# Patient Record
Sex: FEMALE | Race: WHITE | Employment: OTHER | URBAN - METROPOLITAN AREA
[De-identification: names, ages, dates, MRNs, and addresses within clinical notes are randomized per-mention and may not be internally consistent; named-entity substitution may affect disease eponyms.]

---

## 2017-01-02 ENCOUNTER — TELEPHONE (OUTPATIENT)
Dept: NEPHROLOGY | Facility: CLINIC | Age: 67
End: 2017-01-02

## 2017-01-02 DIAGNOSIS — R92.2 DENSE BREASTS: Primary | ICD-10-CM

## 2017-01-02 NOTE — TELEPHONE ENCOUNTER
The mammogram appeared to be normal but the radiologist is suggesting to do an automated whole breast sonography as she has very dense breasts. Okay to order if she wants to do.

## 2017-01-04 NOTE — TELEPHONE ENCOUNTER
Patient contacted. Results message read. Patient states she will be leaving for out of town for 3 months but would like to do the test when she gets back. Dr. Cintron Loose aware. Test has been ordered.

## 2017-01-09 ENCOUNTER — PRIOR ORIGINAL RECORDS (OUTPATIENT)
Dept: OTHER | Age: 67
End: 2017-01-09

## 2017-03-20 RX ORDER — SIMVASTATIN 40 MG
TABLET ORAL
Qty: 135 TABLET | Refills: 0 | Status: SHIPPED | OUTPATIENT
Start: 2017-03-20 | End: 2017-06-21

## 2017-04-28 RX ORDER — LISINOPRIL AND HYDROCHLOROTHIAZIDE 20; 12.5 MG/1; MG/1
TABLET ORAL
Qty: 30 TABLET | Refills: 0 | Status: SHIPPED | OUTPATIENT
Start: 2017-04-28 | End: 2017-06-19

## 2017-05-22 DIAGNOSIS — I10 ESSENTIAL HYPERTENSION, BENIGN: Primary | ICD-10-CM

## 2017-05-22 DIAGNOSIS — E78.00 HYPERCHOLESTEREMIA: ICD-10-CM

## 2017-05-22 DIAGNOSIS — E03.9 HYPOTHYROIDISM, UNSPECIFIED TYPE: ICD-10-CM

## 2017-05-22 RX ORDER — LISINOPRIL AND HYDROCHLOROTHIAZIDE 20; 12.5 MG/1; MG/1
TABLET ORAL
Qty: 30 TABLET | Refills: 0 | Status: SHIPPED | OUTPATIENT
Start: 2017-05-22 | End: 2017-06-14

## 2017-05-26 RX ORDER — FEXOFENADINE HYDROCHLORIDE AND PSEUDOEPHEDRINE HYDROCHLORIDE 180; 240 MG/1; MG/1
TABLET, FILM COATED, EXTENDED RELEASE ORAL
Qty: 30 TABLET | Refills: 5 | Status: SHIPPED | OUTPATIENT
Start: 2017-05-26 | End: 2019-02-17

## 2017-06-01 RX ORDER — LEVOTHYROXINE SODIUM 0.07 MG/1
TABLET ORAL
Qty: 90 TABLET | Refills: 0 | Status: SHIPPED | OUTPATIENT
Start: 2017-06-01 | End: 2017-09-06

## 2017-06-05 ENCOUNTER — LAB ENCOUNTER (OUTPATIENT)
Dept: LAB | Age: 67
End: 2017-06-05
Attending: INTERNAL MEDICINE
Payer: MEDICARE

## 2017-06-05 ENCOUNTER — PRIOR ORIGINAL RECORDS (OUTPATIENT)
Dept: OTHER | Age: 67
End: 2017-06-05

## 2017-06-05 DIAGNOSIS — I10 ESSENTIAL HYPERTENSION, BENIGN: ICD-10-CM

## 2017-06-05 DIAGNOSIS — E78.00 HYPERCHOLESTEREMIA: ICD-10-CM

## 2017-06-05 DIAGNOSIS — E03.9 HYPOTHYROIDISM, UNSPECIFIED TYPE: ICD-10-CM

## 2017-06-05 PROCEDURE — 84443 ASSAY THYROID STIM HORMONE: CPT

## 2017-06-05 PROCEDURE — 36415 COLL VENOUS BLD VENIPUNCTURE: CPT

## 2017-06-05 PROCEDURE — 81001 URINALYSIS AUTO W/SCOPE: CPT

## 2017-06-05 PROCEDURE — 80053 COMPREHEN METABOLIC PANEL: CPT

## 2017-06-05 PROCEDURE — 80061 LIPID PANEL: CPT

## 2017-06-05 PROCEDURE — 85025 COMPLETE CBC W/AUTO DIFF WBC: CPT

## 2017-06-14 ENCOUNTER — HOSPITAL ENCOUNTER (OUTPATIENT)
Dept: GENERAL RADIOLOGY | Facility: HOSPITAL | Age: 67
Discharge: HOME | End: 2017-06-14
Attending: INTERNAL MEDICINE
Payer: MEDICARE

## 2017-06-14 ENCOUNTER — HOSPITAL ENCOUNTER (OUTPATIENT)
Dept: GENERAL RADIOLOGY | Facility: HOSPITAL | Age: 67
Discharge: HOME | End: 2017-06-14
Attending: INTERNAL MEDICINE | Admitting: INTERNAL MEDICINE
Payer: MEDICARE

## 2017-06-14 ENCOUNTER — OFFICE VISIT (OUTPATIENT)
Dept: NEPHROLOGY | Facility: CLINIC | Age: 67
End: 2017-06-14

## 2017-06-14 VITALS
HEIGHT: 59.75 IN | DIASTOLIC BLOOD PRESSURE: 70 MMHG | BODY MASS INDEX: 29.57 KG/M2 | HEART RATE: 64 BPM | SYSTOLIC BLOOD PRESSURE: 115 MMHG | WEIGHT: 150.63 LBS

## 2017-06-14 DIAGNOSIS — E78.00 HYPERCHOLESTEROLEMIA: ICD-10-CM

## 2017-06-14 DIAGNOSIS — I10 ESSENTIAL HYPERTENSION: ICD-10-CM

## 2017-06-14 DIAGNOSIS — M25.50 ARTHRALGIA, UNSPECIFIED JOINT: ICD-10-CM

## 2017-06-14 DIAGNOSIS — M25.50 ARTHRALGIA, UNSPECIFIED JOINT: Primary | ICD-10-CM

## 2017-06-14 PROCEDURE — 72100 X-RAY EXAM L-S SPINE 2/3 VWS: CPT | Performed by: INTERNAL MEDICINE

## 2017-06-14 PROCEDURE — 99215 OFFICE O/P EST HI 40 MIN: CPT | Performed by: INTERNAL MEDICINE

## 2017-06-14 PROCEDURE — 73600 X-RAY EXAM OF ANKLE: CPT | Performed by: INTERNAL MEDICINE

## 2017-06-14 PROCEDURE — G0463 HOSPITAL OUTPT CLINIC VISIT: HCPCS | Performed by: INTERNAL MEDICINE

## 2017-06-14 PROCEDURE — 73560 X-RAY EXAM OF KNEE 1 OR 2: CPT | Performed by: INTERNAL MEDICINE

## 2017-06-14 NOTE — PROGRESS NOTES
[facility]  Nephrology Daily Progress Note    Diamante Webb  [id number]  69year old      HPI:   Diamante Webb is a 69year old female.   17-year-old female with a history of hypertension, hypercholesterolemia, hypothyroidism, status p symptoms  Respiratory:  Negative for cough, dyspnea and wheezing      PHYSICAL EXAM:   Pulse:  [64] 64  BP: (115)/(70) 115/70 mmHg  Patient Weight for the past 72 hrs:   Weight   06/14/17 1514 150 lb 9.6 oz (68.312 kg)         Constitutional: appears well intake or output data in the 24 hours ending 06/14/17 1824       ASSESSMENT/PLAN:   Assessment  Patient Active Problem List:     SX.6/26/14, [id number], RT CUBITAL TUNNEL RELEASE, DR. Terri Singleton. EXP.9/23/14     Essential hypertension     Hypercholester

## 2017-06-15 ENCOUNTER — TELEPHONE (OUTPATIENT)
Dept: NEPHROLOGY | Facility: CLINIC | Age: 67
End: 2017-06-15

## 2017-06-15 DIAGNOSIS — M19.072 ARTHRITIS OF LEFT ANKLE: ICD-10-CM

## 2017-06-15 DIAGNOSIS — M17.12 ARTHRITIS OF LEFT KNEE: ICD-10-CM

## 2017-06-15 DIAGNOSIS — M43.10 ANTEROLISTHESIS: Primary | ICD-10-CM

## 2017-06-15 NOTE — TELEPHONE ENCOUNTER
Contacted pt and notified her of MKK's message below. She states she will be going out of town until 7/15/17 so she will plan on doing PT when she returns. Order for PT placed.

## 2017-06-15 NOTE — TELEPHONE ENCOUNTER
X rays of her ankle shows mild arthritis. She has more moderate arthritis in her lumbar spine and knee. They also see the shifting of the vertebrae on her lumbar spine. Refer to physical therapy for further workup and evaluation.   Let me know if symptom

## 2017-06-19 RX ORDER — SIMVASTATIN 40 MG
TABLET ORAL
Qty: 135 TABLET | Refills: 1 | Status: SHIPPED | OUTPATIENT
Start: 2017-06-19 | End: 2017-10-27

## 2017-06-19 RX ORDER — LISINOPRIL AND HYDROCHLOROTHIAZIDE 20; 12.5 MG/1; MG/1
TABLET ORAL
Qty: 90 TABLET | Refills: 1 | OUTPATIENT
Start: 2017-06-19

## 2017-06-19 RX ORDER — LISINOPRIL AND HYDROCHLOROTHIAZIDE 20; 12.5 MG/1; MG/1
1 TABLET ORAL
Qty: 90 TABLET | Refills: 1 | Status: SHIPPED | OUTPATIENT
Start: 2017-06-19 | End: 2018-02-25

## 2017-06-21 RX ORDER — SIMVASTATIN 40 MG
TABLET ORAL
Qty: 135 TABLET | Refills: 1 | Status: SHIPPED | OUTPATIENT
Start: 2017-06-21 | End: 2017-12-16

## 2017-07-24 ENCOUNTER — TELEPHONE (OUTPATIENT)
Dept: NEPHROLOGY | Facility: CLINIC | Age: 67
End: 2017-07-24

## 2017-07-24 NOTE — TELEPHONE ENCOUNTER
Pt sent message via My Chart requesting an appt for pneumonia vaccine. OK to schedule? Pt can be reached at [phone].

## 2017-07-24 NOTE — TELEPHONE ENCOUNTER
Contacted pt. She will come in tomorrow for Prevnar. She also asked about referral for PT.  She was under the impression it would be for her back, left knee, and left ankle, but when she called, they told her only her knee and ankle were listed on referr

## 2017-07-24 NOTE — TELEPHONE ENCOUNTER
PT called back. They do see all 3 diagnoses on her referral (anterolisthesis, arthritis of left knee, and arthritis of left ankle).  She thinks whoever told her there was no diagnosis for her back just didn't realize that anterolisthesis is a condition of h

## 2017-07-24 NOTE — TELEPHONE ENCOUNTER
I don't see any pneumonia vaccines listed on her immunization list. Dr. Yesy Tate, please advise which pneumonia vaccine you'd like her to start with if it's okay that she come in for NV.

## 2017-07-25 ENCOUNTER — HOSPITAL ENCOUNTER (OUTPATIENT)
Dept: ULTRASOUND IMAGING | Facility: HOSPITAL | Age: 67
Discharge: HOME | End: 2017-07-25
Attending: INTERNAL MEDICINE
Payer: MEDICARE

## 2017-07-25 ENCOUNTER — NURSE ONLY (OUTPATIENT)
Dept: NEPHROLOGY | Facility: CLINIC | Age: 67
End: 2017-07-25

## 2017-07-25 DIAGNOSIS — R92.2 DENSE BREASTS: ICD-10-CM

## 2017-07-25 DIAGNOSIS — Z23 ENCOUNTER FOR IMMUNIZATION: Primary | ICD-10-CM

## 2017-07-25 PROCEDURE — 76641 ULTRASOUND BREAST COMPLETE: CPT | Performed by: INTERNAL MEDICINE

## 2017-07-25 PROCEDURE — 90670 PCV13 VACCINE IM: CPT | Performed by: INTERNAL MEDICINE

## 2017-07-25 PROCEDURE — G0009 ADMIN PNEUMOCOCCAL VACCINE: HCPCS | Performed by: INTERNAL MEDICINE

## 2017-07-25 RX ORDER — SUMATRIPTAN 50 MG/1
TABLET, FILM COATED ORAL
Qty: 9 TABLET | Refills: 5 | Status: SHIPPED | OUTPATIENT
Start: 2017-07-25 | End: 2019-12-20

## 2017-07-25 NOTE — PROGRESS NOTES
Patient presents to clinic today for Prevnar vaccination (see TE from 7/24/17 for Dr. Chopra Foot approval). VIS given. Prevnar administered in left deltoid IM without complication. She tolerated well. Educated patient about receiving Pneumovax 23 in 1 year.

## 2017-07-27 ENCOUNTER — OFFICE VISIT (OUTPATIENT)
Dept: PHYSICAL THERAPY | Facility: HOSPITAL | Age: 67
End: 2017-07-27
Attending: INTERNAL MEDICINE
Payer: MEDICARE

## 2017-07-27 DIAGNOSIS — M19.072 ARTHRITIS OF LEFT ANKLE: ICD-10-CM

## 2017-07-27 DIAGNOSIS — M17.12 ARTHRITIS OF LEFT KNEE: ICD-10-CM

## 2017-07-27 DIAGNOSIS — M43.10 ANTEROLISTHESIS: ICD-10-CM

## 2017-07-27 PROCEDURE — 97163 PT EVAL HIGH COMPLEX 45 MIN: CPT

## 2017-07-27 PROCEDURE — 97110 THERAPEUTIC EXERCISES: CPT

## 2017-07-27 NOTE — PROGRESS NOTES
LUMBAR SPINE EVALUATION:   Referring Physician: Dr. Saida Vazquez  Date of Onset: several months ago Date of Service: 7/27/2017   Diagnosis:   Anterolisthesis L5 on S1  (M43.10)  Arthritis of left knee (M17.12)  Arthritis of left ankle (M27.028)  PATIENT SUMM and ankle. Pt would benefit from skilled Physical Therapy to reduce pain and to restore ROM, flexibility, and strength to achieve goals as outlined and promote functional task performance.       Precautions: Pacemaker,  Grade 2 anterolisthesis L5 on S1. Charges: PT Eval, X1, TE x1       Total Timed treatment: 45 min      Total Treatment Time: 47 min        PLAN OF CARE:    Goals:    Pt will improve hip ABD and ER strength to 4-/5 to increase ease with stepping, standing and walking.    Pt will demonstrat care.    X______________________________________________ Date________________  Certification From: 2/22/0895      To: 10/25/2017

## 2017-07-28 NOTE — PROGRESS NOTES
Diagnosis: Diagnosis:   Anterolisthesis L5 on S1  (M43.10)  Arthritis of left knee (M17.12)  Arthritis of left ankle (Z80.939    Authorized # of Visits:  1/10         Next MD visit: none scheduled  Fall Risk: standard         Precautions:  Pacemaker,  Grad

## 2017-07-31 ENCOUNTER — OFFICE VISIT (OUTPATIENT)
Dept: PHYSICAL THERAPY | Facility: HOSPITAL | Age: 67
End: 2017-07-31
Attending: INTERNAL MEDICINE
Payer: MEDICARE

## 2017-07-31 DIAGNOSIS — M19.072 ARTHRITIS OF LEFT ANKLE: ICD-10-CM

## 2017-07-31 DIAGNOSIS — M43.10 ANTEROLISTHESIS: ICD-10-CM

## 2017-07-31 DIAGNOSIS — M17.12 ARTHRITIS OF LEFT KNEE: ICD-10-CM

## 2017-07-31 PROCEDURE — 97110 THERAPEUTIC EXERCISES: CPT

## 2017-07-31 NOTE — PROGRESS NOTES
Diagnosis: Diagnosis:   Anterolisthesis L5 on S1  (M43.10)  Arthritis of left knee (M17.12)  Arthritis of left ankle (X77.678  Authorized # of Visits:  2/10 (MEDICARE)        Next MD visit: none scheduled  Fall Risk: standard         Precautions:  Juani Bright

## 2017-08-02 ENCOUNTER — OFFICE VISIT (OUTPATIENT)
Dept: PHYSICAL THERAPY | Facility: HOSPITAL | Age: 67
End: 2017-08-02
Attending: INTERNAL MEDICINE
Payer: MEDICARE

## 2017-08-02 DIAGNOSIS — M17.12 ARTHRITIS OF LEFT KNEE: ICD-10-CM

## 2017-08-02 DIAGNOSIS — M19.072 ARTHRITIS OF LEFT ANKLE: ICD-10-CM

## 2017-08-02 DIAGNOSIS — M43.10 ANTEROLISTHESIS: ICD-10-CM

## 2017-08-02 PROCEDURE — 97140 MANUAL THERAPY 1/> REGIONS: CPT

## 2017-08-02 PROCEDURE — 97110 THERAPEUTIC EXERCISES: CPT

## 2017-08-02 NOTE — PROGRESS NOTES
Diagnosis: Diagnosis:   Anterolisthesis L5 on S1  (M43.10), Arthritis of left knee (M17.12), Arthritis of left ankle (E72.993  Authorized # of Visits:  3/10 (MEDICARE)        Next MD visit: none scheduled  Fall Risk: standard         Precautions:  Fahad Justice back exercises and gentle stretching exercises. Discussed patient POC with the PT.   Charges: TX x 2, MM x 1 Total Timed Treatment: 45 min  Total Treatment Time: 45 min

## 2017-08-08 ENCOUNTER — OFFICE VISIT (OUTPATIENT)
Dept: PHYSICAL THERAPY | Facility: HOSPITAL | Age: 67
End: 2017-08-08
Attending: INTERNAL MEDICINE
Payer: MEDICARE

## 2017-08-08 DIAGNOSIS — M43.10 ANTEROLISTHESIS: ICD-10-CM

## 2017-08-08 DIAGNOSIS — M17.12 ARTHRITIS OF LEFT KNEE: ICD-10-CM

## 2017-08-08 DIAGNOSIS — M19.072 ARTHRITIS OF LEFT ANKLE: ICD-10-CM

## 2017-08-08 PROCEDURE — 97140 MANUAL THERAPY 1/> REGIONS: CPT

## 2017-08-08 PROCEDURE — 97110 THERAPEUTIC EXERCISES: CPT

## 2017-08-08 NOTE — PROGRESS NOTES
Diagnosis: Diagnosis:   Anterolisthesis L5 on S1  (M43.10), Arthritis of left knee (M17.12), Arthritis of left ankle (L17.282  Authorized # of Visits:  4/10 (MEDICARE)        Next MD visit: none scheduled  Fall Risk: standard         Precautions:  Dariel Ledezma exercises and gentle stretching exercises. Discussed patient POC with the PT.   Charges: TX x 2, MM x 1 Total Timed Treatment: 45 min  Total Treatment Time: 45 min

## 2017-08-10 ENCOUNTER — OFFICE VISIT (OUTPATIENT)
Dept: PHYSICAL THERAPY | Facility: HOSPITAL | Age: 67
End: 2017-08-10
Attending: INTERNAL MEDICINE
Payer: MEDICARE

## 2017-08-10 DIAGNOSIS — M43.10 ANTEROLISTHESIS: ICD-10-CM

## 2017-08-10 DIAGNOSIS — M17.12 ARTHRITIS OF LEFT KNEE: ICD-10-CM

## 2017-08-10 DIAGNOSIS — M19.072 ARTHRITIS OF LEFT ANKLE: ICD-10-CM

## 2017-08-10 PROCEDURE — 97110 THERAPEUTIC EXERCISES: CPT

## 2017-08-10 PROCEDURE — 97140 MANUAL THERAPY 1/> REGIONS: CPT

## 2017-08-10 NOTE — PROGRESS NOTES
Diagnosis: Diagnosis:   Anterolisthesis L5 on S1  (M43.10), Arthritis of left knee (M17.12), Arthritis of left ankle (I65.552  Authorized # of Visits:  5/10 (MEDICARE)        Next MD visit: none scheduled  Fall Risk: standard         Precautions:  Verla Court back with gentle knee and piriformis stretching exercises. Patient able to do bridging without pain or discomfort. After treatment pt c/o no knee pain with walking. Plan: Continued flexion based low back exercises and gentle stretching exercises.  Micha Memory

## 2017-08-15 ENCOUNTER — APPOINTMENT (OUTPATIENT)
Dept: PHYSICAL THERAPY | Facility: HOSPITAL | Age: 67
End: 2017-08-15
Attending: INTERNAL MEDICINE
Payer: MEDICARE

## 2017-08-17 ENCOUNTER — OFFICE VISIT (OUTPATIENT)
Dept: PHYSICAL THERAPY | Facility: HOSPITAL | Age: 67
End: 2017-08-17
Attending: INTERNAL MEDICINE
Payer: MEDICARE

## 2017-08-17 DIAGNOSIS — M19.072 ARTHRITIS OF LEFT ANKLE: ICD-10-CM

## 2017-08-17 DIAGNOSIS — M43.10 ANTEROLISTHESIS: ICD-10-CM

## 2017-08-17 DIAGNOSIS — M17.12 ARTHRITIS OF LEFT KNEE: ICD-10-CM

## 2017-08-17 PROCEDURE — 97110 THERAPEUTIC EXERCISES: CPT

## 2017-08-17 PROCEDURE — 97140 MANUAL THERAPY 1/> REGIONS: CPT

## 2017-08-17 NOTE — PROGRESS NOTES
Diagnosis: Diagnosis: Anterolisthesis L5 on S1  (M43.10), Arthritis of left knee (M17.12), Arthritis of left ankle (T46.854  Authorized # of Visits:  6/10 (MEDICARE)        Next MD visit: none scheduled  Fall Risk: standard         Precautions:  Pacemaker, exercises d/t neck and shoulder pain and no new exercises this session. Patient out of town week so explain to pt continued exercises at least once day and pt verbalized understanding. Plan: Progressed standing hip strengthening exercises.  Discussed

## 2017-08-21 ENCOUNTER — APPOINTMENT (OUTPATIENT)
Dept: PHYSICAL THERAPY | Facility: HOSPITAL | Age: 67
End: 2017-08-21
Attending: INTERNAL MEDICINE
Payer: MEDICARE

## 2017-08-23 ENCOUNTER — APPOINTMENT (OUTPATIENT)
Dept: PHYSICAL THERAPY | Facility: HOSPITAL | Age: 67
End: 2017-08-23
Attending: INTERNAL MEDICINE
Payer: MEDICARE

## 2017-08-24 ENCOUNTER — OFFICE VISIT (OUTPATIENT)
Dept: PHYSICAL THERAPY | Facility: HOSPITAL | Age: 67
End: 2017-08-24
Attending: INTERNAL MEDICINE
Payer: MEDICARE

## 2017-08-24 PROCEDURE — 97110 THERAPEUTIC EXERCISES: CPT

## 2017-08-24 NOTE — PROGRESS NOTES
Diagnosis: Diagnosis: Anterolisthesis L5 on S1  (M43.10), Arthritis of left knee (M17.12), Arthritis of left ankle (A14.478  Authorized # of Visits:  7/10 (MEDICARE)        Next MD visit: none scheduled  Fall Risk: standard         Precautions:  Pacemaker, stand from std, chair with ease. Pt will improve flexibility of gastrocs to 10 deg B LE to promote normalized gait pattern.    Pt will be indep with HEP to maintain progress achieved in PT.     HEP; 8/24/17: See patient instructions    Assessment: Focus

## 2017-08-30 ENCOUNTER — OFFICE VISIT (OUTPATIENT)
Dept: PHYSICAL THERAPY | Facility: HOSPITAL | Age: 67
End: 2017-08-30
Attending: INTERNAL MEDICINE
Payer: MEDICARE

## 2017-08-30 PROCEDURE — 97140 MANUAL THERAPY 1/> REGIONS: CPT

## 2017-08-30 PROCEDURE — 97110 THERAPEUTIC EXERCISES: CPT

## 2017-08-30 NOTE — PROGRESS NOTES
Patient Name: Cesar Pan, : 1950, MRN: [id number]   Date:  2017  Referring Physician:  Linda Phillips    Diagnosis: Diagnosis: Diagnosis: Anterolisthesis L5 on S1  (M43.10), Arthritis of left knee (M17.12), Arthritis of left ankle (M1 MET  Pt will improve functional knee strength to promote  ascend/descend 1 flight of stairs with rails and minimal difficulty. MET  Pt will be able to assume sit to stand from std, chair with ease.   MET   Pt will improve flexibility of gastrocs to 10 deg B R Rotation 75% -   L Rotation 50% -          -      Balance: SLS : L 15 sec, R 12 SEC.      STRENGTH:   -/5     Left  Right Comments   Hip Flexion (L2)  4+    4+     Knee Extension (L3) 5 5     Knee Flexion 5 5     Ankle DF (L4) 5 5     EHL (L5) 5 5   has met most goals. Pt is Indep with HEP. Pt is ready for D/C form PT. Plan: D/C PT. Pt to continue with HEP and follow up with Dr as needed.     Charges: TX x2, MT X1 Total Timed Treatment: 40 min  Total Treatment Time: 45 min

## 2017-09-01 RX ORDER — LISINOPRIL AND HYDROCHLOROTHIAZIDE 20; 12.5 MG/1; MG/1
TABLET ORAL
Qty: 30 TABLET | Refills: 5 | Status: SHIPPED | OUTPATIENT
Start: 2017-09-01 | End: 2017-10-27

## 2017-09-06 RX ORDER — LEVOTHYROXINE SODIUM 0.07 MG/1
TABLET ORAL
Qty: 90 TABLET | Refills: 0 | Status: SHIPPED | OUTPATIENT
Start: 2017-09-06 | End: 2017-11-25

## 2017-10-26 ENCOUNTER — TELEPHONE (OUTPATIENT)
Dept: NEPHROLOGY | Facility: CLINIC | Age: 67
End: 2017-10-26

## 2017-10-26 NOTE — TELEPHONE ENCOUNTER
Pt. States that she has a bad cough, congestion and sore throat. Pt. States that when she blows her nose she has bloody discharge, pt. Thinks that it may be due to her taking Pradaxa. She states that it is a blood thinner.  Pt.wants to know if she can be se

## 2017-10-26 NOTE — TELEPHONE ENCOUNTER
LMTCB. Encounter routed to Dr. Cherry Sim but if patient calls back and wants to be seen ok to use Friday 10/27/17 2:40 or 3:40 if still available when patient calls back.

## 2017-10-26 NOTE — TELEPHONE ENCOUNTER
Pt retd call and scheduled appt for 10/27/17 at 2:40pm.  Pt would also like to have RX if possible. Please call.

## 2017-10-27 ENCOUNTER — OFFICE VISIT (OUTPATIENT)
Dept: FAMILY MEDICINE CLINIC | Facility: CLINIC | Age: 67
End: 2017-10-27

## 2017-10-27 ENCOUNTER — TELEPHONE (OUTPATIENT)
Dept: NEPHROLOGY | Facility: CLINIC | Age: 67
End: 2017-10-27

## 2017-10-27 VITALS
TEMPERATURE: 98 F | BODY MASS INDEX: 28 KG/M2 | SYSTOLIC BLOOD PRESSURE: 124 MMHG | DIASTOLIC BLOOD PRESSURE: 74 MMHG | HEART RATE: 66 BPM | HEIGHT: 60 IN | RESPIRATION RATE: 14 BRPM | OXYGEN SATURATION: 98 %

## 2017-10-27 DIAGNOSIS — J00 ACUTE NASOPHARYNGITIS: Primary | ICD-10-CM

## 2017-10-27 DIAGNOSIS — R01.1 CARDIAC MURMUR: ICD-10-CM

## 2017-10-27 PROCEDURE — 99202 OFFICE O/P NEW SF 15 MIN: CPT | Performed by: NURSE PRACTITIONER

## 2017-10-27 PROCEDURE — 87880 STREP A ASSAY W/OPTIC: CPT | Performed by: NURSE PRACTITIONER

## 2017-10-27 PROCEDURE — 87081 CULTURE SCREEN ONLY: CPT | Performed by: NURSE PRACTITIONER

## 2017-10-27 NOTE — TELEPHONE ENCOUNTER
Dr. Samantha Underwood scheduled an office visit with you for today 10/27/17 for symptoms listed in Encounter below but she is asking for a prescription.  Sent encounter to Dr. Steffi Gorman yesterday to see if he would rx something but he said to wait until you were

## 2017-10-27 NOTE — PROGRESS NOTES
CHIEF COMPLAINT:   Patient presents with:  URI      HPI:   Philip Murphy is a 69year old female who presents with URI symptoms for  2-3 days. Onset was gradual, Location is mid face and throat. Symptoms are progressing.  Described as nasal pressure, sor Comment: Jaw surgery  No date: PACEMAKER      Smoking status: Never Smoker                                                                    REVIEW OF SYSTEMS:   GENERAL: patient feels sick, but does not report fever, chills, sweats or fatigue.   SKIN: THROAT: oral mucosa pink, moist. Normal tongue. Posterior pharynx is not erythematous, there is no exudate, tonsils are absent, airway is open, mucus is visible draining down posterior pharynx. LYMPH: No cervical lymphadenopathy is noted in head or neck. Instructed patient to follow up with PCP at next opportunity to further evaluate Grade 1 systolic murmur heard at 6-7OX right ICS. Patient instructed to stop any activity that causes CP/SOB and go to ER or call 911.  Patient instructed to go to ER or call 9 · Put fluids back into your body. Take frequent sips of clear liquids such as water or broth. Avoid drinks that have a lot of sugar in them, such as juices and sodas. These can make diarrhea worse. Older children and adults can drink sports drinks.   · As y · Overactive thyroid gland  An abnormal heart murmur can be caused by heart problems such as:  · A damaged or diseased valve. The valve may be too narrow for blood to flow through easily.  Or it may have problems opening or closing, and may leak blood backw · Blood clots and stroke  · Fainting  · Heart attack  · Sudden cardiac arrest. This problem occurs when the heart suddenly stops beating. When should I call my healthcare provider?   Call your healthcare provider right away if you have any of these:  · Leslie

## 2017-10-27 NOTE — PATIENT INSTRUCTIONS
Adult Self-Care for Colds  Colds are caused by viruses. They can't be cured with antibiotics. However, you can ease symptoms and support your body's efforts to heal itself.   No matter which symptoms you have, be sure to:  · Drink plenty of fluids (water When you first notice symptoms, ask your healthcare provider if antiviral medicines are appropriate. Antibiotics should not be taken for colds or flu.  Also, call your healthcare provider if you have any of the following symptoms or if you aren't feeling be · A hole in the heart (septal defect). This is a problem with the heart’s structure that a person is born with (congenital). It causes blood to leak through the wall that normally divides the left and right sides of the heart.   What are the symptoms of a h · Shortness of breath  · Fever of 100.4°F (38°C) or higher, or as directed  · Symptoms that don’t get better with treatment, or symptoms that get worse  · New symptoms   Date Last Reviewed: 5/1/2016  © [id number] [employer].  [address]

## 2017-10-27 NOTE — TELEPHONE ENCOUNTER
Patient had scheduled a 2:40 appointment time with [facility], but arrived earlier than scheduled time. Patient roomed at approximately 2;35. Upon completion of the rooming process;  Patient Stated that she must be finished with appointment by 3:00 because she ha

## 2017-10-27 NOTE — TELEPHONE ENCOUNTER
LM on vm per Dr. Ml Al --keep appointment today and Dr. Ml Al will decide if he needs to prescribe any medication.

## 2017-11-16 LAB
ALBUMIN: 4.2 G/DL
ALT (SGPT): 15 U/L
AST (SGOT): 18 U/L
BILIRUBIN TOTAL: 0.6 MG/DL
BILIRUBIN TOTAL: 0.6 MG/DL
BUN: 14 MG/DL
CALCIUM: 9.1 MG/DL
CHLORIDE: 108 MEQ/L
CHOLESTEROL, TOTAL: 160 MG/DL
CREATININE, SERUM: 0.79 MG/DL
GLOBULIN: 2.6 G/DL
GLUCOSE: 90 MG/DL
GLUCOSE: 90 MG/DL
HDL CHOLESTEROL: 54 MG/DL
HEMATOCRIT: 39.9 %
HEMOGLOBIN: 13.4 G/DL
LDL CHOLESTEROL: 94 MG/DL
NON-HDL CHOLESTEROL: 106 MG/DL
PLATELETS: 196 K/UL
PROTEIN, TOTAL: 6.8 G/DL
RED BLOOD COUNT: 4.88 X 10-6/U
SGOT (AST): 18 IU/L
SGPT (ALT): 15 IU/L
SODIUM: 142 MEQ/L
TRIGLYCERIDES: 60 MG/DL
WHITE BLOOD COUNT: 4.4 X 10-3/U

## 2017-11-17 ENCOUNTER — PRIOR ORIGINAL RECORDS (OUTPATIENT)
Dept: OTHER | Age: 67
End: 2017-11-17

## 2017-11-27 RX ORDER — LEVOTHYROXINE SODIUM 0.07 MG/1
TABLET ORAL
Qty: 90 TABLET | Refills: 1 | Status: SHIPPED | OUTPATIENT
Start: 2017-11-27 | End: 2018-06-05

## 2017-11-27 NOTE — TELEPHONE ENCOUNTER
Rx request for Levothyroxine Sodium 75 mcg, please review. Thank you.     LOV: 10/27/17  Last Refill: 9/6/17

## 2017-12-16 DIAGNOSIS — E78.00 PURE HYPERCHOLESTEROLEMIA: Primary | ICD-10-CM

## 2017-12-18 RX ORDER — SIMVASTATIN 40 MG
TABLET ORAL
Qty: 135 TABLET | Refills: 1 | Status: SHIPPED | OUTPATIENT
Start: 2017-12-18 | End: 2018-04-18

## 2017-12-19 NOTE — TELEPHONE ENCOUNTER
LM on vm that Dr. Ramonia Kayser would like patient to do lab work. Orders entered, patient needs to fast 12 hours. Requested a call back to confirm that patient got this message.

## 2017-12-20 ENCOUNTER — APPOINTMENT (OUTPATIENT)
Dept: LAB | Age: 67
End: 2017-12-20
Attending: INTERNAL MEDICINE
Payer: MEDICARE

## 2017-12-20 DIAGNOSIS — E78.00 PURE HYPERCHOLESTEROLEMIA: ICD-10-CM

## 2017-12-20 PROCEDURE — 80076 HEPATIC FUNCTION PANEL: CPT

## 2017-12-20 PROCEDURE — 80061 LIPID PANEL: CPT

## 2017-12-20 PROCEDURE — 36415 COLL VENOUS BLD VENIPUNCTURE: CPT

## 2018-01-09 ENCOUNTER — PRIOR ORIGINAL RECORDS (OUTPATIENT)
Dept: OTHER | Age: 68
End: 2018-01-09

## 2018-02-26 RX ORDER — LISINOPRIL AND HYDROCHLOROTHIAZIDE 20; 12.5 MG/1; MG/1
TABLET ORAL
Qty: 90 TABLET | Refills: 0 | Status: SHIPPED | OUTPATIENT
Start: 2018-02-26 | End: 2018-10-15

## 2018-04-12 ENCOUNTER — MYAURORA ACCOUNT LINK (OUTPATIENT)
Dept: OTHER | Age: 68
End: 2018-04-12

## 2018-04-18 ENCOUNTER — TELEPHONE (OUTPATIENT)
Dept: NEPHROLOGY | Facility: CLINIC | Age: 68
End: 2018-04-18

## 2018-04-18 DIAGNOSIS — E03.9 ACQUIRED HYPOTHYROIDISM: ICD-10-CM

## 2018-04-18 DIAGNOSIS — E78.00 PURE HYPERCHOLESTEROLEMIA: Primary | ICD-10-CM

## 2018-04-18 RX ORDER — ATORVASTATIN CALCIUM 40 MG/1
TABLET, FILM COATED ORAL
Qty: 90 TABLET | Refills: 1 | Status: SHIPPED | OUTPATIENT
Start: 2018-04-18 | End: 2019-12-20

## 2018-04-18 RX ORDER — ATORVASTATIN CALCIUM 40 MG/1
40 TABLET, FILM COATED ORAL NIGHTLY
Qty: 30 TABLET | Refills: 5 | Status: SHIPPED | OUTPATIENT
Start: 2018-04-18 | End: 2018-04-18

## 2018-04-18 NOTE — TELEPHONE ENCOUNTER
Switch to Lipitor 40 mg 1 daily.   In 1 month after the switch to a CBC, CMP, lipid panel, urinalysis, TSH and see

## 2018-04-18 NOTE — TELEPHONE ENCOUNTER
Patient contacted. Advised that Dr. Shy Watson is changing Simvastatin to Lipitor due to insurance. She is aware to do lab work 1 month after the switch. Orders entered in system.  Appointment for follow up booked for 5/21/18 at 1:40 pm. If med is too expensiv

## 2018-04-18 NOTE — TELEPHONE ENCOUNTER
Pharmacy contacted. Insurance will only cover 30 tabs for 30 days. Patient is taking 1 1/2 tablets of Simvastatin 40 mg. Do you want to change this prescription?

## 2018-05-08 ENCOUNTER — OFFICE VISIT (OUTPATIENT)
Dept: ORTHOPEDICS CLINIC | Facility: CLINIC | Age: 68
End: 2018-05-08

## 2018-05-08 DIAGNOSIS — M77.01 MEDIAL EPICONDYLITIS OF ELBOW, RIGHT: Primary | ICD-10-CM

## 2018-05-08 PROCEDURE — A4467 BELT STRAP SLEEV GRMNT COVER: HCPCS | Performed by: ORTHOPAEDIC SURGERY

## 2018-05-08 PROCEDURE — 99213 OFFICE O/P EST LOW 20 MIN: CPT | Performed by: ORTHOPAEDIC SURGERY

## 2018-05-08 PROCEDURE — G0463 HOSPITAL OUTPT CLINIC VISIT: HCPCS | Performed by: ORTHOPAEDIC SURGERY

## 2018-05-09 NOTE — PROGRESS NOTES
5/8/2018  Taylormayo Suarez  6/11/1950  69year old   female  Winifred Garnica MD    HPI:   Patient presents with:  Wrist Pain: Right wrist/hand pain that radiates to right elbow- pt states this has been going on for a couple yrs.  pt states she gets crampin exam 1997   • H/O diagnostic mammography [id number]   • H/O exercise stress test 1995   • HTN (hypertension)    • Hyperlipidemia    • Hyperlipidemia    • Hypothyroidism    • Pacemaker       Past Surgical History:  No date: CARPAL TUNNEL RELEASE  2010: COLON patient has no tenderness to palpation over the radial tunnel and no pain with resisted supination. The patient has pain with cubital tunnel compression test, elbow flexion test, and tinel over the ulnar nerve at the elbow.       ASSESSMENT AND PLAN:   Med

## 2018-05-11 ENCOUNTER — OFFICE VISIT (OUTPATIENT)
Dept: OCCUPATIONAL MEDICINE | Facility: HOSPITAL | Age: 68
End: 2018-05-11
Attending: INTERNAL MEDICINE
Payer: MEDICARE

## 2018-05-11 DIAGNOSIS — M77.01 MEDIAL EPICONDYLITIS OF ELBOW, RIGHT: ICD-10-CM

## 2018-05-11 PROCEDURE — 97530 THERAPEUTIC ACTIVITIES: CPT | Performed by: OCCUPATIONAL THERAPIST

## 2018-05-11 PROCEDURE — 97140 MANUAL THERAPY 1/> REGIONS: CPT | Performed by: OCCUPATIONAL THERAPIST

## 2018-05-11 PROCEDURE — 97166 OT EVAL MOD COMPLEX 45 MIN: CPT | Performed by: OCCUPATIONAL THERAPIST

## 2018-05-11 NOTE — PROGRESS NOTES
OCCUPATIONAL THERAPY UPPER EXTREMITY EVALUATION:   Referring Physician: Dr. Sharyn Aponte  Date of onset: 2017  Diagnosis: Right Medial Epicondylitis Date of Service: 5/11/2018       PATIENT SUMMARY:   Parisa Deleon is a 69year old y/o female who presents following provocative test: Resisted forearm Pronation also point tenderness at medial epicondyle. Patient lives in a house with her  who assists with home making tasks and cooking. She spends the winter in [place] and enjoys golfing.   Given the ab : 10 7 10 9    23 24 23 23      2 pt Pinch:                 3 pt Pinch: 11       14         Lateral Pinch: 8       13             Today's Treatment: STM of forearm flexors, MFR in supine, Manual ulnar nerve glides, educated on diagnosis, POC and HEP at Dept: [phone]    Sincerely,  Electronically signed by therapist: Julia Sender, OT    [de-identified] certification required: Yes  I certify the need for these services furnished under this plan of treatment and while under my care.         X__________

## 2018-05-15 ENCOUNTER — OFFICE VISIT (OUTPATIENT)
Dept: OCCUPATIONAL MEDICINE | Facility: HOSPITAL | Age: 68
End: 2018-05-15
Attending: INTERNAL MEDICINE
Payer: MEDICARE

## 2018-05-15 PROCEDURE — 97760 ORTHOTIC MGMT&TRAING 1ST ENC: CPT | Performed by: OCCUPATIONAL THERAPIST

## 2018-05-15 PROCEDURE — 97140 MANUAL THERAPY 1/> REGIONS: CPT | Performed by: OCCUPATIONAL THERAPIST

## 2018-05-15 NOTE — PROGRESS NOTES
Diagnosis:  Right Medial Epicondylitis  Authorized # of Visits:  12       Next MD visit: none scheduled  Fall Risk: standard         Precautions: n/a           Medication Changes since last visit?: No  Subjective: \"I cooked all day Sunday and towards th POC      Charges: MT2, ORTHo   Total Timed Treatment: 45 min  Total Treatment Time: 50 min

## 2018-05-17 ENCOUNTER — OFFICE VISIT (OUTPATIENT)
Dept: OCCUPATIONAL MEDICINE | Facility: HOSPITAL | Age: 68
End: 2018-05-17
Attending: ORTHOPAEDIC SURGERY
Payer: MEDICARE

## 2018-05-17 ENCOUNTER — PRIOR ORIGINAL RECORDS (OUTPATIENT)
Dept: OTHER | Age: 68
End: 2018-05-17

## 2018-05-17 ENCOUNTER — LAB ENCOUNTER (OUTPATIENT)
Dept: LAB | Age: 68
End: 2018-05-17
Attending: INTERNAL MEDICINE
Payer: MEDICARE

## 2018-05-17 DIAGNOSIS — E78.00 PURE HYPERCHOLESTEROLEMIA: ICD-10-CM

## 2018-05-17 DIAGNOSIS — E03.9 ACQUIRED HYPOTHYROIDISM: ICD-10-CM

## 2018-05-17 PROCEDURE — 80053 COMPREHEN METABOLIC PANEL: CPT

## 2018-05-17 PROCEDURE — 81001 URINALYSIS AUTO W/SCOPE: CPT

## 2018-05-17 PROCEDURE — 97140 MANUAL THERAPY 1/> REGIONS: CPT | Performed by: OCCUPATIONAL THERAPIST

## 2018-05-17 PROCEDURE — 36415 COLL VENOUS BLD VENIPUNCTURE: CPT

## 2018-05-17 PROCEDURE — 97110 THERAPEUTIC EXERCISES: CPT | Performed by: OCCUPATIONAL THERAPIST

## 2018-05-17 PROCEDURE — 84443 ASSAY THYROID STIM HORMONE: CPT

## 2018-05-17 PROCEDURE — 85025 COMPLETE CBC W/AUTO DIFF WBC: CPT

## 2018-05-17 PROCEDURE — 80061 LIPID PANEL: CPT

## 2018-05-17 NOTE — PROGRESS NOTES
Diagnosis:  Right Medial Epicondylitis  Authorized # of Visits:  12       Next MD visit: none scheduled  Fall Risk: standard         Precautions:     Pacemaker  Medication Changes since last visit?: No  Subjective: \"I had some electric shocks going thro groceries. Patient will demonstrate independence with don/doff elbow flexion blocking splint.       Plan:  Continue with POC      Charges: MT2TE Total Timed Treatment: 45 min  Total Treatment Time: 50 min

## 2018-05-21 ENCOUNTER — OFFICE VISIT (OUTPATIENT)
Dept: NEPHROLOGY | Facility: CLINIC | Age: 68
End: 2018-05-21

## 2018-05-21 ENCOUNTER — TELEPHONE (OUTPATIENT)
Dept: NEPHROLOGY | Facility: CLINIC | Age: 68
End: 2018-05-21

## 2018-05-21 VITALS
SYSTOLIC BLOOD PRESSURE: 133 MMHG | BODY MASS INDEX: 31.04 KG/M2 | WEIGHT: 154 LBS | HEART RATE: 75 BPM | HEIGHT: 59 IN | DIASTOLIC BLOOD PRESSURE: 82 MMHG

## 2018-05-21 DIAGNOSIS — M81.8 OTHER OSTEOPOROSIS, UNSPECIFIED PATHOLOGICAL FRACTURE PRESENCE: ICD-10-CM

## 2018-05-21 DIAGNOSIS — I10 ESSENTIAL HYPERTENSION: Primary | ICD-10-CM

## 2018-05-21 DIAGNOSIS — Z12.31 ENCOUNTER FOR SCREENING MAMMOGRAM FOR BREAST CANCER: ICD-10-CM

## 2018-05-21 DIAGNOSIS — E03.9 ACQUIRED HYPOTHYROIDISM: ICD-10-CM

## 2018-05-21 DIAGNOSIS — E78.00 HYPERCHOLESTEROLEMIA: ICD-10-CM

## 2018-05-21 PROCEDURE — 99214 OFFICE O/P EST MOD 30 MIN: CPT | Performed by: INTERNAL MEDICINE

## 2018-05-21 PROCEDURE — G0463 HOSPITAL OUTPT CLINIC VISIT: HCPCS | Performed by: INTERNAL MEDICINE

## 2018-05-21 RX ORDER — MV-MIN/FOLIC/VIT K/LYCOP/COQ10 200-100MCG
1 CAPSULE ORAL DAILY
COMMUNITY
End: 2019-12-20

## 2018-05-22 NOTE — TELEPHONE ENCOUNTER
Please let the patient know she is due for follow-up mammogram and bone density scan. Please have her do.   I ordered

## 2018-05-22 NOTE — PROGRESS NOTES
[facility], [facility]  Nephrology Daily Progress Note    Mark Mckeon  [id number]  69year old  Patient presents with:  Test Results      HPI:   Mark Mckeon is a 69year old female.   77-year-old female with a history of hypertension, hypercholesterolemi - 14 -   Temp 98.4 98.2 -   SpO2 - 98 -   Weight 145 lbs 13 oz - 154 lbs   Height 59.000 60.000 59.000   BODY MASS INDEX - - 31.1       VITALS: WEIGHT ONLY 6/14/2017 10/27/2017 5/21/2018   Weight 150 lbs 10 oz 145 lbs 13 oz 154 lbs       Constitutional: ap 90 tablet, Rfl: 1  •  SUMATRIPTAN SUCCINATE 50 MG Oral Tab, SEE NOTES, Disp: 9 tablet, Rfl: 5  •  ALLEGRA-D ALLERGY & CONGESTION 180-240 MG Oral Tablet 24 Hr, TAKE 1 TABLET BY MOUTH EVERY DAY, Disp: 30 tablet, Rfl: 5  •  Dabigatran Etexilate Mesylate (PRAD

## 2018-05-24 ENCOUNTER — OFFICE VISIT (OUTPATIENT)
Dept: OCCUPATIONAL MEDICINE | Facility: HOSPITAL | Age: 68
End: 2018-05-24
Attending: INTERNAL MEDICINE
Payer: MEDICARE

## 2018-05-24 PROCEDURE — 97110 THERAPEUTIC EXERCISES: CPT | Performed by: OCCUPATIONAL THERAPIST

## 2018-05-24 PROCEDURE — 97140 MANUAL THERAPY 1/> REGIONS: CPT | Performed by: OCCUPATIONAL THERAPIST

## 2018-05-24 NOTE — PROGRESS NOTES
Diagnosis:  Right Medial Epicondylitis  Authorized # of Visits:  12       Next MD visit: none scheduled  Fall Risk: standard         Precautions:     Pacemaker  Medication Changes since last visit?: No  Subjective: \"My arm is feeling numb today, I woke progress achieved in OT. Patient will demonstrate right wrist flexion of at least 60 degrees and extension of 60 degrees for ease in swinging golf club.   Patient will demonstrate increase in right  strength to at least 20 lbs for ease in lifting bag o

## 2018-06-05 RX ORDER — LEVOTHYROXINE SODIUM 0.07 MG/1
TABLET ORAL
Qty: 90 TABLET | Refills: 1 | Status: SHIPPED | OUTPATIENT
Start: 2018-06-05 | End: 2018-11-23

## 2018-06-07 ENCOUNTER — OFFICE VISIT (OUTPATIENT)
Dept: OCCUPATIONAL MEDICINE | Facility: HOSPITAL | Age: 68
End: 2018-06-07
Attending: INTERNAL MEDICINE
Payer: MEDICARE

## 2018-06-07 PROCEDURE — 97140 MANUAL THERAPY 1/> REGIONS: CPT | Performed by: OCCUPATIONAL THERAPIST

## 2018-06-07 PROCEDURE — 97110 THERAPEUTIC EXERCISES: CPT | Performed by: OCCUPATIONAL THERAPIST

## 2018-06-07 NOTE — PROGRESS NOTES
Diagnosis:  Right Medial Epicondylitis  Authorized # of Visits:  12       Next MD visit: none scheduled  Fall Risk: standard         Precautions:     Pacemaker  Medication Changes since last visit?: No  Subjective: \"My arm is feeling better but I still to therapy after cleaning her pond at home. She reported more sensitivity and cramping in digits today.        Goals:     Pt will present with improved Functional Severity modifier to: ENZO  Pt complaints of pain in right forearm  will decrease at worst to 1/

## 2018-06-12 ENCOUNTER — OFFICE VISIT (OUTPATIENT)
Dept: OCCUPATIONAL MEDICINE | Facility: HOSPITAL | Age: 68
End: 2018-06-12
Attending: INTERNAL MEDICINE
Payer: MEDICARE

## 2018-06-12 PROCEDURE — 97140 MANUAL THERAPY 1/> REGIONS: CPT | Performed by: OCCUPATIONAL THERAPIST

## 2018-06-12 PROCEDURE — 97110 THERAPEUTIC EXERCISES: CPT | Performed by: OCCUPATIONAL THERAPIST

## 2018-06-12 NOTE — PROGRESS NOTES
Diagnosis:  Right Medial Epicondylitis  Authorized # of Visits:  12       Next MD visit: none scheduled  Fall Risk: standard         Precautions:     Pacemaker  Medication Changes since last visit?: No  Subjective: \"I think my arm has gotten better, at flexion blocking splint fabricated for overnight  Issued padded elbow sleeve Splint readjusted for comfort.  PRE tricep in supine with 2# x 20, Bicep and BR 2# x 20      Yellow swiss ball , overhead stretching Yellow swiss ball , overhead stretching x 10 [place]

## 2018-06-14 ENCOUNTER — APPOINTMENT (OUTPATIENT)
Dept: OCCUPATIONAL MEDICINE | Facility: HOSPITAL | Age: 68
End: 2018-06-14
Attending: INTERNAL MEDICINE
Payer: MEDICARE

## 2018-06-18 ENCOUNTER — OFFICE VISIT (OUTPATIENT)
Dept: OCCUPATIONAL MEDICINE | Facility: HOSPITAL | Age: 68
End: 2018-06-18
Attending: INTERNAL MEDICINE
Payer: MEDICARE

## 2018-06-18 PROCEDURE — 97110 THERAPEUTIC EXERCISES: CPT

## 2018-06-18 PROCEDURE — 97140 MANUAL THERAPY 1/> REGIONS: CPT

## 2018-06-18 RX ORDER — SUMATRIPTAN 50 MG/1
TABLET, FILM COATED ORAL
Qty: 9 TABLET | Refills: 5 | Status: SHIPPED | OUTPATIENT
Start: 2018-06-18 | End: 2019-08-26

## 2018-06-18 NOTE — PROGRESS NOTES
Diagnosis:  Right Medial Epicondylitis  Authorized # of Visits:  12       Next MD visit: none scheduled  Fall Risk: standard         Precautions:     Pacemaker  Medication Changes since last visit?: No  Subjective: Patient reports increased pain from a n Wrist PREs, flex/ext, UD/RD - 2# x20 reps    PNF diagonal with 1#, D1, D2 x 10 each PNF diagonal with 1#, D1, D2 x 10 each PNF diagonal with 1#, D1, D2 x 10 each PNF diagonal with 1#, D1, D2 x 10 each PNF diagonal with 1#, D1, D2 x 10 each PNF diagonal - 1

## 2018-06-19 ENCOUNTER — APPOINTMENT (OUTPATIENT)
Dept: OCCUPATIONAL MEDICINE | Facility: HOSPITAL | Age: 68
End: 2018-06-19
Attending: INTERNAL MEDICINE
Payer: MEDICARE

## 2018-06-21 ENCOUNTER — APPOINTMENT (OUTPATIENT)
Dept: OCCUPATIONAL MEDICINE | Facility: HOSPITAL | Age: 68
End: 2018-06-21
Attending: INTERNAL MEDICINE
Payer: MEDICARE

## 2018-06-26 ENCOUNTER — APPOINTMENT (OUTPATIENT)
Dept: OCCUPATIONAL MEDICINE | Facility: HOSPITAL | Age: 68
End: 2018-06-26
Attending: INTERNAL MEDICINE
Payer: MEDICARE

## 2018-06-28 ENCOUNTER — APPOINTMENT (OUTPATIENT)
Dept: OCCUPATIONAL MEDICINE | Facility: HOSPITAL | Age: 68
End: 2018-06-28
Attending: INTERNAL MEDICINE
Payer: MEDICARE

## 2018-07-24 ENCOUNTER — MYAURORA ACCOUNT LINK (OUTPATIENT)
Dept: OTHER | Age: 68
End: 2018-07-24

## 2018-10-02 ENCOUNTER — PRIOR ORIGINAL RECORDS (OUTPATIENT)
Dept: OTHER | Age: 68
End: 2018-10-02

## 2018-10-15 ENCOUNTER — MYAURORA ACCOUNT LINK (OUTPATIENT)
Dept: OTHER | Age: 68
End: 2018-10-15

## 2018-10-15 ENCOUNTER — PRIOR ORIGINAL RECORDS (OUTPATIENT)
Dept: OTHER | Age: 68
End: 2018-10-15

## 2018-10-15 RX ORDER — LISINOPRIL AND HYDROCHLOROTHIAZIDE 20; 12.5 MG/1; MG/1
TABLET ORAL
Qty: 90 TABLET | Refills: 0 | Status: SHIPPED | OUTPATIENT
Start: 2018-10-15 | End: 2018-12-29

## 2018-10-17 ENCOUNTER — PRIOR ORIGINAL RECORDS (OUTPATIENT)
Dept: OTHER | Age: 68
End: 2018-10-17

## 2018-10-19 DIAGNOSIS — E78.00 PURE HYPERCHOLESTEROLEMIA: Primary | ICD-10-CM

## 2018-10-19 RX ORDER — ATORVASTATIN CALCIUM 40 MG/1
TABLET, FILM COATED ORAL
Qty: 30 TABLET | Refills: 5 | Status: SHIPPED | OUTPATIENT
Start: 2018-10-19 | End: 2019-04-30

## 2018-10-19 NOTE — TELEPHONE ENCOUNTER
Pt contacted. Advised of refill for Atorvastatin. Lab orders entered in system. Instructed to fast 12 hours for lab work. She is aware to do these labs in November.  Follow up appt booked for Friday 11/9/18 at 1:20 pm.

## 2018-11-05 LAB
BILIRUBIN TOTAL: 1 MG/DL
BUN: 7 MG/DL
CALCIUM: 9.3 MG/DL
CHLORIDE: 105 MEQ/L
CHOLESTEROL, TOTAL: 165 MG/DL
CREATININE, SERUM: 0.73 MG/DL
GLUCOSE: 92 MG/DL
HDL CHOLESTEROL: 53 MG/DL
HEMATOCRIT: 40.5 %
HEMOGLOBIN: 13.9 G/DL
LDL CHOLESTEROL: 97 MG/DL
NON-HDL CHOLESTEROL: 112 MG/DL
PLATELETS: 198 K/UL
POTASSIUM, SERUM: 3.9 MEQ/L
PROTEIN, TOTAL: 7.3 G/DL
RED BLOOD COUNT: 5.05 X 10-6/U
SGOT (AST): 19 IU/L
SGPT (ALT): 19 IU/L
SODIUM: 141 MEQ/L
THYROID STIMULATING HORMONE: 2.82 MLU/L
TRIGLYCERIDES: 77 MG/DL
WHITE BLOOD COUNT: 4.5 X 10-3/U

## 2018-11-06 ENCOUNTER — PRIOR ORIGINAL RECORDS (OUTPATIENT)
Dept: OTHER | Age: 68
End: 2018-11-06

## 2018-11-07 ENCOUNTER — APPOINTMENT (OUTPATIENT)
Dept: LAB | Age: 68
End: 2018-11-07
Attending: INTERNAL MEDICINE
Payer: MEDICARE

## 2018-11-07 DIAGNOSIS — E78.00 PURE HYPERCHOLESTEROLEMIA: ICD-10-CM

## 2018-11-07 PROCEDURE — 80061 LIPID PANEL: CPT

## 2018-11-07 PROCEDURE — 36415 COLL VENOUS BLD VENIPUNCTURE: CPT

## 2018-11-07 PROCEDURE — 80076 HEPATIC FUNCTION PANEL: CPT

## 2018-11-09 ENCOUNTER — OFFICE VISIT (OUTPATIENT)
Dept: NEPHROLOGY | Facility: CLINIC | Age: 68
End: 2018-11-09
Payer: MEDICARE

## 2018-11-09 VITALS
SYSTOLIC BLOOD PRESSURE: 101 MMHG | WEIGHT: 150 LBS | BODY MASS INDEX: 30.24 KG/M2 | DIASTOLIC BLOOD PRESSURE: 65 MMHG | HEART RATE: 81 BPM | HEIGHT: 59 IN

## 2018-11-09 DIAGNOSIS — R20.2 NUMBNESS AND TINGLING OF BOTH LOWER EXTREMITIES: ICD-10-CM

## 2018-11-09 DIAGNOSIS — I10 ESSENTIAL HYPERTENSION: Primary | ICD-10-CM

## 2018-11-09 DIAGNOSIS — E78.00 PURE HYPERCHOLESTEROLEMIA: ICD-10-CM

## 2018-11-09 DIAGNOSIS — R20.0 NUMBNESS AND TINGLING OF BOTH LOWER EXTREMITIES: ICD-10-CM

## 2018-11-09 DIAGNOSIS — E03.9 ACQUIRED HYPOTHYROIDISM: ICD-10-CM

## 2018-11-09 PROCEDURE — G0463 HOSPITAL OUTPT CLINIC VISIT: HCPCS | Performed by: INTERNAL MEDICINE

## 2018-11-09 PROCEDURE — 99214 OFFICE O/P EST MOD 30 MIN: CPT | Performed by: INTERNAL MEDICINE

## 2018-11-09 RX ORDER — PNEUMOCOCCAL 13-VALENT CONJUGATE VACCINE 2.2; 2.2; 2.2; 2.2; 2.2; 4.4; 2.2; 2.2; 2.2; 2.2; 2.2; 2.2; 2.2 UG/.5ML; UG/.5ML; UG/.5ML; UG/.5ML; UG/.5ML; UG/.5ML; UG/.5ML; UG/.5ML; UG/.5ML; UG/.5ML; UG/.5ML; UG/.5ML; UG/.5ML
INJECTION, SUSPENSION INTRAMUSCULAR
Refills: 0 | COMMUNITY
Start: 2018-08-18 | End: 2019-12-20

## 2018-11-10 NOTE — PROGRESS NOTES
[facility]  Nephrology Daily Progress Note    Sander Luz  [id number]  77year old  Patient presents with: Annual      HPI:   Sander Luz is a 77year old female.   22-year-old female with a history of hypertension, hypercholesterolemia, hyp supple without adenopathy  Lymphatic: no abnormal cervical, supraclavicular or axillary adenopathy is noted  Respiratory: normal to inspection lungs are clear to auscultation bilaterally normal respiratory effort  Cardiovascular: regular rate and rhythm no Disp: , Rfl:   •  ATORVASTATIN 40 MG Oral Tab, TAKE 1 TABLET BY MOUTH EVERY NIGHT, Disp: 90 tablet, Rfl: 1  •  SUMATRIPTAN SUCCINATE 50 MG Oral Tab, SEE NOTES, Disp: 9 tablet, Rfl: 5  •  ALLEGRA-D ALLERGY & CONGESTION 180-240 MG Oral Tablet 24 Hr, TAKE 1 T neurological consultation. Mammogram and DEXA scan ordered for next month. Colonoscopy is up-to-date.          Orders Placed This Encounter      TSH W Reflex To Free T4 [E]      Vitamin B12 [E]      BON, Direct, Reflex Titer + Spec AB      Sed Rate, [employer]

## 2018-11-23 RX ORDER — LEVOTHYROXINE SODIUM 0.07 MG/1
TABLET ORAL
Qty: 90 TABLET | Refills: 1 | Status: SHIPPED | OUTPATIENT
Start: 2018-11-23 | End: 2019-06-07

## 2018-11-26 ENCOUNTER — TELEPHONE (OUTPATIENT)
Dept: NEPHROLOGY | Facility: CLINIC | Age: 68
End: 2018-11-26

## 2018-11-26 ENCOUNTER — HOSPITAL ENCOUNTER (OUTPATIENT)
Dept: BONE DENSITY | Facility: HOSPITAL | Age: 68
Discharge: HOME | End: 2018-11-26
Attending: INTERNAL MEDICINE
Payer: MEDICARE

## 2018-11-26 ENCOUNTER — HOSPITAL ENCOUNTER (OUTPATIENT)
Dept: MAMMOGRAPHY | Facility: HOSPITAL | Age: 68
Discharge: HOME | End: 2018-11-26
Attending: INTERNAL MEDICINE
Payer: MEDICARE

## 2018-11-26 DIAGNOSIS — R92.2 DENSE BREAST TISSUE ON MAMMOGRAM: Primary | ICD-10-CM

## 2018-11-26 DIAGNOSIS — Z12.31 ENCOUNTER FOR SCREENING MAMMOGRAM FOR BREAST CANCER: ICD-10-CM

## 2018-11-26 DIAGNOSIS — M81.8 OTHER OSTEOPOROSIS, UNSPECIFIED PATHOLOGICAL FRACTURE PRESENCE: ICD-10-CM

## 2018-11-26 PROCEDURE — 77080 DXA BONE DENSITY AXIAL: CPT | Performed by: INTERNAL MEDICINE

## 2018-11-26 PROCEDURE — 77067 SCR MAMMO BI INCL CAD: CPT | Performed by: INTERNAL MEDICINE

## 2018-11-26 PROCEDURE — 77063 BREAST TOMOSYNTHESIS BI: CPT | Performed by: INTERNAL MEDICINE

## 2018-11-26 NOTE — TELEPHONE ENCOUNTER
Mammogram was good. Repeat in 1 year. However she has very dense breasts and radiology is recommending an automated whole breast ultrasound. Have her do if agreeable.

## 2018-11-26 NOTE — TELEPHONE ENCOUNTER
Put in order. Relayed Dr. Jo Lan message to the patient. Pt verbalized understanding. Pt took number to call for US and will schedule this. Pt had no further questions regarding this at this time.

## 2018-12-04 ENCOUNTER — HOSPITAL ENCOUNTER (OUTPATIENT)
Dept: ULTRASOUND IMAGING | Facility: HOSPITAL | Age: 68
Discharge: HOME | End: 2018-12-04
Attending: INTERNAL MEDICINE
Payer: MEDICARE

## 2018-12-04 DIAGNOSIS — R92.2 DENSE BREAST TISSUE ON MAMMOGRAM: ICD-10-CM

## 2018-12-04 PROCEDURE — 76641 ULTRASOUND BREAST COMPLETE: CPT | Performed by: INTERNAL MEDICINE

## 2018-12-08 ENCOUNTER — TELEPHONE (OUTPATIENT)
Dept: NEPHROLOGY | Facility: CLINIC | Age: 68
End: 2018-12-08

## 2018-12-08 NOTE — TELEPHONE ENCOUNTER
The ultrasound of the breast shows a lesion in each breast that is probably just a cyst but do a targeted ultrasound of bilateral breasts to make sure it is nothing else.

## 2018-12-10 ENCOUNTER — TELEPHONE (OUTPATIENT)
Dept: NEPHROLOGY | Facility: CLINIC | Age: 68
End: 2018-12-10

## 2018-12-10 NOTE — TELEPHONE ENCOUNTER
Contacted pt and notified her of MKK's message below. She is already scheduled for targeted bilateral breasts U/S for 12/17/18.

## 2018-12-17 ENCOUNTER — HOSPITAL ENCOUNTER (OUTPATIENT)
Dept: ULTRASOUND IMAGING | Facility: HOSPITAL | Age: 68
Discharge: HOME | End: 2018-12-17
Attending: INTERNAL MEDICINE
Payer: MEDICARE

## 2018-12-17 DIAGNOSIS — R92.8 ABNORMAL MAMMOGRAM: ICD-10-CM

## 2018-12-17 PROCEDURE — 76642 ULTRASOUND BREAST LIMITED: CPT | Performed by: INTERNAL MEDICINE

## 2018-12-31 RX ORDER — LISINOPRIL AND HYDROCHLOROTHIAZIDE 20; 12.5 MG/1; MG/1
TABLET ORAL
Qty: 90 TABLET | Refills: 5 | Status: SHIPPED | OUTPATIENT
Start: 2018-12-31 | End: 2020-03-12

## 2019-01-10 ENCOUNTER — MYAURORA ACCOUNT LINK (OUTPATIENT)
Dept: OTHER | Age: 69
End: 2019-01-10

## 2019-01-10 ENCOUNTER — PRIOR ORIGINAL RECORDS (OUTPATIENT)
Dept: OTHER | Age: 69
End: 2019-01-10

## 2019-02-18 RX ORDER — FEXOFENADINE HYDROCHLORIDE AND PSEUDOEPHEDRINE HYDROCHLORIDE 180; 240 MG/1; MG/1
TABLET, FILM COATED, EXTENDED RELEASE ORAL
Qty: 30 TABLET | Refills: 3 | Status: SHIPPED | OUTPATIENT
Start: 2019-02-18 | End: 2019-04-30

## 2019-02-28 VITALS
DIASTOLIC BLOOD PRESSURE: 78 MMHG | SYSTOLIC BLOOD PRESSURE: 124 MMHG | RESPIRATION RATE: 16 BRPM | HEART RATE: 65 BPM | WEIGHT: 146 LBS

## 2019-02-28 VITALS
OXYGEN SATURATION: 98 % | BODY MASS INDEX: 29.23 KG/M2 | RESPIRATION RATE: 20 BRPM | WEIGHT: 145 LBS | HEART RATE: 92 BPM | SYSTOLIC BLOOD PRESSURE: 126 MMHG | HEIGHT: 59 IN | DIASTOLIC BLOOD PRESSURE: 82 MMHG

## 2019-02-28 VITALS
BODY MASS INDEX: 29.43 KG/M2 | DIASTOLIC BLOOD PRESSURE: 82 MMHG | HEART RATE: 95 BPM | OXYGEN SATURATION: 97 % | HEIGHT: 59 IN | WEIGHT: 146 LBS | SYSTOLIC BLOOD PRESSURE: 120 MMHG

## 2019-02-28 VITALS
RESPIRATION RATE: 18 BRPM | WEIGHT: 148 LBS | HEART RATE: 60 BPM | SYSTOLIC BLOOD PRESSURE: 139 MMHG | DIASTOLIC BLOOD PRESSURE: 70 MMHG

## 2019-03-01 VITALS
WEIGHT: 152 LBS | DIASTOLIC BLOOD PRESSURE: 76 MMHG | RESPIRATION RATE: 16 BRPM | HEART RATE: 70 BPM | SYSTOLIC BLOOD PRESSURE: 126 MMHG

## 2019-04-19 RX ORDER — NIACINAMIDE, ADENOSINE 5; .1 G/250ML; G/250ML
LIQUID TOPICAL
COMMUNITY

## 2019-04-19 RX ORDER — DABIGATRAN ETEXILATE 150 MG/1
CAPSULE ORAL
COMMUNITY
Start: 2018-12-20 | End: 2019-04-29 | Stop reason: ALTCHOICE

## 2019-04-19 RX ORDER — UBIDECARENONE 50 MG
CAPSULE ORAL
COMMUNITY

## 2019-04-19 RX ORDER — SUMATRIPTAN 50 MG/1
TABLET, FILM COATED ORAL
COMMUNITY
Start: 2011-10-11

## 2019-04-19 RX ORDER — SIMVASTATIN 40 MG
TABLET ORAL
COMMUNITY
End: 2020-12-07 | Stop reason: CLARIF

## 2019-04-19 RX ORDER — LISINOPRIL AND HYDROCHLOROTHIAZIDE 20; 12.5 MG/1; MG/1
TABLET ORAL
COMMUNITY

## 2019-04-19 RX ORDER — CHOLECALCIFEROL (VITAMIN D3) 125 MCG
CAPSULE ORAL
COMMUNITY

## 2019-04-29 ENCOUNTER — TELEPHONE (OUTPATIENT)
Dept: CARDIOLOGY | Age: 69
End: 2019-04-29

## 2019-04-30 DIAGNOSIS — R20.0 NUMBNESS: ICD-10-CM

## 2019-04-30 DIAGNOSIS — I51.9 MYXEDEMA HEART DISEASE: Primary | ICD-10-CM

## 2019-04-30 DIAGNOSIS — R20.2 PARESTHESIA: ICD-10-CM

## 2019-04-30 DIAGNOSIS — E78.00 PURE HYPERCHOLESTEROLEMIA: ICD-10-CM

## 2019-04-30 DIAGNOSIS — E03.9 MYXEDEMA HEART DISEASE: Primary | ICD-10-CM

## 2019-04-30 DIAGNOSIS — E03.9 ACQUIRED HYPOTHYROIDISM: ICD-10-CM

## 2019-04-30 RX ORDER — FEXOFENADINE HCL AND PSEUDOEPHEDRINE HCI 180; 240 MG/1; MG/1
1 TABLET, EXTENDED RELEASE ORAL
Qty: 30 TABLET | Refills: 3 | Status: SHIPPED | OUTPATIENT
Start: 2019-04-30 | End: 2020-04-07

## 2019-04-30 RX ORDER — ATORVASTATIN CALCIUM 40 MG/1
TABLET, FILM COATED ORAL
Qty: 30 TABLET | Refills: 0 | Status: SHIPPED | OUTPATIENT
Start: 2019-04-30 | End: 2019-05-31

## 2019-04-30 NOTE — TELEPHONE ENCOUNTER
Pt also requesting a refill for Rx ALLEGRA-D ALLERGY & CONGESTION 3 boxes.          Current Outpatient Medications:   •  ALLEGRA-D ALLERGY & CONGESTION 180-240 MG Oral Tablet 24 Hr, TAKE 1 TABLET BY MOUTH EVERY DAY, Disp: 30 tablet, Rfl: 3

## 2019-04-30 NOTE — TELEPHONE ENCOUNTER
LOV 11/9/18. RTC in 6 mos (5/2019) Last lipid panel was done on 11/7/18. Refill pended and routed to Dr. Tree Rice.

## 2019-05-14 ENCOUNTER — APPOINTMENT (OUTPATIENT)
Dept: CARDIOLOGY | Age: 69
End: 2019-05-14
Attending: INTERNAL MEDICINE

## 2019-05-20 ENCOUNTER — LAB ENCOUNTER (OUTPATIENT)
Dept: LAB | Age: 69
End: 2019-05-20
Attending: INTERNAL MEDICINE
Payer: MEDICARE

## 2019-05-20 DIAGNOSIS — E03.9 MYXEDEMA HEART DISEASE: ICD-10-CM

## 2019-05-20 DIAGNOSIS — I51.9 MYXEDEMA HEART DISEASE: ICD-10-CM

## 2019-05-20 DIAGNOSIS — R20.0 NUMBNESS: ICD-10-CM

## 2019-05-20 DIAGNOSIS — E03.9 ACQUIRED HYPOTHYROIDISM: ICD-10-CM

## 2019-05-20 DIAGNOSIS — E78.00 PURE HYPERCHOLESTEROLEMIA: ICD-10-CM

## 2019-05-20 DIAGNOSIS — R20.2 PARESTHESIA: ICD-10-CM

## 2019-05-20 LAB
ABSOLUTE IMMATURE GRANULOCYTES (OFFPRE24): NORMAL
ALBUMIN SERPL-MCNC: 4.2 G/DL
ALBUMIN/GLOB SERPL: 1.2 {RATIO}
ALP SERPL-CCNC: 90 U/L
ALT SERPL-CCNC: 28 U/L
ANION GAP SERPL CALC-SCNC: 7 MMOL/L
AST SERPL-CCNC: 21 U/L
BASO+EOS+MONOS # BLD: NORMAL 10*3/UL
BASO+EOS+MONOS NFR BLD: NORMAL %
BASOPHILS # BLD: NORMAL 10*3/UL
BASOPHILS NFR BLD: NORMAL %
BILIRUB SERPL-MCNC: 0.7 MG/DL
BUN SERPL-MCNC: 14 MG/DL
BUN/CREAT SERPL: 18.9
CALCIUM SERPL-MCNC: 9.5 MG/DL
CHLORIDE SERPL-SCNC: 105 MMOL/L
CHOLEST SERPL-MCNC: 149 MG/DL
CHOLEST/HDLC SERPL: 59 {RATIO}
CO2 SERPL-SCNC: 29 MMOL/L
CREAT SERPL-MCNC: 0.74 MG/DL
DIFFERENTIAL METHOD BLD: NORMAL
EOSINOPHIL # BLD: NORMAL 10*3/UL
EOSINOPHIL NFR BLD: NORMAL %
ERYTHROCYTE [DISTWIDTH] IN BLOOD: NORMAL %
GLOBULIN SER-MCNC: 3.5 G/DL
GLUCOSE SERPL-MCNC: 83 MG/DL
HCT VFR BLD CALC: 42.1 %
HDLC SERPL-MCNC: NORMAL MG/DL
HGB BLD-MCNC: 13.6 G/DL
IMMATURE GRANULOCYTES (OFFPRE25): NORMAL
LDLC SERPL CALC-MCNC: 75 MG/DL
LENGTH OF FAST TIME PATIENT: NORMAL H
LENGTH OF FAST TIME PATIENT: NORMAL H
LYMPHOCYTES # BLD: NORMAL 10*3/UL
LYMPHOCYTES NFR BLD: NORMAL %
MCH RBC QN AUTO: NORMAL PG
MCHC RBC AUTO-ENTMCNC: NORMAL G/DL
MCV RBC AUTO: NORMAL FL
MONOCYTES # BLD: NORMAL 10*3/UL
MONOCYTES NFR BLD: NORMAL %
MPV (OFFPRE2): NORMAL
NEUTROPHILS # BLD: NORMAL 10*3/UL
NEUTROPHILS NFR BLD: NORMAL %
NONHDLC SERPL-MCNC: 90 MG/DL
NRBC BLD MANUAL-RTO: NORMAL %
PLAT MORPH BLD: NORMAL
PLATELET # BLD: NORMAL 10*3/UL
POTASSIUM SERPL-SCNC: 3.8 MMOL/L
PROT SERPL-MCNC: 7.7 G/DL
RBC # BLD: 4.95 10*6/UL
RBC MORPH BLD: NORMAL
SODIUM SERPL-SCNC: 141 MMOL/L
TRIGL SERPL-MCNC: 76 MG/DL
VLDLC SERPL CALC-MCNC: 15 MG/DL
WBC # BLD: 4.4 10*3/UL
WBC MORPH BLD: NORMAL

## 2019-05-20 PROCEDURE — 86038 ANTINUCLEAR ANTIBODIES: CPT

## 2019-05-20 PROCEDURE — 82607 VITAMIN B-12: CPT

## 2019-05-20 PROCEDURE — 85025 COMPLETE CBC W/AUTO DIFF WBC: CPT

## 2019-05-20 PROCEDURE — 85652 RBC SED RATE AUTOMATED: CPT

## 2019-05-20 PROCEDURE — 81003 URINALYSIS AUTO W/O SCOPE: CPT

## 2019-05-20 PROCEDURE — 84443 ASSAY THYROID STIM HORMONE: CPT

## 2019-05-20 PROCEDURE — 36415 COLL VENOUS BLD VENIPUNCTURE: CPT

## 2019-05-20 PROCEDURE — 80061 LIPID PANEL: CPT

## 2019-05-20 PROCEDURE — 80053 COMPREHEN METABOLIC PANEL: CPT

## 2019-05-31 ENCOUNTER — OFFICE VISIT (OUTPATIENT)
Dept: NEPHROLOGY | Facility: CLINIC | Age: 69
End: 2019-05-31
Payer: MEDICARE

## 2019-05-31 VITALS
HEIGHT: 59 IN | DIASTOLIC BLOOD PRESSURE: 70 MMHG | HEART RATE: 74 BPM | WEIGHT: 143.63 LBS | BODY MASS INDEX: 28.96 KG/M2 | SYSTOLIC BLOOD PRESSURE: 112 MMHG

## 2019-05-31 DIAGNOSIS — I10 ESSENTIAL HYPERTENSION: Primary | ICD-10-CM

## 2019-05-31 DIAGNOSIS — E03.9 ACQUIRED HYPOTHYROIDISM: ICD-10-CM

## 2019-05-31 DIAGNOSIS — E78.00 PURE HYPERCHOLESTEROLEMIA: ICD-10-CM

## 2019-05-31 PROCEDURE — G0439 PPPS, SUBSEQ VISIT: HCPCS | Performed by: INTERNAL MEDICINE

## 2019-05-31 RX ORDER — ATORVASTATIN CALCIUM 40 MG/1
TABLET, FILM COATED ORAL
Qty: 30 TABLET | Refills: 5 | Status: SHIPPED | OUTPATIENT
Start: 2019-05-31 | End: 2019-05-31

## 2019-05-31 RX ORDER — LISINOPRIL AND HYDROCHLOROTHIAZIDE 20; 12.5 MG/1; MG/1
TABLET ORAL
COMMUNITY
End: 2019-05-31

## 2019-05-31 NOTE — TELEPHONE ENCOUNTER
LOV 11/9/18. Last lipid panel was done on 5/20/19. RTC in 6 mos (5/2019) Appointment is scheduled for today 5/31/19. Refill pended and routed to Dr. Yesy Tate.

## 2019-06-01 NOTE — PROGRESS NOTES
[facility], [facility]  Nephrology Daily Progress Note    Kaci Mishra  [id number]  77year old  Patient presents with: Well Adult: Medicare Annual Wellness      HPI:   Kaci Mishra is a 77year old female.   74-year old female with a history of hypertens WEIGHT ONLY 5/21/2018 11/9/2018 5/31/2019   Weight 154 lbs 150 lbs 143 lbs 10 oz       Constitutional: appears well hydrated alert and responsive no acute distress noted  Neck/Thyroid: neck is supple without adenopathy  Lymphatic: no abnormal cervical, sup DO NOT EXCEED 4 TABLETS IN 24 HOURS), Disp: 9 tablet, Rfl: 5  •  Red Yeast Rice Extract 300 MG Oral Cap, Take 1 capsule by mouth daily. , Disp: , Rfl:   •  ATORVASTATIN 40 MG Oral Tab, TAKE 1 TABLET BY MOUTH EVERY NIGHT, Disp: 90 tablet, Rfl: 1  •  SUMATRIP

## 2019-06-07 RX ORDER — LEVOTHYROXINE SODIUM 0.07 MG/1
TABLET ORAL
Qty: 90 TABLET | Refills: 1 | Status: SHIPPED | OUTPATIENT
Start: 2019-06-07 | End: 2019-12-12

## 2019-06-12 ENCOUNTER — ANCILLARY ORDERS (OUTPATIENT)
Dept: CARDIOLOGY | Age: 69
End: 2019-06-12

## 2019-06-12 ENCOUNTER — ANCILLARY PROCEDURE (OUTPATIENT)
Dept: CARDIOLOGY | Age: 69
End: 2019-06-12
Attending: INTERNAL MEDICINE

## 2019-06-12 DIAGNOSIS — Z95.0 CARDIAC PACEMAKER: ICD-10-CM

## 2019-06-12 PROCEDURE — 93294 REM INTERROG EVL PM/LDLS PM: CPT | Performed by: INTERNAL MEDICINE

## 2019-08-27 RX ORDER — SUMATRIPTAN 50 MG/1
TABLET, FILM COATED ORAL
Qty: 9 TABLET | Refills: 2 | Status: SHIPPED | OUTPATIENT
Start: 2019-08-27 | End: 2020-04-20

## 2019-09-16 ENCOUNTER — ANCILLARY PROCEDURE (OUTPATIENT)
Dept: CARDIOLOGY | Age: 69
End: 2019-09-16
Attending: INTERNAL MEDICINE

## 2019-09-16 ENCOUNTER — ANCILLARY ORDERS (OUTPATIENT)
Dept: CARDIOLOGY | Age: 69
End: 2019-09-16

## 2019-09-16 DIAGNOSIS — Z95.0 PACEMAKER: ICD-10-CM

## 2019-09-16 PROCEDURE — 93294 REM INTERROG EVL PM/LDLS PM: CPT | Performed by: INTERNAL MEDICINE

## 2019-09-16 PROCEDURE — X1114 CARDIAC DEVICE HOME CHECK - REMOTE UNSCHEDULED: HCPCS | Performed by: INTERNAL MEDICINE

## 2019-11-29 RX ORDER — ATORVASTATIN CALCIUM 40 MG/1
40 TABLET, FILM COATED ORAL NIGHTLY
Qty: 30 TABLET | Refills: 0 | Status: SHIPPED | OUTPATIENT
Start: 2019-11-29 | End: 2019-12-30

## 2019-11-29 RX ORDER — RIVAROXABAN 20 MG/1
TABLET, FILM COATED ORAL
Qty: 30 TABLET | Refills: 0 | Status: SHIPPED | OUTPATIENT
Start: 2019-11-29 | End: 2019-12-30 | Stop reason: SDUPTHER

## 2019-11-29 RX ORDER — ATORVASTATIN CALCIUM 40 MG/1
TABLET, FILM COATED ORAL
Qty: 90 TABLET | Refills: 0 | OUTPATIENT
Start: 2019-11-29

## 2019-11-29 NOTE — TELEPHONE ENCOUNTER
LOV 5/31/19. RTC in 6 mos (11/2019) No f/u scheduled. Last lipid panel was done on 5/20/19. Medication refilled 1 time per written protocol in Dr. Granger El absence from the office this week.

## 2019-11-29 NOTE — TELEPHONE ENCOUNTER
Pharmacy requesting 90 day supply on atorvastatin, which eTena Castellon sent in today for a 30 day supply since pt is due for an appt. 90 day supply denied.

## 2019-12-12 ENCOUNTER — TELEPHONE (OUTPATIENT)
Dept: NEPHROLOGY | Facility: CLINIC | Age: 69
End: 2019-12-12

## 2019-12-12 RX ORDER — LEVOTHYROXINE SODIUM 0.07 MG/1
TABLET ORAL
Qty: 90 TABLET | Refills: 0 | Status: SHIPPED | OUTPATIENT
Start: 2019-12-12 | End: 2020-03-05

## 2019-12-12 NOTE — TELEPHONE ENCOUNTER
Medicare Wellness Physical scheduled for 12/20/19.  Refill pended and routed to Dr. Evangelina Reyes for approval.

## 2019-12-12 NOTE — TELEPHONE ENCOUNTER
Patient requesting refills for Levothyroxine. Please call. Thank you.     Current Outpatient Medications   Medication Sig Dispense Refill   • LEVOTHYROXINE SODIUM 75 MCG Oral Tab TAKE 1 TABLET(75 MCG) BY MOUTH EVERY DAY BEFORE BREAKFAST 90 tablet 1

## 2019-12-12 NOTE — TELEPHONE ENCOUNTER
Patient Demands to be seen prior to new year for Medicare Annual [address] - CSS offered next avail with Dr Justice Delacruz (1/14/20) and patient declined offer. Patient states she is going out of town on 1/8/20 and will be gone for 3 months.   CSS offered to transfer pa

## 2019-12-12 NOTE — TELEPHONE ENCOUNTER
FYI - patient returned call and scheduled appointment to see Dr Haseeb Dodd on 12/20/2019. Thank you.

## 2019-12-17 ENCOUNTER — ANCILLARY PROCEDURE (OUTPATIENT)
Dept: CARDIOLOGY | Age: 69
End: 2019-12-17
Attending: INTERNAL MEDICINE

## 2019-12-17 DIAGNOSIS — Z95.0 CARDIAC PACEMAKER: ICD-10-CM

## 2019-12-17 PROCEDURE — 93294 REM INTERROG EVL PM/LDLS PM: CPT | Performed by: INTERNAL MEDICINE

## 2019-12-20 ENCOUNTER — OFFICE VISIT (OUTPATIENT)
Dept: CARDIOLOGY | Age: 69
End: 2019-12-20

## 2019-12-20 ENCOUNTER — OFFICE VISIT (OUTPATIENT)
Dept: NEPHROLOGY | Facility: CLINIC | Age: 69
End: 2019-12-20
Payer: MEDICARE

## 2019-12-20 VITALS
TEMPERATURE: 98 F | DIASTOLIC BLOOD PRESSURE: 76 MMHG | HEART RATE: 68 BPM | BODY MASS INDEX: 30.52 KG/M2 | HEIGHT: 58.5 IN | SYSTOLIC BLOOD PRESSURE: 128 MMHG | WEIGHT: 149.38 LBS

## 2019-12-20 VITALS
OXYGEN SATURATION: 97 % | HEIGHT: 59 IN | WEIGHT: 145 LBS | SYSTOLIC BLOOD PRESSURE: 122 MMHG | HEART RATE: 99 BPM | DIASTOLIC BLOOD PRESSURE: 66 MMHG | BODY MASS INDEX: 29.23 KG/M2

## 2019-12-20 DIAGNOSIS — I49.5 SICK SINUS SYNDROME (CMD): ICD-10-CM

## 2019-12-20 DIAGNOSIS — I10 ESSENTIAL HYPERTENSION: Primary | ICD-10-CM

## 2019-12-20 DIAGNOSIS — E78.00 PURE HYPERCHOLESTEROLEMIA: ICD-10-CM

## 2019-12-20 DIAGNOSIS — E78.5 DYSLIPIDEMIA: ICD-10-CM

## 2019-12-20 DIAGNOSIS — Z00.00 ENCOUNTER FOR MEDICARE ANNUAL WELLNESS EXAM: ICD-10-CM

## 2019-12-20 DIAGNOSIS — Z95.0 PRESENCE OF CARDIAC PACEMAKER: ICD-10-CM

## 2019-12-20 PROBLEM — R06.02 SOBOE (SHORTNESS OF BREATH ON EXERTION): Status: ACTIVE | Noted: 2019-12-20

## 2019-12-20 PROCEDURE — 99214 OFFICE O/P EST MOD 30 MIN: CPT | Performed by: INTERNAL MEDICINE

## 2019-12-20 PROCEDURE — G0463 HOSPITAL OUTPT CLINIC VISIT: HCPCS | Performed by: INTERNAL MEDICINE

## 2019-12-20 RX ORDER — LEVOTHYROXINE SODIUM 0.07 MG/1
75 TABLET ORAL DAILY
COMMUNITY

## 2019-12-20 RX ORDER — ACETAMINOPHEN 160 MG
2000 TABLET,DISINTEGRATING ORAL NIGHTLY
COMMUNITY

## 2019-12-20 ASSESSMENT — PATIENT HEALTH QUESTIONNAIRE - PHQ9
2. FEELING DOWN, DEPRESSED OR HOPELESS: NOT AT ALL
SUM OF ALL RESPONSES TO PHQ9 QUESTIONS 1 AND 2: 0
SUM OF ALL RESPONSES TO PHQ9 QUESTIONS 1 AND 2: 0
1. LITTLE INTEREST OR PLEASURE IN DOING THINGS: NOT AT ALL

## 2019-12-21 NOTE — PATIENT INSTRUCTIONS
Do your cholesterol blood test and then place Lipitor on hold. Let me know if that improves your joint pains.

## 2019-12-21 NOTE — PROGRESS NOTES
[facility], [facility]  Nephrology Daily Progress Note    Gricelda Elizondo  [id number]  68year old  Patient presents with: Annual: Medicare Annual Well Visit      HPI:   Gricelda Elizondo is a 68year old female.   17-year-old female with a history of hypertensio 12/20/2019   /70 - 128/76   Pulse 74 - 68   Resp - - -   Temp - - 98.3   SpO2 - - -   Weight 143 lbs 10 oz - 149 lbs 6 oz   Height 59.000 - 58.5   BODY MASS INDEX - 30.69 -       VITALS: WEIGHT ONLY 11/9/2018 5/31/2019 12/20/2019   Weight 150 lbs 143 SUMATRIPTAN SUCCINATE 50 MG Oral Tab, TAKE 2 TABLETS AS SOON AS ONSET OF MIGRAINE ATTACK, MAY REPEAT AFTER 2 HOURS IF HEADACHE RETURNS,( DO NOT EXCEED 4 TABLETS IN 24 HOURS), Disp: 9 tablet, Rfl: 2  •  Fexofenadine-Pseudoephed ER (ALLEGRA-D ALLERGY & CONGE were reviewed.          Orders Placed This Encounter      Hepatic Function Panel (7) [E]      Lipid Panel      12/20/2019  Cristela Hendricks MD

## 2019-12-27 ENCOUNTER — APPOINTMENT (OUTPATIENT)
Dept: LAB | Age: 69
End: 2019-12-27
Attending: INTERNAL MEDICINE
Payer: MEDICARE

## 2019-12-27 DIAGNOSIS — E78.00 PURE HYPERCHOLESTEROLEMIA: ICD-10-CM

## 2019-12-27 PROCEDURE — 80061 LIPID PANEL: CPT

## 2019-12-27 PROCEDURE — 36415 COLL VENOUS BLD VENIPUNCTURE: CPT

## 2019-12-27 PROCEDURE — 80076 HEPATIC FUNCTION PANEL: CPT

## 2019-12-30 RX ORDER — RIVAROXABAN 20 MG/1
TABLET, FILM COATED ORAL
Qty: 30 TABLET | Refills: 0 | Status: SHIPPED | OUTPATIENT
Start: 2019-12-30 | End: 2020-01-30 | Stop reason: SDUPTHER

## 2019-12-30 RX ORDER — ATORVASTATIN CALCIUM 40 MG/1
TABLET, FILM COATED ORAL
Qty: 30 TABLET | Refills: 5 | Status: SHIPPED | OUTPATIENT
Start: 2019-12-30 | End: 2020-01-15

## 2019-12-30 NOTE — TELEPHONE ENCOUNTER
LOV 12/20/19. Last lipid panel was done on 12/27/19.  Refill pended and routed to Dr. Teddy Montoya for approval.

## 2020-01-06 ENCOUNTER — ANCILLARY PROCEDURE (OUTPATIENT)
Dept: CARDIOLOGY | Age: 70
End: 2020-01-06
Attending: INTERNAL MEDICINE

## 2020-01-06 VITALS
RESPIRATION RATE: 14 BRPM | BODY MASS INDEX: 30.09 KG/M2 | HEART RATE: 66 BPM | WEIGHT: 149 LBS | DIASTOLIC BLOOD PRESSURE: 80 MMHG | SYSTOLIC BLOOD PRESSURE: 130 MMHG

## 2020-01-06 DIAGNOSIS — Z45.018 PACEMAKER REPROGRAMMING/CHECK: Primary | ICD-10-CM

## 2020-01-06 PROCEDURE — 93280 PM DEVICE PROGR EVAL DUAL: CPT | Performed by: INTERNAL MEDICINE

## 2020-01-30 RX ORDER — RIVAROXABAN 20 MG/1
TABLET, FILM COATED ORAL
Qty: 30 TABLET | Refills: 4 | Status: SHIPPED | OUTPATIENT
Start: 2020-01-30 | End: 2020-09-18 | Stop reason: SDUPTHER

## 2020-03-04 ENCOUNTER — ANCILLARY ORDERS (OUTPATIENT)
Dept: CARDIOLOGY | Age: 70
End: 2020-03-04

## 2020-03-04 ENCOUNTER — ANCILLARY PROCEDURE (OUTPATIENT)
Dept: CARDIOLOGY | Age: 70
End: 2020-03-04
Attending: INTERNAL MEDICINE

## 2020-03-04 DIAGNOSIS — Z95.0 CARDIAC PACEMAKER: ICD-10-CM

## 2020-03-04 PROCEDURE — X1114 CARDIAC DEVICE HOME CHECK - REMOTE UNSCHEDULED: HCPCS | Performed by: INTERNAL MEDICINE

## 2020-03-04 PROCEDURE — X1094 NO CHARGE VISIT: HCPCS | Performed by: INTERNAL MEDICINE

## 2020-03-05 RX ORDER — LEVOTHYROXINE SODIUM 0.07 MG/1
TABLET ORAL
Qty: 90 TABLET | Refills: 0 | Status: SHIPPED | OUTPATIENT
Start: 2020-03-05 | End: 2020-05-28

## 2020-03-05 NOTE — TELEPHONE ENCOUNTER
Current Outpatient Medications   Medication Sig Dispense Refill   •    5   •    0   •       •       •       • Levothyroxine Sodium 75 MCG Oral Tab TAKE 1 TABLET(75 MCG) BY MOUTH EVERY DAY BEFORE BREAKFAST 90 tablet 0

## 2020-03-09 ENCOUNTER — APPOINTMENT (OUTPATIENT)
Dept: LAB | Age: 70
End: 2020-03-09
Attending: INTERNAL MEDICINE
Payer: MEDICARE

## 2020-03-09 DIAGNOSIS — E78.00 PURE HYPERCHOLESTEROLEMIA: ICD-10-CM

## 2020-03-09 LAB
ALBUMIN SERPL-MCNC: 4.1 G/DL (ref 3.4–5)
ALP LIVER SERPL-CCNC: 72 U/L (ref 55–142)
ALT SERPL-CCNC: 31 U/L (ref 13–56)
AST SERPL-CCNC: 21 U/L (ref 15–37)
BILIRUB DIRECT SERPL-MCNC: 0.2 MG/DL (ref 0–0.2)
BILIRUB SERPL-MCNC: 0.7 MG/DL (ref 0.1–2)
CHOLEST SMN-MCNC: 189 MG/DL (ref ?–200)
HDLC SERPL-MCNC: 61 MG/DL (ref 40–59)
LDLC SERPL CALC-MCNC: 108 MG/DL (ref ?–100)
M PROTEIN MFR SERPL ELPH: 7.5 G/DL (ref 6.4–8.2)
NONHDLC SERPL-MCNC: 128 MG/DL (ref ?–130)
PATIENT FASTING Y/N/NP: YES
TRIGL SERPL-MCNC: 98 MG/DL (ref 30–149)
VLDLC SERPL CALC-MCNC: 20 MG/DL (ref 0–30)

## 2020-03-09 PROCEDURE — 36415 COLL VENOUS BLD VENIPUNCTURE: CPT

## 2020-03-09 PROCEDURE — 80061 LIPID PANEL: CPT

## 2020-03-09 PROCEDURE — 80076 HEPATIC FUNCTION PANEL: CPT

## 2020-03-10 ENCOUNTER — TELEPHONE (OUTPATIENT)
Dept: NEPHROLOGY | Facility: CLINIC | Age: 70
End: 2020-03-10

## 2020-03-10 DIAGNOSIS — E78.00 PURE HYPERCHOLESTEROLEMIA: Primary | ICD-10-CM

## 2020-03-11 NOTE — TELEPHONE ENCOUNTER
Bad cholesterol still little high. If no side effects increase Crestor to 10 mg daily. Watch diet and exercise. Liver and lipid panel in 6 weeks.

## 2020-03-12 RX ORDER — ROSUVASTATIN CALCIUM 10 MG/1
10 TABLET, COATED ORAL NIGHTLY
Qty: 90 TABLET | Refills: 0 | Status: SHIPPED | OUTPATIENT
Start: 2020-03-12 | End: 2020-03-12

## 2020-03-12 RX ORDER — LISINOPRIL AND HYDROCHLOROTHIAZIDE 20; 12.5 MG/1; MG/1
TABLET ORAL
Qty: 10 TABLET | Refills: 0 | Status: SHIPPED | OUTPATIENT
Start: 2020-03-12 | End: 2020-03-23

## 2020-03-12 RX ORDER — ROSUVASTATIN CALCIUM 10 MG/1
10 TABLET, COATED ORAL NIGHTLY
Qty: 90 TABLET | Refills: 0 | Status: SHIPPED | OUTPATIENT
Start: 2020-03-12 | End: 2020-06-02

## 2020-03-12 RX ORDER — LISINOPRIL AND HYDROCHLOROTHIAZIDE 20; 12.5 MG/1; MG/1
1 TABLET ORAL
Qty: 10 TABLET | Refills: 5 | Status: SHIPPED | OUTPATIENT
Start: 2020-03-12 | End: 2020-03-12

## 2020-03-12 NOTE — TELEPHONE ENCOUNTER
Patient returned call. Results message read. She denies having any side effects from Crestor and is aware of dose increase. New prescription sent to [address] [place] [address]. She will do lab work again 6 weeks after starting new dose. Med list updated.  Orders en

## 2020-03-12 NOTE — TELEPHONE ENCOUNTER
Patient called and updated her preferred [facility]. Patient ask to [facility], thanks.

## 2020-03-23 ENCOUNTER — TELEPHONE (OUTPATIENT)
Dept: NEPHROLOGY | Facility: CLINIC | Age: 70
End: 2020-03-23

## 2020-03-23 RX ORDER — LISINOPRIL AND HYDROCHLOROTHIAZIDE 20; 12.5 MG/1; MG/1
TABLET ORAL
Qty: 30 TABLET | Refills: 2 | Status: SHIPPED | OUTPATIENT
Start: 2020-03-23 | End: 2020-06-16

## 2020-03-23 RX ORDER — MELOXICAM 15 MG/1
15 TABLET ORAL DAILY
Qty: 20 TABLET | Refills: 0 | Status: SHIPPED | OUTPATIENT
Start: 2020-03-23 | End: 2020-06-02

## 2020-03-23 NOTE — TELEPHONE ENCOUNTER
Patient indicates her right hand is swollen and she can not use, started yesterday. Patient has placed heat and ice, nothing helping. Please call at:[phone],thanks.

## 2020-03-23 NOTE — TELEPHONE ENCOUNTER
Patient c/o right wrist pain x 1 day. States feels theres a \"knot/lump\" on the back of her wrist that she feels. Rates pain a 9/10 at this time. States her muscle usually tends to cramp, but states the swelling is causing the pain.  States pain gets worse

## 2020-03-23 NOTE — TELEPHONE ENCOUNTER
Spoke to patient and relayed Dr. Mike Means message as shown below. Patient verbalized understanding and had no further questions at this time.

## 2020-04-07 ENCOUNTER — TELEPHONE (OUTPATIENT)
Dept: NEPHROLOGY | Facility: CLINIC | Age: 70
End: 2020-04-07

## 2020-04-07 RX ORDER — CYCLOBENZAPRINE HCL 5 MG
TABLET ORAL 3 TIMES DAILY PRN
Qty: 20 TABLET | Refills: 0 | Status: SHIPPED | OUTPATIENT
Start: 2020-04-07 | End: 2020-06-02

## 2020-04-07 RX ORDER — FEXOFENADINE HCL AND PSEUDOEPHEDRINE HCI 180; 240 MG/1; MG/1
1 TABLET, EXTENDED RELEASE ORAL
Qty: 30 TABLET | Refills: 3 | Status: SHIPPED | OUTPATIENT
Start: 2020-04-07 | End: 2020-11-16

## 2020-04-07 NOTE — TELEPHONE ENCOUNTER
Make sure she is not having any shooting pains or paresthesias down her upper extremities. If not can use heating pads, hot showers, BenGay. Also start Flexeril 5 mg, 1-2 3 times daily, #20. Tylenol extra strength up to 1000 mg 3 times daily. Would need to see if not better.

## 2020-04-07 NOTE — TELEPHONE ENCOUNTER
Spoke to pt. States she called a couple weeks ago with hand swelling, was put on steroid pack, and symptoms improved. However, now she is having sharp neck pain. Onset 3 days ago. Cannot move head without severe pain. Wearing neck brace to help. Feels very tender behind ear going to center of neck. Feels like it may be swollen. Cannot sleep. When she sits up, gets a sharp pain that goes across entire neck; not unilateral. Has used heat, ice, and Tylenol. Denies fever. Denies injury. Only thing she can possibly contribute this to is that she has been reading a lot lately so her head has been in a downward position.

## 2020-04-07 NOTE — TELEPHONE ENCOUNTER
Contacted pt and notified her of Dr. Sandee Muller message and instructions below. Pt also requests refill on Allegra-D since allergies are starting to act up.

## 2020-04-20 RX ORDER — SUMATRIPTAN 50 MG/1
50 TABLET, FILM COATED ORAL EVERY 2 HOUR PRN
Qty: 9 TABLET | Refills: 2 | Status: SHIPPED | OUTPATIENT
Start: 2020-04-20 | End: 2020-10-14

## 2020-05-27 NOTE — TELEPHONE ENCOUNTER
Last seen 12/20/19. Return to clinic in 6 mos (6/2020) No follow up scheduled. Refill pended and routed to Dr. Evangelina Reyes.

## 2020-05-28 RX ORDER — LEVOTHYROXINE SODIUM 0.07 MG/1
TABLET ORAL
Qty: 90 TABLET | Refills: 0 | Status: SHIPPED | OUTPATIENT
Start: 2020-05-28 | End: 2020-08-25

## 2020-06-01 ENCOUNTER — PATIENT MESSAGE (OUTPATIENT)
Dept: NEPHROLOGY | Facility: CLINIC | Age: 70
End: 2020-06-01

## 2020-06-01 NOTE — TELEPHONE ENCOUNTER
From: Sg House  To: Jacinta Mccartney MD  Sent: 6/1/2020 10:56 AM CDT  Subject: Other    Is there any available appt tomorrow? I am having bladder problems and the hemorrhoid is bleeding again.  I am not watching grandchildren tomorrow so if there is

## 2020-06-02 ENCOUNTER — OFFICE VISIT (OUTPATIENT)
Dept: NEPHROLOGY | Facility: CLINIC | Age: 70
End: 2020-06-02
Payer: MEDICARE

## 2020-06-02 ENCOUNTER — TELEPHONE (OUTPATIENT)
Dept: NEPHROLOGY | Facility: CLINIC | Age: 70
End: 2020-06-02

## 2020-06-02 VITALS
SYSTOLIC BLOOD PRESSURE: 113 MMHG | HEIGHT: 58 IN | DIASTOLIC BLOOD PRESSURE: 71 MMHG | HEART RATE: 88 BPM | WEIGHT: 141.19 LBS | BODY MASS INDEX: 29.64 KG/M2

## 2020-06-02 DIAGNOSIS — K62.5 RECTAL BLEEDING: Primary | ICD-10-CM

## 2020-06-02 DIAGNOSIS — N39.490 OVERFLOW INCONTINENCE OF URINE: ICD-10-CM

## 2020-06-02 DIAGNOSIS — E03.9 ACQUIRED HYPOTHYROIDISM: ICD-10-CM

## 2020-06-02 PROCEDURE — G0463 HOSPITAL OUTPT CLINIC VISIT: HCPCS | Performed by: INTERNAL MEDICINE

## 2020-06-02 PROCEDURE — 99215 OFFICE O/P EST HI 40 MIN: CPT | Performed by: INTERNAL MEDICINE

## 2020-06-02 RX ORDER — ROSUVASTATIN CALCIUM 10 MG/1
TABLET, COATED ORAL
Qty: 90 TABLET | Refills: 0 | Status: SHIPPED | OUTPATIENT
Start: 2020-06-02 | End: 2020-08-25

## 2020-06-02 NOTE — TELEPHONE ENCOUNTER
Last seen 12/20/19. Return to clinic in 6 mos (6/2020) Appointment is scheduled for today 6/2/2020. Refill pended and routed to Dr. Jamilah Henriquez. Last lipid panel was done on 3/9/2020.

## 2020-06-02 NOTE — PROGRESS NOTES
[facility], [facility]  Nephrology Daily Progress Note    Jarvis Grant  [id number]  68year old  Patient presents with:  Anal Problem      HPI:   Jarvis Grant is a 68year old female.   43-year-old female with a history of hypertension, hypercholesterolemi hematuria  Hema/Lymph:  Negative for easy bleeding and easy bruising  Integumentary:  Negative for pruritus and rash  Musculoskeletal:  Negative for bone/joint symptoms  Neurological:  Negative for gait disturbance  Psychiatric:  Negative for inappropriate Oral Tab, Take 1 tablet (20 mg total) by mouth daily with food. , Disp: 30 tablet, Rfl: 0  •  Echinacea-Goldenseal (ECHINACEA COMB/SAINI SEAL OR), , Disp: , Rfl:   •  Hydrocortisone Acetate (ANUSOL-HC) 25 MG Rectal Suppos, Place 1 suppository (25 mg total) Recommended though she also follow-up with GI as she is close to being due for a follow-up colonoscopy anyway. Need to exclude any other causes for bright red blood per rectum. I also recommended she see gynecology as she has not done so for a while.   Ne

## 2020-06-02 NOTE — TELEPHONE ENCOUNTER
Patient requested medications be removed from her chart as she is no longer taking medications.  Added Xarelto

## 2020-06-09 NOTE — H&P
[facility] Follow-up Visit    Carly ([facility].)     managed   • Abnormal cholesterol test 1998    CHOLESTEROL 1998 PER.  NG.   • Arthritis    • H/O complete eye exam 1997   • H/O diagnostic mammography [id number]   • H/O exercise stress test 1995   • HTN (hypertension)    • Hyperlipidemia    • Hyper ER (ALLEGRA-D ALLERGY & CONGESTION) 180-240 MG Oral Tablet 24 Hr Take 1 tablet by mouth once daily. 30 tablet 3   • Calcium Carb-Cholecalciferol (CALCIUM 1000 + D OR) Take 1 tablet by mouth 2 (two) times daily.      • Coenzyme Q10-Red Yeast Rice (CO Q-10 PL behavior is normal    Nursing note and vitals reviewed      Labs/Imaging:     Patient's labs and imaging were reviewed and discussed with patient today. See HPI and A&P for further details.      .  ASSESSMENT/PLAN:   Luke Reed is a 68year old year- have discussed the risks (including risk of delayed/missed diagnosis), benefits, and alternatives to colonoscopy with the patient [who demonstrated understanding], including but not limited to the risks of bleeding, infection, pain, as well as the risks of

## 2020-06-12 ENCOUNTER — ANCILLARY PROCEDURE (OUTPATIENT)
Dept: CARDIOLOGY | Age: 70
End: 2020-06-12
Attending: INTERNAL MEDICINE

## 2020-06-12 ENCOUNTER — ANCILLARY ORDERS (OUTPATIENT)
Dept: CARDIOLOGY | Age: 70
End: 2020-06-12

## 2020-06-12 DIAGNOSIS — Z95.0 CARDIAC PACEMAKER: ICD-10-CM

## 2020-06-12 PROCEDURE — 93294 REM INTERROG EVL PM/LDLS PM: CPT | Performed by: INTERNAL MEDICINE

## 2020-06-12 PROCEDURE — X1114 CARDIAC DEVICE HOME CHECK - REMOTE UNSCHEDULED: HCPCS | Performed by: INTERNAL MEDICINE

## 2020-06-16 ENCOUNTER — LAB ENCOUNTER (OUTPATIENT)
Dept: LAB | Facility: HOSPITAL | Age: 70
End: 2020-06-16
Attending: INTERNAL MEDICINE
Payer: MEDICARE

## 2020-06-16 DIAGNOSIS — E03.9 ACQUIRED HYPOTHYROIDISM: ICD-10-CM

## 2020-06-16 DIAGNOSIS — K62.5 RECTAL BLEEDING: ICD-10-CM

## 2020-06-16 DIAGNOSIS — N39.490 OVERFLOW INCONTINENCE OF URINE: ICD-10-CM

## 2020-06-16 PROCEDURE — 87086 URINE CULTURE/COLONY COUNT: CPT

## 2020-06-16 PROCEDURE — 81001 URINALYSIS AUTO W/SCOPE: CPT

## 2020-06-16 PROCEDURE — 36415 COLL VENOUS BLD VENIPUNCTURE: CPT

## 2020-06-16 PROCEDURE — 85025 COMPLETE CBC W/AUTO DIFF WBC: CPT

## 2020-06-16 PROCEDURE — 84443 ASSAY THYROID STIM HORMONE: CPT

## 2020-06-16 RX ORDER — LISINOPRIL AND HYDROCHLOROTHIAZIDE 20; 12.5 MG/1; MG/1
TABLET ORAL
Qty: 90 TABLET | Refills: 1 | Status: SHIPPED | OUTPATIENT
Start: 2020-06-16 | End: 2021-01-11

## 2020-06-23 ENCOUNTER — OFFICE VISIT (OUTPATIENT)
Dept: GASTROENTEROLOGY | Facility: CLINIC | Age: 70
End: 2020-06-23
Payer: MEDICARE

## 2020-06-23 ENCOUNTER — TELEPHONE (OUTPATIENT)
Dept: GASTROENTEROLOGY | Facility: CLINIC | Age: 70
End: 2020-06-23

## 2020-06-23 VITALS
SYSTOLIC BLOOD PRESSURE: 118 MMHG | DIASTOLIC BLOOD PRESSURE: 69 MMHG | WEIGHT: 142.38 LBS | HEART RATE: 82 BPM | HEIGHT: 58 IN | BODY MASS INDEX: 29.89 KG/M2

## 2020-06-23 DIAGNOSIS — Z80.0 FAMILY HISTORY OF COLON CANCER: Primary | ICD-10-CM

## 2020-06-23 DIAGNOSIS — Z12.11 SCREENING FOR COLON CANCER: Primary | ICD-10-CM

## 2020-06-23 DIAGNOSIS — Z80.0 FAMILY HISTORY OF COLON CANCER: ICD-10-CM

## 2020-06-23 PROCEDURE — 99203 OFFICE O/P NEW LOW 30 MIN: CPT | Performed by: NURSE PRACTITIONER

## 2020-06-23 PROCEDURE — G0463 HOSPITAL OUTPT CLINIC VISIT: HCPCS | Performed by: NURSE PRACTITIONER

## 2020-06-23 RX ORDER — POLYETHYLENE GLYCOL 3350, SODIUM CHLORIDE, SODIUM BICARBONATE, POTASSIUM CHLORIDE 420; 11.2; 5.72; 1.48 G/4L; G/4L; G/4L; G/4L
POWDER, FOR SOLUTION ORAL
Qty: 1 BOTTLE | Refills: 0 | Status: ON HOLD | OUTPATIENT
Start: 2020-06-23 | End: 2020-08-13

## 2020-06-23 NOTE — TELEPHONE ENCOUNTER
FYI: rn's   Patient has a procedure on 8/13/2020 at [facility] for Colonoscopy @0830 Am with  . As per Steve Carey recommendation ;     Anti-platelets and anti-coagulants: Xarelto (Please contact prescribing MD (Dr. Vani Guillory)   MD Ewelina Kendall

## 2020-06-23 NOTE — PATIENT INSTRUCTIONS
-Schedule colonoscopy w/ Dr. Beth Amezquita w/ MAC related to cardiac history  Dx: [facility] AT [place]   -Eligible for NE: No r/t cardiac history  -Prep: Split dose Colyte/TriLyte or equivalent  -Anti-platelets and anti-coagulants: Xarelto (Please contact prescribing MD (Dr. Erin Esparza

## 2020-07-14 ENCOUNTER — TELEPHONE (OUTPATIENT)
Dept: CARDIOLOGY | Age: 70
End: 2020-07-14

## 2020-07-14 DIAGNOSIS — E78.5 DYSLIPIDEMIA: ICD-10-CM

## 2020-07-14 DIAGNOSIS — I10 ESSENTIAL HYPERTENSION: Primary | ICD-10-CM

## 2020-07-14 DIAGNOSIS — Z95.0 PACEMAKER: ICD-10-CM

## 2020-08-06 ENCOUNTER — TELEPHONE (OUTPATIENT)
Dept: GASTROENTEROLOGY | Facility: CLINIC | Age: 70
End: 2020-08-06

## 2020-08-06 ENCOUNTER — TELEPHONE (OUTPATIENT)
Dept: CARDIOLOGY | Age: 70
End: 2020-08-06

## 2020-08-06 NOTE — TELEPHONE ENCOUNTER
Patient called in requesting the upcoming procedure information on 8/13 be faxed to Fax #[phone] Dr. Eber Phillips .  Please advise

## 2020-08-07 NOTE — TELEPHONE ENCOUNTER
Received orders from Dr. Juanito Levin \"patient may hold Xarelto for 2 days prior to endoscopy with Dr. Shena Lomas on 8/13/2020\"

## 2020-08-07 NOTE — TELEPHONE ENCOUNTER
I called patient and reviewed below Xarelto orders from Dr. Marce Lange with her and she voiced full understanding. I also sent them via [employer].

## 2020-08-10 ENCOUNTER — TELEPHONE (OUTPATIENT)
Dept: CARDIOLOGY | Age: 70
End: 2020-08-10

## 2020-08-11 ENCOUNTER — LAB ENCOUNTER (OUTPATIENT)
Dept: LAB | Facility: HOSPITAL | Age: 70
End: 2020-08-11
Attending: INTERNAL MEDICINE
Payer: MEDICARE

## 2020-08-11 DIAGNOSIS — Z01.818 PRE-OP TESTING: ICD-10-CM

## 2020-08-12 LAB — SARS-COV-2 RNA RESP QL NAA+PROBE: NOT DETECTED

## 2020-08-13 ENCOUNTER — ANESTHESIA (OUTPATIENT)
Dept: ENDOSCOPY | Facility: HOSPITAL | Age: 70
End: 2020-08-13
Payer: MEDICARE

## 2020-08-13 ENCOUNTER — HOSPITAL ENCOUNTER (OUTPATIENT)
Facility: HOSPITAL | Age: 70
Setting detail: HOSPITAL OUTPATIENT SURGERY
Discharge: HOME | End: 2020-08-13
Attending: INTERNAL MEDICINE | Admitting: INTERNAL MEDICINE
Payer: MEDICARE

## 2020-08-13 ENCOUNTER — ANESTHESIA EVENT (OUTPATIENT)
Dept: ENDOSCOPY | Facility: HOSPITAL | Age: 70
End: 2020-08-13
Payer: MEDICARE

## 2020-08-13 VITALS
BODY MASS INDEX: 28.22 KG/M2 | SYSTOLIC BLOOD PRESSURE: 146 MMHG | RESPIRATION RATE: 21 BRPM | WEIGHT: 140 LBS | DIASTOLIC BLOOD PRESSURE: 74 MMHG | TEMPERATURE: 98 F | HEART RATE: 62 BPM | HEIGHT: 59 IN | OXYGEN SATURATION: 98 %

## 2020-08-13 DIAGNOSIS — Z01.818 PRE-OP TESTING: Primary | ICD-10-CM

## 2020-08-13 DIAGNOSIS — Z80.0 FAMILY HISTORY OF COLON CANCER: ICD-10-CM

## 2020-08-13 PROCEDURE — 45380 COLONOSCOPY AND BIOPSY: CPT | Performed by: INTERNAL MEDICINE

## 2020-08-13 PROCEDURE — 0DBM8ZX EXCISION OF DESCENDING COLON, VIA NATURAL OR ARTIFICIAL OPENING ENDOSCOPIC, DIAGNOSTIC: ICD-10-PCS | Performed by: INTERNAL MEDICINE

## 2020-08-13 RX ORDER — SODIUM CHLORIDE, SODIUM LACTATE, POTASSIUM CHLORIDE, CALCIUM CHLORIDE 600; 310; 30; 20 MG/100ML; MG/100ML; MG/100ML; MG/100ML
INJECTION, SOLUTION INTRAVENOUS CONTINUOUS
Status: DISCONTINUED | OUTPATIENT
Start: 2020-08-13 | End: 2020-08-13

## 2020-08-13 RX ORDER — LIDOCAINE HYDROCHLORIDE 10 MG/ML
INJECTION, SOLUTION EPIDURAL; INFILTRATION; INTRACAUDAL; PERINEURAL AS NEEDED
Status: DISCONTINUED | OUTPATIENT
Start: 2020-08-13 | End: 2020-08-13 | Stop reason: SURG

## 2020-08-13 RX ADMIN — LIDOCAINE HYDROCHLORIDE 25 MG: 10 INJECTION, SOLUTION EPIDURAL; INFILTRATION; INTRACAUDAL; PERINEURAL at 08:37:00

## 2020-08-13 RX ADMIN — SODIUM CHLORIDE, SODIUM LACTATE, POTASSIUM CHLORIDE, CALCIUM CHLORIDE: 600; 310; 30; 20 INJECTION, SOLUTION INTRAVENOUS at 09:03:00

## 2020-08-13 RX ADMIN — SODIUM CHLORIDE, SODIUM LACTATE, POTASSIUM CHLORIDE, CALCIUM CHLORIDE: 600; 310; 30; 20 INJECTION, SOLUTION INTRAVENOUS at 08:35:00

## 2020-08-13 NOTE — ANESTHESIA PREPROCEDURE EVALUATION
Anesthesia PreOp Note    HPI:     Jillian Marcum is a 65year old female who presents for preoperative consultation requested by: Garrett Salazar MD    Date of Surgery: 8/13/2020    Procedure(s):  COLONOSCOPY  Indication: Family history of colon cancer Rivaroxaban (XARELTO) 20 MG Oral Tab, Take 1 tablet (20 mg total) by mouth daily with food. , Disp: 30 tablet, Rfl: 0, Taking  Echinacea-Goldenseal (ECHINACEA COMB/SAINI SEAL OR), , Disp: , Rfl: , Taking  LEVOTHYROXINE SODIUM 75 MCG Oral Tab, TAKE 1 TABLET Socioeconomic History      Marital status:       Spouse name: Not on file      Number of children: Not on file      Years of education: Not on file      Highest education level: Not on file    Occupational History      Not on file    Social Needs Component Value Date    WBC 4.3 06/16/2020    RBC 4.43 06/16/2020    HGB 12.1 06/16/2020    HCT 36.4 06/16/2020    MCV 82.2 06/16/2020    MCH 27.3 06/16/2020    MCHC 33.2 06/16/2020    RDW 13.1 06/16/2020    .0 06/16/2020             Vital Signs:   B

## 2020-08-13 NOTE — OPERATIVE REPORT
COLONOSCOPY REPORT    Ana Varghese     1950 Age 65year old   PCP Richelle Spence MD Endoscopist Olivia Zamora MD     Date of procedure: 20    Procedure: Colonoscopy w/cold biopsy polypectomy    Pre-operative diagnosis: re-screening, high the rectum revealed hemorrhoids. 5. The colonic mucosa throughout the colon showed normal vascular pattern, without evidence of angioectasias or inflammation. 6. GISELLE: normal rectal tone, no masses palpated. Recommend:  · Await pathology.  The int

## 2020-08-13 NOTE — H&P
Pre Procedure History & Physical Examination    Patient Name: Remigio Newman  MRN: [id number]  CSN: [id number]  YOB: 1950    Diagnosis: high risk screening    LISINOPRIL-HYDROCHLOROTHIAZIDE 20-12.5 MG Oral Tab, TAKE 1 TABLET BY MOUTH EVERY PF (XYLOCAINE) 1% injection, , Intravenous, PRN  propofol (DIPRIVAN) injection, , Intravenous, PRN  propofol (DIPRIVAN) infusion, , Intravenous, Continuous PRN        Allergies:   Levaquin [Levofloxa*    HIVES  Vancomycin              HIVES, OTHER (SEE COM Location: Left arm)   Pulse 60   Temp 98.2 °F (36.8 °C)   Resp 16   Ht 4' 11\" (1.499 m)   Wt 140 lb (63.5 kg)   SpO2 97%   BMI 28.28 kg/m²       Gen: Patient appears comfortable and in no acute discomfort  HEENT: the sclera appears anicteric, oropharynx c

## 2020-08-13 NOTE — ANESTHESIA POSTPROCEDURE EVALUATION
Patient: Tapan Owens    Procedure Summary     Date:  08/13/20 Room / Location:  [address] [place] [address] ENDOSCOPY 05 / [address] [place] [address] ENDOSCOPY    Anesthesia Start:  8626 Anesthesia Stop:  8403    Procedure:  COLONOSCOPY (N/A ) Diagnosis:       Family history of colon cancer      (

## 2020-08-17 ENCOUNTER — TELEPHONE (OUTPATIENT)
Dept: GASTROENTEROLOGY | Facility: CLINIC | Age: 70
End: 2020-08-17

## 2020-08-17 NOTE — TELEPHONE ENCOUNTER
----- Message from Merilyn Opitz, MD sent at 8/17/2020  3:18 PM CDT -----  Recall 5 years depending on overall health at the time

## 2020-08-17 NOTE — TELEPHONE ENCOUNTER
Entered into Epic:     Recall for _CLN___per L[place]_____in ___5 years___  Last COBV:7-  Next EEF:2-  [facility]

## 2020-08-25 RX ORDER — ROSUVASTATIN CALCIUM 10 MG/1
TABLET, COATED ORAL
Qty: 90 TABLET | Refills: 0 | Status: SHIPPED | OUTPATIENT
Start: 2020-08-25 | End: 2020-12-15

## 2020-08-25 RX ORDER — LEVOTHYROXINE SODIUM 0.07 MG/1
TABLET ORAL
Qty: 90 TABLET | Refills: 1 | Status: SHIPPED | OUTPATIENT
Start: 2020-08-25 | End: 2021-03-03

## 2020-09-12 ENCOUNTER — LAB ENCOUNTER (OUTPATIENT)
Dept: LAB | Facility: HOSPITAL | Age: 70
End: 2020-09-12
Attending: INTERNAL MEDICINE
Payer: MEDICARE

## 2020-09-12 DIAGNOSIS — Z95.0 CARDIAC PACEMAKER IN SITU: Primary | ICD-10-CM

## 2020-09-12 DIAGNOSIS — E78.00 PURE HYPERCHOLESTEROLEMIA: ICD-10-CM

## 2020-09-12 LAB
ABSOLUTE IMMATURE GRANULOCYTES (OFFPRE24): NORMAL
ABSOLUTE NRBC (AUTO): NORMAL
ALBUMIN SERPL-MCNC: 3.9 G/DL (ref 3.4–5)
ALBUMIN SERPL-MCNC: 4.1 G/DL
ALBUMIN SERPL-MCNC: 4.1 G/DL (ref 3.4–5)
ALBUMIN/GLOB SERPL: 1.2 {RATIO} (ref 1–2)
ALBUMIN/GLOB SERPL: NORMAL {RATIO}
ALP LIVER SERPL-CCNC: 71 U/L (ref 55–142)
ALP LIVER SERPL-CCNC: 72 U/L (ref 55–142)
ALP SERPL-CCNC: NORMAL U/L
ALT SERPL-CCNC: 22 U/L (ref 13–56)
ALT SERPL-CCNC: 22 U/L (ref 13–56)
ALT SERPL-CCNC: 22 UNITS/L
ANION GAP SERPL CALC-SCNC: 5 MMOL/L (ref 0–18)
ANION GAP SERPL CALC-SCNC: NORMAL MMOL/L
AST SERPL-CCNC: 13 U/L (ref 15–37)
AST SERPL-CCNC: 13 UNITS/L
AST SERPL-CCNC: 16 U/L (ref 15–37)
BASO+EOS+MONOS # BLD: NORMAL 10*3/UL
BASO+EOS+MONOS NFR BLD: NORMAL %
BASOPHILS # BLD AUTO: 0.03 X10(3) UL (ref 0–0.2)
BASOPHILS # BLD: NORMAL 10*3/UL
BASOPHILS NFR BLD AUTO: 0.6 %
BASOPHILS NFR BLD: NORMAL %
BILIRUB DIRECT SERPL-MCNC: 0.2 MG/DL (ref 0–0.2)
BILIRUB SERPL-MCNC: 0.6 MG/DL
BILIRUB SERPL-MCNC: 0.6 MG/DL (ref 0.1–2)
BILIRUB SERPL-MCNC: 0.6 MG/DL (ref 0.1–2)
BUN BLD-MCNC: 15 MG/DL (ref 7–18)
BUN SERPL-MCNC: 15 MG/DL
BUN/CREAT SERPL: 13.9 (ref 10–20)
BUN/CREAT SERPL: NORMAL
CALCIUM BLD-MCNC: 9.4 MG/DL (ref 8.5–10.1)
CALCIUM SERPL-MCNC: 9.4 MG/DL
CALCIUM, CORRECTED: NORMAL
CHLORIDE SERPL-SCNC: 104 MMOL/L
CHLORIDE SERPL-SCNC: 104 MMOL/L (ref 98–112)
CHOLEST SERPL-MCNC: 161 MG/DL
CHOLEST SMN-MCNC: 161 MG/DL (ref ?–200)
CO2 SERPL-SCNC: 31 MMOL/L (ref 21–32)
CO2 SERPL-SCNC: NORMAL MMOL/L
CREAT BLD-MCNC: 1.08 MG/DL (ref 0.55–1.02)
CREAT SERPL-MCNC: 1.08 MG/DL
DEPRECATED RDW RBC AUTO: 41.9 FL (ref 35.1–46.3)
DIFFERENTIAL METHOD BLD: NORMAL
EOSINOPHIL # BLD AUTO: 0.11 X10(3) UL (ref 0–0.7)
EOSINOPHIL # BLD: NORMAL 10*3/UL
EOSINOPHIL NFR BLD AUTO: 2.2 %
EOSINOPHIL NFR BLD: NORMAL %
ERYTHROCYTE [DISTWIDTH] IN BLOOD BY AUTOMATED COUNT: 13.9 % (ref 11–15)
ERYTHROCYTE [DISTWIDTH] IN BLOOD BY AUTOMATED COUNT: NORMAL %
ERYTHROCYTE [DISTWIDTH] IN BLOOD: NORMAL %
GLOBULIN PLAS-MCNC: 3.5 G/DL (ref 2.8–4.4)
GLOBULIN SER-MCNC: 3.5 G/DL
GLUCOSE BLD-MCNC: 83 MG/DL (ref 70–99)
GLUCOSE SERPL-MCNC: 83 MG/DL
HCT CALC (HGB X3) (OFFPRE23): NORMAL
HCT VFR BLD AUTO: 39.4 % (ref 35–48)
HCT VFR BLD CALC: 39.4 %
HDLC SERPL-MCNC: 56 MG/DL
HDLC SERPL-MCNC: 56 MG/DL (ref 40–59)
HGB BLD-MCNC: 12.9 G/DL
HGB BLD-MCNC: 12.9 G/DL (ref 12–16)
IMM GRANULOCYTES # BLD AUTO: 0.01 X10(3) UL (ref 0–1)
IMM GRANULOCYTES NFR BLD: 0.2 %
IMMATURE GRANULOCYTES (OFFPRE25): NORMAL
LDLC SERPL CALC-MCNC: 86 MG/DL
LDLC SERPL CALC-MCNC: 86 MG/DL (ref ?–100)
LENGTH OF FAST TIME PATIENT: NORMAL H
LYMPHOCYTES # BLD AUTO: 1.21 X10(3) UL (ref 1–4)
LYMPHOCYTES # BLD: NORMAL 10*3/UL
LYMPHOCYTES NFR BLD AUTO: 23.7 %
LYMPHOCYTES NFR BLD: NORMAL %
M PROTEIN MFR SERPL ELPH: 7.6 G/DL (ref 6.4–8.2)
M PROTEIN MFR SERPL ELPH: 7.7 G/DL (ref 6.4–8.2)
MCH RBC QN AUTO: 27.1 PG
MCH RBC QN AUTO: 27.1 PG (ref 26–34)
MCHC RBC AUTO-ENTMCNC: 32.7 G/DL
MCHC RBC AUTO-ENTMCNC: 32.7 G/DL (ref 31–37)
MCV RBC AUTO: 82.8 FL
MCV RBC AUTO: 82.8 FL (ref 80–100)
MONOCYTES # BLD AUTO: 0.49 X10(3) UL (ref 0.1–1)
MONOCYTES # BLD: NORMAL 10*3/UL
MONOCYTES NFR BLD AUTO: 9.6 %
MONOCYTES NFR BLD: NORMAL %
MPV (OFFPRE2): NORMAL
NEUTROPHILS # BLD AUTO: 3.25 X10 (3) UL (ref 1.5–7.7)
NEUTROPHILS # BLD AUTO: 3.25 X10(3) UL (ref 1.5–7.7)
NEUTROPHILS # BLD: NORMAL 10*3/UL
NEUTROPHILS NFR BLD AUTO: 63.7 %
NEUTROPHILS NFR BLD: NORMAL %
NONHDLC SERPL-MCNC: 105 MG/DL
NONHDLC SERPL-MCNC: 105 MG/DL (ref ?–130)
NRBC BLD MANUAL-RTO: NORMAL %
OSMOLALITY SERPL CALC.SUM OF ELEC: 290 MOSM/KG (ref 275–295)
PATIENT FASTING Y/N/NP: YES
PATIENT FASTING Y/N/NP: YES
PLAT MORPH BLD: NORMAL
PLATELET # BLD AUTO: 213 10(3)UL (ref 150–450)
PLATELET # BLD: 213 K/MCL
POTASSIUM SERPL-SCNC: 3.4 MMOL/L
POTASSIUM SERPL-SCNC: 3.4 MMOL/L (ref 3.5–5.1)
PROT SERPL-MCNC: 7.6 G/DL
RBC # BLD AUTO: 4.76 X10(6)UL (ref 3.8–5.3)
RBC # BLD: 4.76 10*6/UL
RBC MORPH BLD: NORMAL
SODIUM SERPL-SCNC: 140 MMOL/L
SODIUM SERPL-SCNC: 140 MMOL/L (ref 136–145)
TRIGL SERPL-MCNC: 93 MG/DL
TRIGL SERPL-MCNC: 93 MG/DL (ref 30–149)
VLDLC SERPL CALC-MCNC: 19 MG/DL
VLDLC SERPL CALC-MCNC: 19 MG/DL (ref 0–30)
WBC # BLD AUTO: 5.1 X10(3) UL (ref 4–11)
WBC # BLD: 5.1 K/MCL
WBC MORPH BLD: NORMAL

## 2020-09-12 PROCEDURE — 82248 BILIRUBIN DIRECT: CPT

## 2020-09-12 PROCEDURE — 80053 COMPREHEN METABOLIC PANEL: CPT

## 2020-09-12 PROCEDURE — 36415 COLL VENOUS BLD VENIPUNCTURE: CPT

## 2020-09-12 PROCEDURE — 80061 LIPID PANEL: CPT

## 2020-09-12 PROCEDURE — 85025 COMPLETE CBC W/AUTO DIFF WBC: CPT

## 2020-09-14 ENCOUNTER — CLINICAL ABSTRACT (OUTPATIENT)
Dept: CARDIOLOGY | Age: 70
End: 2020-09-14

## 2020-09-16 ENCOUNTER — ANCILLARY PROCEDURE (OUTPATIENT)
Dept: CARDIOLOGY | Age: 70
End: 2020-09-16
Attending: INTERNAL MEDICINE

## 2020-09-16 DIAGNOSIS — Z95.0 CARDIAC PACEMAKER: ICD-10-CM

## 2020-09-16 PROCEDURE — 93294 REM INTERROG EVL PM/LDLS PM: CPT | Performed by: INTERNAL MEDICINE

## 2020-09-17 ENCOUNTER — TELEPHONE (OUTPATIENT)
Dept: NEPHROLOGY | Facility: CLINIC | Age: 70
End: 2020-09-17

## 2020-09-17 DIAGNOSIS — N28.9 ABNORMAL KIDNEY FUNCTION: Primary | ICD-10-CM

## 2020-09-17 NOTE — TELEPHONE ENCOUNTER
Cholesterol is better. Potassium borderline low. Increase potassium content in diet. Kidney function though is now borderline abnormal.  Make sure she is drinking plenty of fluids. Avoid nonsteroidals. Repeat renal panel in 1 month.

## 2020-09-23 NOTE — TELEPHONE ENCOUNTER
Spoke with patient and she verbalizes understanding of result note. Reviewed high potassium foods, NSAIDs and increasing fluid intake. Renal panel ordered and patient aware to have repeated in 1 month.

## 2020-10-14 RX ORDER — SUMATRIPTAN 50 MG/1
50 TABLET, FILM COATED ORAL EVERY 2 HOUR PRN
Qty: 9 TABLET | Refills: 2 | Status: SHIPPED | OUTPATIENT
Start: 2020-10-14

## 2020-10-14 NOTE — TELEPHONE ENCOUNTER
Last seen 6/2/2020. Return to clinic in 6 months (12/2020) Refill(s) pended and routed to Dr. Royal Zepeda.

## 2020-10-27 ENCOUNTER — TELEPHONE (OUTPATIENT)
Dept: NEPHROLOGY | Facility: CLINIC | Age: 70
End: 2020-10-27

## 2020-10-27 ENCOUNTER — LAB ENCOUNTER (OUTPATIENT)
Dept: LAB | Facility: HOSPITAL | Age: 70
End: 2020-10-27
Attending: INTERNAL MEDICINE
Payer: MEDICARE

## 2020-10-27 DIAGNOSIS — N28.9 ABNORMAL KIDNEY FUNCTION: Primary | ICD-10-CM

## 2020-10-27 DIAGNOSIS — N28.9 ABNORMAL KIDNEY FUNCTION: ICD-10-CM

## 2020-10-27 PROCEDURE — 80069 RENAL FUNCTION PANEL: CPT

## 2020-10-27 PROCEDURE — 36415 COLL VENOUS BLD VENIPUNCTURE: CPT

## 2020-10-27 NOTE — TELEPHONE ENCOUNTER
Kidney function is still getting worse. Repeat a urinalysis and do an ultrasound of the kidney and then see me for follow-up. Make sure she is drinking plenty of fluids. Avoid nonsteroidals. Bring in blood pressure readings.

## 2020-10-30 ENCOUNTER — HOSPITAL ENCOUNTER (OUTPATIENT)
Dept: ULTRASOUND IMAGING | Facility: HOSPITAL | Age: 70
Discharge: HOME | End: 2020-10-30
Attending: INTERNAL MEDICINE
Payer: MEDICARE

## 2020-10-30 DIAGNOSIS — N28.9 ABNORMAL KIDNEY FUNCTION: ICD-10-CM

## 2020-10-30 PROCEDURE — 76770 US EXAM ABDO BACK WALL COMP: CPT | Performed by: INTERNAL MEDICINE

## 2020-11-01 ENCOUNTER — TELEPHONE (OUTPATIENT)
Dept: NEPHROLOGY | Facility: CLINIC | Age: 70
End: 2020-11-01

## 2020-11-01 NOTE — TELEPHONE ENCOUNTER
The ultrasound of the kidneys shows significant right-sided hydronephrosis. This means that she is not getting the urine out of her right kidney properly. It also looks as though she is retaining a fair amount of urine in her bladder.   If she is having r

## 2020-11-02 NOTE — TELEPHONE ENCOUNTER
Spoke to patient and relayed Dr. Lynnette Lisa message to go to ER now to be evaluated. Patient states she probably won't do this but if symptoms get worse she should go to ER.  She is going to call Urology but again advised her that they probably won't be able

## 2020-11-02 NOTE — TELEPHONE ENCOUNTER
Dax RN received call from the ultrasound department regarding patient US kidney results. Spoke with Tatum Wright MD message below. Patient is having right flank pain and not able to urinate.  I advised patient she should go to the emergency room r

## 2020-11-04 ENCOUNTER — TELEPHONE (OUTPATIENT)
Dept: NEPHROLOGY | Facility: CLINIC | Age: 70
End: 2020-11-04

## 2020-11-04 ENCOUNTER — HOSPITAL ENCOUNTER (EMERGENCY)
Facility: HOSPITAL | Age: 70
Discharge: HOME | End: 2020-11-04
Attending: EMERGENCY MEDICINE
Payer: MEDICARE

## 2020-11-04 VITALS
TEMPERATURE: 98 F | OXYGEN SATURATION: 98 % | WEIGHT: 145 LBS | HEART RATE: 78 BPM | BODY MASS INDEX: 29.23 KG/M2 | HEIGHT: 59 IN | SYSTOLIC BLOOD PRESSURE: 137 MMHG | DIASTOLIC BLOOD PRESSURE: 80 MMHG | RESPIRATION RATE: 18 BRPM

## 2020-11-04 DIAGNOSIS — R33.9 URINARY RETENTION: Primary | ICD-10-CM

## 2020-11-04 LAB
ANION GAP SERPL CALC-SCNC: 6 MMOL/L
BUN SERPL-MCNC: 22 MG/DL
BUN/CREAT SERPL: 21
CALCIUM SERPL-MCNC: 9.5 MG/DL
CHLORIDE SERPL-SCNC: 106 MMOL/L
CO2 SERPL-SCNC: 28 MMOL/L
CREAT SERPL-MCNC: 1.05 MG/DL
GLUCOSE SERPL-MCNC: 82 MG/DL
POTASSIUM SERPL-SCNC: 3.6 MMOL/L
SODIUM SERPL-SCNC: 140 MMOL/L

## 2020-11-04 PROCEDURE — 99283 EMERGENCY DEPT VISIT LOW MDM: CPT

## 2020-11-04 PROCEDURE — 80048 BASIC METABOLIC PNL TOTAL CA: CPT | Performed by: EMERGENCY MEDICINE

## 2020-11-04 PROCEDURE — 81001 URINALYSIS AUTO W/SCOPE: CPT | Performed by: EMERGENCY MEDICINE

## 2020-11-04 PROCEDURE — 51702 INSERT TEMP BLADDER CATH: CPT

## 2020-11-04 PROCEDURE — 36415 COLL VENOUS BLD VENIPUNCTURE: CPT

## 2020-11-04 NOTE — ED INITIAL ASSESSMENT (HPI)
Pt had US on Friday d/t slow urination, was told by doctor to come into ED for urinary retention and \"something with my kidney\".  Pt also reports chronic back pain, unsure if related, but sts she frequently carries her grandchildren and thinks it may be d

## 2020-11-05 ENCOUNTER — OFFICE VISIT (OUTPATIENT)
Dept: SURGERY | Facility: CLINIC | Age: 70
End: 2020-11-05
Payer: MEDICARE

## 2020-11-05 VITALS
WEIGHT: 142 LBS | HEIGHT: 59 IN | BODY MASS INDEX: 28.63 KG/M2 | HEART RATE: 62 BPM | SYSTOLIC BLOOD PRESSURE: 121 MMHG | DIASTOLIC BLOOD PRESSURE: 70 MMHG

## 2020-11-05 DIAGNOSIS — R10.9 ABDOMINAL PAIN, UNSPECIFIED ABDOMINAL LOCATION: ICD-10-CM

## 2020-11-05 DIAGNOSIS — R35.1 NOCTURIA: ICD-10-CM

## 2020-11-05 DIAGNOSIS — N13.30 HYDRONEPHROSIS, UNSPECIFIED HYDRONEPHROSIS TYPE: Primary | ICD-10-CM

## 2020-11-05 DIAGNOSIS — Z79.01 ON RIVAROXABAN THERAPY: ICD-10-CM

## 2020-11-05 DIAGNOSIS — N39.46 MIXED STRESS AND URGE URINARY INCONTINENCE: ICD-10-CM

## 2020-11-05 DIAGNOSIS — R31.0 GROSS HEMATURIA: ICD-10-CM

## 2020-11-05 DIAGNOSIS — R33.9 URINARY RETENTION: ICD-10-CM

## 2020-11-05 DIAGNOSIS — N19 RENAL FAILURE, UNSPECIFIED CHRONICITY: ICD-10-CM

## 2020-11-05 PROCEDURE — 99205 OFFICE O/P NEW HI 60 MIN: CPT | Performed by: UROLOGY

## 2020-11-05 PROCEDURE — 51725 SIMPLE CYSTOMETROGRAM: CPT | Performed by: UROLOGY

## 2020-11-05 PROCEDURE — G0463 HOSPITAL OUTPT CLINIC VISIT: HCPCS | Performed by: UROLOGY

## 2020-11-05 NOTE — TELEPHONE ENCOUNTER
Dr. Lorraine Lucero, patient asking if she needs to see you in office tomorrow as scheduled? Patient went to ED as recommended last night and they placed colon catheter. She has appt with Dr. Leena Curran today (11/5).

## 2020-11-05 NOTE — PATIENT INSTRUCTIONS
Ganesh Trammell M.D.    1.   Your medicine Lasix renogram--to prove whether or not the right kidney is obstructed/blocked. If it were to be obstructed, this would require further investigation    2.      After the test

## 2020-11-05 NOTE — PROGRESS NOTES
Diamante Webb is a 65year old female. Reason for Consultation:       History provided by patient. Referred by [address] [place] [address] ER.        History of Present Illness:       Urinary Retention   Nephrologist, Dr. Lalit Lawrence, ordered Kidney US due to mildly impaired kid also denies having or feeling a cystocele. She states she does wears pads 1x daily to stay dry. Patient presently denies any dysuria. She is not currently taking any medication for this.      Anticoagulated   Patient is currently taking Xarelto 20 mg daily History    Tobacco Use      Smoking status: Never Smoker      Smokeless tobacco: Never Used    Alcohol use: Yes      Comment: social    Drug use: No       Medications (Active prior to today's visit):  Current Outpatient Medications   Medication Sig Dispens for abdominal pain. Neurological:  Negative for gait disturbance. Positive for headache. Endocrine:  Negative for abnormal sleep patterns, increased activity, polydipsia and polyphagia. Positive for fatigue.      Allergic/Immuno:  Negative for envir Mucosa of the vagina  Pale   Urethrocele  None   Cystocele    None   Rectocele    None   Hypermobility of bladder neck  None   Lawson catheter exiting out of urethra   Uterus and cervix   Unremarkable   Adnexa  Unremarkable   Perineum and anus unremarkabl procedure, the end of the Lawson catheter and the inserting end of the Lawson bag  were treated with alcohol swabs and reattached. I explained the findings to the patient.   THE FINDINGS  =   Patient felt 1st sensation at 130 cc   1st desire to void at 200 c understands and agrees.    (R31.0) Gross hematuria  Problem started 11/05/2020 in the morning; secondary to 11/04/2020 colon catheter insertion for urinary retention.  Patient states the urine in her catheter is currently light pink in color; her urine is d incontinence, but urge is worse than stress. The patient is not currently taking any medication for this. She wears 1 pad daily to stay dry.  Patient feels this is stable and chooses to continue pad use.     (R35.1) Nocturia   Patient complains of nocturia This Visit:  Requested Prescriptions      No prescriptions requested or ordered in this encounter       Imaging & Referrals:  [facility] RENAL WITH LASIX  (CPT=[id number])     By signing my name below, I, Burr Sacks,  attest that this documentation has been prepared un

## 2020-11-05 NOTE — CM/SW NOTE
Patient calling [facility]. stating she was seen in ER yesterday and the soonest available Dr. Blackman Crew appointment she could get was 11/12 - patient states she is having more pain than she has ever had in the past since the colon catheter was placed and since plac

## 2020-11-05 NOTE — ED PROVIDER NOTES
Patient Seen in: [facility] AND [facility] Emergency Department    History   Patient presents with:  Urinary Retention    Stated Complaint: urinary retention     HPI    68-year-old female with past medical history of atrial fibrillation/pulmonary embolism on Xarel with food. Echinacea-Goldenseal (ECHINACEA COMB/SAINI SEAL OR),     Hydrocortisone Acetate (ANUSOL-HC) 25 MG Rectal Suppos,  Place 1 suppository (25 mg total) rectally 2 (two) times daily.    Fexofenadine-Pseudoephed ER (ALLEGRA-D ALLERGY & CONGESTION) 1 MMM.  Head: Normocephalic. Eyes: No injection. Cardiovascular: RRR. Pulmonary/Chest: Effort normal. CTAB. Abdominal: Soft. Nontender, no CVA/flank tenderness. Musculoskeletal: No gross deformity. Neurological: Alert. Skin: Skin is warm.    Psychi Radha Murillo MD ON 10/30/2020 AT 4:07 PM     FINALIZED BY (CST): Doc Skiff, MD ON 10/30/2020 AT 4:18 PM                  MDM     DIFFERENTIAL DIAGNOSIS: After history and physical exam differential diagnosis includes but is not limited to urinary rete Medication List as of 11/4/2020  8:01 PM

## 2020-11-05 NOTE — ED NOTES
Lawson bag replaced with leg bag for home and instructions provided on catheter care. Reviewed discharge information with patient. Patient verbalized understanding, no further questions or complaints at this time.  Patient is alert and orientated x4, in no a

## 2020-11-05 NOTE — TELEPHONE ENCOUNTER
Spoke with patient and she will cancel tomorrow's appt with Dr. Emma Marte if Dr. Candis Mondragon doesn't feel it's necessary. She will reschedule for December with Dr. Emma Marte.

## 2020-11-05 NOTE — TELEPHONE ENCOUNTER
Okay to see just urology unless Dr. Candis Mondragon feels otherwise. Tell her though that she would be due for routine follow-up in December.

## 2020-11-05 NOTE — ED NOTES
Assumed care of patient at 1900 from [facility], [facility]. Patient resting on cart, colon in place, all needs met, updated on plan of care at this time- awaiting labs.

## 2020-11-06 ENCOUNTER — HOSPITAL ENCOUNTER (OUTPATIENT)
Dept: NUCLEAR MEDICINE | Facility: HOSPITAL | Age: 70
Discharge: HOME | End: 2020-11-06
Attending: UROLOGY
Payer: MEDICARE

## 2020-11-06 ENCOUNTER — NURSE ONLY (OUTPATIENT)
Dept: SURGERY | Facility: CLINIC | Age: 70
End: 2020-11-06
Payer: MEDICARE

## 2020-11-06 ENCOUNTER — TELEPHONE (OUTPATIENT)
Dept: SURGERY | Facility: CLINIC | Age: 70
End: 2020-11-06

## 2020-11-06 DIAGNOSIS — N13.30 HYDRONEPHROSIS, UNSPECIFIED HYDRONEPHROSIS TYPE: ICD-10-CM

## 2020-11-06 DIAGNOSIS — R33.9 URINARY RETENTION: Primary | ICD-10-CM

## 2020-11-06 DIAGNOSIS — R33.9 URINARY RETENTION: ICD-10-CM

## 2020-11-06 PROCEDURE — 51702 INSERT TEMP BLADDER CATH: CPT | Performed by: UROLOGY

## 2020-11-06 PROCEDURE — 78708 K FLOW/FUNCT IMAGE W/DRUG: CPT | Performed by: UROLOGY

## 2020-11-06 RX ORDER — FUROSEMIDE 10 MG/ML
INJECTION INTRAMUSCULAR; INTRAVENOUS
Status: COMPLETED
Start: 2020-11-06 | End: 2020-11-06

## 2020-11-06 RX ORDER — FUROSEMIDE 10 MG/ML
40 INJECTION INTRAMUSCULAR; INTRAVENOUS ONCE
Status: COMPLETED | OUTPATIENT
Start: 2020-11-06 | End: 2020-11-06

## 2020-11-06 RX ADMIN — FUROSEMIDE 40 MG: 10 INJECTION INTRAMUSCULAR; INTRAVENOUS at 08:15:00

## 2020-11-06 NOTE — PROGRESS NOTES
I called the pt into the exam room and introduced myself and verified her last name and . [facility]ing [facility] /decath. I explained the procedure and pt agreed to proceed.  I then assisted the pt onto the exam table

## 2020-11-06 NOTE — PROGRESS NOTES
Pt returned to clinic @ 2:30 p.m. She reports not being able to void since she left the clinic earlier today. A bladder scan was performed & indicates 1050 ml.  As ordered by PVK, the pt was prepped via sterile technique & a cooln catheter placed; drained

## 2020-11-06 NOTE — IMAGING NOTE
0810 PT IN NUCLEAR MED FOR RENAL SCAN, ALLERGIES REVIEW WITH PT , NAME AND  REVIEWED,   0815 LASIX 40 MG IVP GIVEN SLOWLY OVER 2 MINUTES AND FLUSHED WITH SALINE. PT TOLERATED WELL.    LASIX LOT #[id number] EXP

## 2020-11-06 NOTE — TELEPHONE ENCOUNTER
----- Message from Mikael Brandt MD sent at 11/6/2020  2:59 PM CST -----  Urology nurses,   Please notify patient that 11/6/2020 Lasix renogram shows that the outflow of urine from the right kidney is blocked to a significant degree, but it does not ex

## 2020-11-06 NOTE — TELEPHONE ENCOUNTER
Patient is in office now. Will notified her of these results. Will give her a copy of CT urogram order.

## 2020-11-06 NOTE — PROGRESS NOTES
Additional info: PVK gave verbal orders for pt to perform CIC, indefinitely,  4-5 times daily, if retained urine amounts are greater than 500 ml consistently then increase to 6 times daily, using a 14 FR straight tipped catheter.

## 2020-11-09 ENCOUNTER — HOSPITAL ENCOUNTER (OUTPATIENT)
Dept: CT IMAGING | Facility: HOSPITAL | Age: 70
Discharge: HOME | End: 2020-11-09
Attending: UROLOGY
Payer: MEDICARE

## 2020-11-09 ENCOUNTER — TELEPHONE (OUTPATIENT)
Dept: SURGERY | Facility: CLINIC | Age: 70
End: 2020-11-09

## 2020-11-09 DIAGNOSIS — R31.0 GROSS HEMATURIA: ICD-10-CM

## 2020-11-09 DIAGNOSIS — N30.00 ACUTE CYSTITIS WITHOUT HEMATURIA: ICD-10-CM

## 2020-11-09 DIAGNOSIS — N13.30 HYDRONEPHROSIS OF RIGHT KIDNEY: ICD-10-CM

## 2020-11-09 PROCEDURE — 82565 ASSAY OF CREATININE: CPT

## 2020-11-09 PROCEDURE — 76377 3D RENDER W/INTRP POSTPROCES: CPT

## 2020-11-09 PROCEDURE — 74178 CT ABD&PLV WO CNTR FLWD CNTR: CPT | Performed by: UROLOGY

## 2020-11-10 NOTE — TELEPHONE ENCOUNTER
Late entry from 11/6. I faxed the [facility] caths script to [address] [place] [address] and received a fax confirmation. I sent the script to scanning.

## 2020-11-10 NOTE — TELEPHONE ENCOUNTER
S/W pt and gave her an appt for Monday 11/30 at 10:40 am to discuss CT  Results.  I asked pt how it was going with the CIC and if she was able to cath 4 times daily and pt stated that she is not really doing the cath 4 times a day and has only done it 2 ortiz

## 2020-11-11 DIAGNOSIS — R93.429 ABNORMAL CT SCAN, KIDNEY: Primary | ICD-10-CM

## 2020-11-11 DIAGNOSIS — N13.70 VESICOURETERAL REFLUX: ICD-10-CM

## 2020-11-11 DIAGNOSIS — N13.30 HYDRONEPHROSIS, UNSPECIFIED HYDRONEPHROSIS TYPE: ICD-10-CM

## 2020-11-12 ENCOUNTER — PATIENT MESSAGE (OUTPATIENT)
Dept: SURGERY | Facility: CLINIC | Age: 70
End: 2020-11-12

## 2020-11-12 ENCOUNTER — TELEPHONE (OUTPATIENT)
Dept: SURGERY | Facility: CLINIC | Age: 70
End: 2020-11-12

## 2020-11-16 RX ORDER — FEXOFENADINE HCL AND PSEUDOEPHEDRINE HCI 180; 240 MG/1; MG/1
TABLET, EXTENDED RELEASE ORAL
Qty: 30 TABLET | Refills: 3 | Status: SHIPPED | OUTPATIENT
Start: 2020-11-16 | End: 2021-10-18

## 2020-11-16 NOTE — TELEPHONE ENCOUNTER
From: Crescencio Nuno  To: Norma Arshad MD  Sent: 11/12/2020 2:10 PM CST  Subject: Test Results Question    I don't understand the test results. Do I have a blockage? Is it a concern? What is treatment? Update: I cannot self urinate.  Catheter is my

## 2020-11-16 NOTE — TELEPHONE ENCOUNTER
Last seen 6/2/2020. Return to clinic in 6 months (12/2020) No follow up scheduled. Refill(s) pended and routed to Dr. Karen Landeros.

## 2020-11-20 ENCOUNTER — TELEPHONE (OUTPATIENT)
Dept: SURGERY | Facility: CLINIC | Age: 70
End: 2020-11-20

## 2020-11-20 NOTE — TELEPHONE ENCOUNTER
----- Message from Erma Shafer MD sent at 11/11/2020 11:02 PM CST -----  Urology nurses, please call patient---  11/10/2020 CT urogram shows persisting dilation of the right kidney and right ureter with no obvious stones or cancers; radiologist think

## 2020-11-20 NOTE — TELEPHONE ENCOUNTER
This RN called pt & read message from Pito Gruber as stated below. She confirms appt 11/21/2020 in Radiology for cystogram XR. Pt will complete UA & cytology prior to 11/30/20 ZEINAB.   She also reports that \"since I've been self catheterizing, I need to take [facility]

## 2020-11-21 ENCOUNTER — HOSPITAL ENCOUNTER (OUTPATIENT)
Dept: GENERAL RADIOLOGY | Facility: HOSPITAL | Age: 70
Discharge: HOME | End: 2020-11-21
Attending: UROLOGY
Payer: MEDICARE

## 2020-11-21 DIAGNOSIS — N13.70 VESICOURETERAL REFLUX: ICD-10-CM

## 2020-11-21 DIAGNOSIS — R93.429 ABNORMAL CT SCAN, KIDNEY: ICD-10-CM

## 2020-11-21 DIAGNOSIS — N13.30 HYDRONEPHROSIS, UNSPECIFIED HYDRONEPHROSIS TYPE: ICD-10-CM

## 2020-11-21 PROCEDURE — 74430 CONTRAST X-RAY BLADDER: CPT | Performed by: UROLOGY

## 2020-11-21 PROCEDURE — 87086 URINE CULTURE/COLONY COUNT: CPT | Performed by: UROLOGY

## 2020-11-21 PROCEDURE — 51600 INJECTION FOR BLADDER X-RAY: CPT | Performed by: UROLOGY

## 2020-11-21 PROCEDURE — 81001 URINALYSIS AUTO W/SCOPE: CPT

## 2020-11-22 DIAGNOSIS — N30.00 ACUTE CYSTITIS WITHOUT HEMATURIA: Primary | ICD-10-CM

## 2020-11-22 PROCEDURE — 87086 URINE CULTURE/COLONY COUNT: CPT | Performed by: UROLOGY

## 2020-11-30 ENCOUNTER — OFFICE VISIT (OUTPATIENT)
Dept: SURGERY | Facility: CLINIC | Age: 70
End: 2020-11-30
Payer: MEDICARE

## 2020-11-30 ENCOUNTER — TELEPHONE (OUTPATIENT)
Dept: CARDIOLOGY | Age: 70
End: 2020-11-30

## 2020-11-30 VITALS
HEART RATE: 76 BPM | HEIGHT: 59 IN | WEIGHT: 142 LBS | RESPIRATION RATE: 16 BRPM | DIASTOLIC BLOOD PRESSURE: 78 MMHG | SYSTOLIC BLOOD PRESSURE: 131 MMHG | BODY MASS INDEX: 28.63 KG/M2

## 2020-11-30 DIAGNOSIS — N13.30 HYDRONEPHROSIS, UNSPECIFIED HYDRONEPHROSIS TYPE: Primary | ICD-10-CM

## 2020-11-30 DIAGNOSIS — N19 RENAL FAILURE, UNSPECIFIED CHRONICITY: ICD-10-CM

## 2020-11-30 DIAGNOSIS — N39.3 STRESS INCONTINENCE IN FEMALE: ICD-10-CM

## 2020-11-30 DIAGNOSIS — R10.9 ABDOMINAL PAIN, UNSPECIFIED ABDOMINAL LOCATION: ICD-10-CM

## 2020-11-30 DIAGNOSIS — Z79.01 ON RIVAROXABAN THERAPY: ICD-10-CM

## 2020-11-30 DIAGNOSIS — R33.9 URINARY RETENTION: ICD-10-CM

## 2020-11-30 DIAGNOSIS — R31.0 GROSS HEMATURIA: ICD-10-CM

## 2020-11-30 DIAGNOSIS — N31.9 NEUROGENIC BLADDER: ICD-10-CM

## 2020-11-30 PROCEDURE — G0463 HOSPITAL OUTPT CLINIC VISIT: HCPCS | Performed by: UROLOGY

## 2020-11-30 PROCEDURE — 99215 OFFICE O/P EST HI 40 MIN: CPT | Performed by: UROLOGY

## 2020-11-30 NOTE — PROGRESS NOTES
Patient seen in office, scheduled Cystoscopy, right retrograde pyelogram x-ray, possible bladder biopsy, possible dilation of right ureteral stricture, probable insertion of right ureteral stent, possible right nephro ureteroscopy with either biopsy or bru

## 2020-11-30 NOTE — PROGRESS NOTES
HPI:    Patient ID: Philip Murphy is a 65year old female.     HPI     Neurogenic Bladder / Urinary Retention   Incidentially found 10/30/2020 US KIDNEYS/BLADDER (ordered by nephrologist, Dr. Haseeb Dodd, due to mildly impaired kidney function) = Postvoid bl she is currently asymptomatic.      Abdominal pain   Problem started morning of 11/05/2020; she believes this is secondary to 11/04/2020 colon catheter insertion for urinary retention.  Patient complains of intermittent aching stomach pain with associated b 3   • SUMATRIPTAN SUCCINATE 50 MG Oral Tab TAKE 1 TABLET (50 MG TOTAL) BY MOUTH EVERY 2 (TWO) HOURS AS NEEDED FOR MIGRAINE.  9 tablet 2   • ROSUVASTATIN CALCIUM 10 MG Oral Tab TAKE 1 TABLET BY MOUTH EVERY DAY AT NIGHT 90 tablet 0   • Levothyroxine Sodium 75 Family History   Problem Relation Age of Onset   • Breast Cancer Other 29   • Heart Disease Father    • Lipids Father         HYPERLIPIDEMIA   • Hypertension Father    • Cancer Mother         STOMACH,COLON   • Heart Disease Mother       Social Histo negative; Leukocyte = trace; WBC = 8; RBC = 1; Bacteria = negative   05/20/2019 Creatinine = 0.74; GFR = 82; UA blood = negative; WBC = 1; RBC = 1; Bacteria = negative; Microscopic not indicated         UROLOGICAL IMAGING   11/21/2020 XR CYSTOGRAM = Modera cc  Largest urine output per 24 hours = after first catheterization in morning ranges from 400-700 cc   Average voided volume per void = 450-500 cc     CONCLUSION of intake voiding diary =  No polydipsia; patient should continue self-catheterization 4x per general anesthesia at [facility]. I advise patient she will need to hold Xarelto 1-2 days before procedure.  While evaluating patient, I called cardiologist, Dr. Chantal Berg, but he was unavailable; therefore, I spoke with cardiology nurse, Hemal, regarding cystos unremarkable; Bladder capacity unremarkable; No evidence of neurogenic bladder.  11/09/2020 CT UROGRAM (W+WO) = Marked circumferential urinary bladder wall thickening with a small 1.8 cm right superior/lateral bladder diverticulum; Imaging appearance raises started morning of 11/05/2020; she believes this is secondary to 11/04/2020 colon catheter insertion for urinary retention. Patient complains of intermittent aching stomach pain with associated bloating. She presently denies any recurrent abdominal pain.  Maxi Johnson Encounter      Surgical Case Request      Cytology, fluids -- specimen      Meds This Visit:  Requested Prescriptions      No prescriptions requested or ordered in this encounter       Imaging & Referrals:  None     ID#[id number]    By signing my name below, I,

## 2020-11-30 NOTE — PATIENT INSTRUCTIONS
Latisha Valencia M.D.      1.  Urine specimen today for cytology--we will notify you of the results    2. Continue self-catheterization 4 times daily for your \"neurogenic bladder\" as you are doing      3.   Cysto

## 2020-12-07 RX ORDER — ROSUVASTATIN CALCIUM 10 MG/1
1 TABLET, COATED ORAL NIGHTLY
COMMUNITY
Start: 2020-08-25 | End: 2020-12-15 | Stop reason: SDUPTHER

## 2020-12-15 ENCOUNTER — OFFICE VISIT (OUTPATIENT)
Dept: CARDIOLOGY | Age: 70
End: 2020-12-15

## 2020-12-15 VITALS
WEIGHT: 137 LBS | SYSTOLIC BLOOD PRESSURE: 138 MMHG | HEIGHT: 59 IN | BODY MASS INDEX: 27.62 KG/M2 | DIASTOLIC BLOOD PRESSURE: 76 MMHG | HEART RATE: 93 BPM

## 2020-12-15 DIAGNOSIS — I48.21 PERMANENT ATRIAL FIBRILLATION (CMD): ICD-10-CM

## 2020-12-15 DIAGNOSIS — I10 ESSENTIAL HYPERTENSION: Primary | ICD-10-CM

## 2020-12-15 DIAGNOSIS — E78.5 DYSLIPIDEMIA: ICD-10-CM

## 2020-12-15 DIAGNOSIS — Z95.0 PRESENCE OF CARDIAC PACEMAKER: ICD-10-CM

## 2020-12-15 PROCEDURE — 99214 OFFICE O/P EST MOD 30 MIN: CPT | Performed by: INTERNAL MEDICINE

## 2020-12-15 RX ORDER — ROSUVASTATIN CALCIUM 10 MG/1
10 TABLET, COATED ORAL NIGHTLY
Qty: 90 TABLET | Refills: 3 | Status: SHIPPED | OUTPATIENT
Start: 2020-12-15

## 2020-12-15 RX ORDER — ROSUVASTATIN CALCIUM 10 MG/1
TABLET, COATED ORAL
Qty: 90 TABLET | Refills: 1 | Status: SHIPPED | OUTPATIENT
Start: 2020-12-15

## 2020-12-15 ASSESSMENT — PATIENT HEALTH QUESTIONNAIRE - PHQ9
CLINICAL INTERPRETATION OF PHQ2 SCORE: NO FURTHER SCREENING NEEDED
1. LITTLE INTEREST OR PLEASURE IN DOING THINGS: NOT AT ALL
SUM OF ALL RESPONSES TO PHQ9 QUESTIONS 1 AND 2: 0
SUM OF ALL RESPONSES TO PHQ9 QUESTIONS 1 AND 2: 0
CLINICAL INTERPRETATION OF PHQ9 SCORE: NO FURTHER SCREENING NEEDED
2. FEELING DOWN, DEPRESSED OR HOPELESS: NOT AT ALL

## 2020-12-19 ENCOUNTER — TELEPHONE (OUTPATIENT)
Dept: SURGERY | Facility: CLINIC | Age: 70
End: 2020-12-19

## 2020-12-19 ENCOUNTER — LAB ENCOUNTER (OUTPATIENT)
Dept: LAB | Facility: HOSPITAL | Age: 70
End: 2020-12-19
Attending: UROLOGY
Payer: MEDICARE

## 2020-12-19 DIAGNOSIS — R31.0 GROSS HEMATURIA: ICD-10-CM

## 2020-12-19 DIAGNOSIS — N30.00 ACUTE CYSTITIS WITHOUT HEMATURIA: Primary | ICD-10-CM

## 2020-12-19 DIAGNOSIS — Z01.818 PREOP TESTING: ICD-10-CM

## 2020-12-19 DIAGNOSIS — N28.9 ABNORMAL KIDNEY FUNCTION: ICD-10-CM

## 2020-12-19 PROCEDURE — 88108 CYTOPATH CONCENTRATE TECH: CPT

## 2020-12-19 PROCEDURE — 87077 CULTURE AEROBIC IDENTIFY: CPT | Performed by: UROLOGY

## 2020-12-19 PROCEDURE — 81001 URINALYSIS AUTO W/SCOPE: CPT

## 2020-12-19 PROCEDURE — 87086 URINE CULTURE/COLONY COUNT: CPT | Performed by: UROLOGY

## 2020-12-19 PROCEDURE — 87186 SC STD MICRODIL/AGAR DIL: CPT | Performed by: UROLOGY

## 2020-12-19 NOTE — TELEPHONE ENCOUNTER
Urology telephone call    Patient cystoscopic procedure at [address] [place] [address], same-day surgery on 12/22/2020. She is on self intermittent catheterization 4 times daily.   I just call her and speak to her and I request and she agrees to go to [facility] lab and se

## 2020-12-20 ENCOUNTER — TELEPHONE (OUTPATIENT)
Dept: SURGERY | Facility: CLINIC | Age: 70
End: 2020-12-20

## 2020-12-20 DIAGNOSIS — R31.29 MICROHEMATURIA: Primary | ICD-10-CM

## 2020-12-20 DIAGNOSIS — N30.00 ACUTE CYSTITIS WITHOUT HEMATURIA: ICD-10-CM

## 2020-12-20 DIAGNOSIS — N13.30 HYDRONEPHROSIS OF RIGHT KIDNEY: ICD-10-CM

## 2020-12-21 ENCOUNTER — TELEPHONE (OUTPATIENT)
Dept: SURGERY | Facility: CLINIC | Age: 70
End: 2020-12-21

## 2020-12-21 RX ORDER — CEFADROXIL 500 MG/1
CAPSULE ORAL
Qty: 14 CAPSULE | Refills: 0 | Status: SHIPPED | OUTPATIENT
Start: 2020-12-21 | End: 2020-12-29

## 2020-12-21 NOTE — TELEPHONE ENCOUNTER
Pt called stating pt spoke to Dr. Tito Pastor last night 12-20-20. Pt has not received a call back with the test results and the antibiotic.   Please call pt

## 2020-12-21 NOTE — TELEPHONE ENCOUNTER
Dr. Nehal Connors, pt calling about urine test results and whether or not she needs to be prescribed ABX. Tasked to [address], please advise.

## 2020-12-21 NOTE — TELEPHONE ENCOUNTER
Please call patient back;   she has urinary tract infection with E. coli; I am electronically sending order for cefadroxil 500 mg twice daily for 7 days.   Surgery scheduler Araidna Bacon will reschedule her procedure for the hospital.  Patient needs to do another

## 2020-12-21 NOTE — TELEPHONE ENCOUNTER
LMTCB  & read PVK message as stated below. Please transfer pt to Urology if she calls back.    -Please call patient back;   she has urinary tract infection with E. coli; I am electronically sending order for cefadroxil 500 mg twice daily for 7 days.   Surge

## 2020-12-21 NOTE — TELEPHONE ENCOUNTER
Ricky Whyte POSTPONE CASE FOR 12/22/2020 SINCE 12/19/2028 URINE CULTURE SHOWS E. COLI URINARY TRACT INFECTION, AND SEE IF PATIENT CAN BE RESCHEDULED FOR TUESDAY, DECEMBER 29;   I just called and spoke to patient this 12/20/2020 Sunday evening and notifi

## 2020-12-22 ENCOUNTER — TELEPHONE (OUTPATIENT)
Dept: SURGERY | Facility: CLINIC | Age: 70
End: 2020-12-22

## 2020-12-22 DIAGNOSIS — N13.30 HYDRONEPHROSIS, UNSPECIFIED HYDRONEPHROSIS TYPE: ICD-10-CM

## 2020-12-22 DIAGNOSIS — R31.29 MICROHEMATURIA: Primary | ICD-10-CM

## 2020-12-22 NOTE — TELEPHONE ENCOUNTER
Called patient do to a scheduling conflict informed patient that surgery will be moved to Tuesday 01/05/2021, I will send surgery change request, to reflect new date.

## 2020-12-28 ENCOUNTER — OFFICE VISIT (OUTPATIENT)
Dept: NEPHROLOGY | Facility: CLINIC | Age: 70
End: 2020-12-28
Payer: MEDICARE

## 2020-12-28 ENCOUNTER — TELEPHONE (OUTPATIENT)
Dept: NEPHROLOGY | Facility: CLINIC | Age: 70
End: 2020-12-28

## 2020-12-28 VITALS
DIASTOLIC BLOOD PRESSURE: 74 MMHG | HEIGHT: 59 IN | BODY MASS INDEX: 28.5 KG/M2 | SYSTOLIC BLOOD PRESSURE: 120 MMHG | HEART RATE: 84 BPM | WEIGHT: 141.38 LBS

## 2020-12-28 DIAGNOSIS — E78.00 PURE HYPERCHOLESTEROLEMIA: ICD-10-CM

## 2020-12-28 DIAGNOSIS — Z00.00 MEDICARE ANNUAL WELLNESS VISIT, SUBSEQUENT: Primary | ICD-10-CM

## 2020-12-28 DIAGNOSIS — N18.31 STAGE 3A CHRONIC KIDNEY DISEASE (HCC): ICD-10-CM

## 2020-12-28 DIAGNOSIS — Z12.31 ENCOUNTER FOR SCREENING MAMMOGRAM FOR BREAST CANCER: ICD-10-CM

## 2020-12-28 DIAGNOSIS — I10 ESSENTIAL HYPERTENSION: ICD-10-CM

## 2020-12-28 PROCEDURE — 99214 OFFICE O/P EST MOD 30 MIN: CPT | Performed by: INTERNAL MEDICINE

## 2020-12-28 PROCEDURE — G0463 HOSPITAL OUTPT CLINIC VISIT: HCPCS | Performed by: INTERNAL MEDICINE

## 2020-12-28 NOTE — PATIENT INSTRUCTIONS
Please do follow-up laboratory studies approximately 2 weeks after your cystoscopy. Please do mammogram as ordered.

## 2020-12-28 NOTE — PROGRESS NOTES
[facility], [facility]  Nephrology Daily Progress Note    Axel Mehta  [id number]  65year old  Patient presents with: Well Adult: Present for Medicare px. HPI:   Axel Mehta is a 65year old female.   68-year-old female with a history of hyperten Negative for dysuria and hematuria  Hema/Lymph:  Negative for easy bleeding and easy bruising  Integumentary:  Negative for pruritus and rash  Musculoskeletal:  Negative for bone/joint symptoms  Neurological:  Negative for gait disturbance  Psychiatric:  N  140  --    K 4.6 3.6  --     106  --    CO2 32.0 28.0  --    BUN 14 22*  --    CREATSERUM 1.26* 1.05*  --    CA 10.0 9.5  --    ALB 4.4  --   --    PHOS 3.9  --   --    BUNCREA 11.1 21.0*  --    ANIONGAP 3 6  --    OSMOCALC 284 292  --    GF mouth daily. , Disp: , Rfl:     Allergies:    Levaquin [Levofloxa*    HIVES  Vancomycin              HIVES, OTHER (SEE COMMENTS)         ASSESSMENT/PLAN:   Assessment   Medicare annual wellness visit, subsequent  (primary encounter diagnosis)  Essential hyp

## 2020-12-29 RX ORDER — SODIUM CHLORIDE, SODIUM LACTATE, POTASSIUM CHLORIDE, CALCIUM CHLORIDE 600; 310; 30; 20 MG/100ML; MG/100ML; MG/100ML; MG/100ML
INJECTION, SOLUTION INTRAVENOUS CONTINUOUS
Status: DISCONTINUED | OUTPATIENT
Start: 2020-12-29 | End: 2020-12-29

## 2021-01-02 ENCOUNTER — LAB ENCOUNTER (OUTPATIENT)
Dept: LAB | Facility: HOSPITAL | Age: 71
End: 2021-01-02
Attending: UROLOGY
Payer: MEDICARE

## 2021-01-02 DIAGNOSIS — Z01.818 PREOP TESTING: ICD-10-CM

## 2021-01-02 DIAGNOSIS — N30.00 ACUTE CYSTITIS WITHOUT HEMATURIA: ICD-10-CM

## 2021-01-02 PROCEDURE — 87077 CULTURE AEROBIC IDENTIFY: CPT

## 2021-01-02 PROCEDURE — 87086 URINE CULTURE/COLONY COUNT: CPT

## 2021-01-02 PROCEDURE — 87186 SC STD MICRODIL/AGAR DIL: CPT

## 2021-01-03 LAB — SARS-COV-2 RNA RESP QL NAA+PROBE: NOT DETECTED

## 2021-01-03 RX ORDER — SULFAMETHOXAZOLE AND TRIMETHOPRIM 800; 160 MG/1; MG/1
1 TABLET ORAL 2 TIMES DAILY
Qty: 20 TABLET | Refills: 0 | Status: SHIPPED | OUTPATIENT
Start: 2021-01-03 | End: 2021-01-13

## 2021-01-04 ENCOUNTER — ANCILLARY PROCEDURE (OUTPATIENT)
Dept: CARDIOLOGY | Age: 71
End: 2021-01-04
Attending: INTERNAL MEDICINE

## 2021-01-04 VITALS
WEIGHT: 137 LBS | HEART RATE: 85 BPM | SYSTOLIC BLOOD PRESSURE: 122 MMHG | HEIGHT: 59 IN | DIASTOLIC BLOOD PRESSURE: 64 MMHG | BODY MASS INDEX: 27.62 KG/M2

## 2021-01-04 DIAGNOSIS — Z45.018 PACEMAKER REPROGRAMMING/CHECK: ICD-10-CM

## 2021-01-04 PROCEDURE — 93288 INTERROG EVL PM/LDLS PM IP: CPT | Performed by: INTERNAL MEDICINE

## 2021-01-04 NOTE — PAT NURSING NOTE
Nacho Garrison at Dr. Miriam Wilson notified of abnormal urine culture and she notified  And pt. Rohit Rodriguez for surgery.

## 2021-01-04 NOTE — TELEPHONE ENCOUNTER
Per Leslee Gee' request called patient, informed that bactrim ds, is the correct med, will proceed with surgery as planned.

## 2021-01-05 ENCOUNTER — TELEPHONE (OUTPATIENT)
Dept: SURGERY | Facility: CLINIC | Age: 71
End: 2021-01-05

## 2021-01-05 ENCOUNTER — ANESTHESIA (OUTPATIENT)
Dept: SURGERY | Facility: HOSPITAL | Age: 71
End: 2021-01-05
Payer: MEDICARE

## 2021-01-05 ENCOUNTER — HOSPITAL ENCOUNTER (OUTPATIENT)
Facility: HOSPITAL | Age: 71
Setting detail: HOSPITAL OUTPATIENT SURGERY
Discharge: HOME | End: 2021-01-05
Attending: UROLOGY | Admitting: UROLOGY
Payer: MEDICARE

## 2021-01-05 ENCOUNTER — APPOINTMENT (OUTPATIENT)
Dept: GENERAL RADIOLOGY | Facility: HOSPITAL | Age: 71
End: 2021-01-05
Attending: UROLOGY
Payer: MEDICARE

## 2021-01-05 ENCOUNTER — ANESTHESIA EVENT (OUTPATIENT)
Dept: SURGERY | Facility: HOSPITAL | Age: 71
End: 2021-01-05
Payer: MEDICARE

## 2021-01-05 VITALS
HEIGHT: 59 IN | RESPIRATION RATE: 16 BRPM | TEMPERATURE: 97 F | DIASTOLIC BLOOD PRESSURE: 45 MMHG | OXYGEN SATURATION: 97 % | BODY MASS INDEX: 27.62 KG/M2 | HEART RATE: 60 BPM | WEIGHT: 137 LBS | SYSTOLIC BLOOD PRESSURE: 115 MMHG

## 2021-01-05 DIAGNOSIS — R31.0 GROSS HEMATURIA: ICD-10-CM

## 2021-01-05 DIAGNOSIS — N13.30 HYDRONEPHROSIS, UNSPECIFIED HYDRONEPHROSIS TYPE: ICD-10-CM

## 2021-01-05 DIAGNOSIS — Z01.818 PREOP TESTING: Primary | ICD-10-CM

## 2021-01-05 PROCEDURE — 0T768DZ DILATION OF RIGHT URETER WITH INTRALUMINAL DEVICE, VIA NATURAL OR ARTIFICIAL OPENING ENDOSCOPIC: ICD-10-PCS | Performed by: UROLOGY

## 2021-01-05 PROCEDURE — 52354 CYSTOURETERO W/BIOPSY: CPT | Performed by: UROLOGY

## 2021-01-05 PROCEDURE — 52332 CYSTOSCOPY AND TREATMENT: CPT | Performed by: UROLOGY

## 2021-01-05 RX ORDER — SODIUM CHLORIDE, SODIUM LACTATE, POTASSIUM CHLORIDE, CALCIUM CHLORIDE 600; 310; 30; 20 MG/100ML; MG/100ML; MG/100ML; MG/100ML
INJECTION, SOLUTION INTRAVENOUS CONTINUOUS
Status: DISCONTINUED | OUTPATIENT
Start: 2021-01-05 | End: 2021-01-05

## 2021-01-05 RX ORDER — PHENAZOPYRIDINE HYDROCHLORIDE 200 MG/1
200 TABLET, FILM COATED ORAL 3 TIMES DAILY PRN
Qty: 15 TABLET | Refills: 1 | Status: ON HOLD | OUTPATIENT
Start: 2021-01-05 | End: 2021-01-30

## 2021-01-05 RX ORDER — ROCURONIUM BROMIDE 10 MG/ML
INJECTION, SOLUTION INTRAVENOUS AS NEEDED
Status: DISCONTINUED | OUTPATIENT
Start: 2021-01-05 | End: 2021-01-05 | Stop reason: SURG

## 2021-01-05 RX ORDER — GLYCOPYRROLATE 0.2 MG/ML
INJECTION, SOLUTION INTRAMUSCULAR; INTRAVENOUS AS NEEDED
Status: DISCONTINUED | OUTPATIENT
Start: 2021-01-05 | End: 2021-01-05 | Stop reason: SURG

## 2021-01-05 RX ORDER — HYDROCODONE BITARTRATE AND ACETAMINOPHEN 5; 325 MG/1; MG/1
1 TABLET ORAL AS NEEDED
Status: DISCONTINUED | OUTPATIENT
Start: 2021-01-05 | End: 2021-01-05

## 2021-01-05 RX ORDER — HYDROMORPHONE HYDROCHLORIDE 1 MG/ML
0.4 INJECTION, SOLUTION INTRAMUSCULAR; INTRAVENOUS; SUBCUTANEOUS EVERY 5 MIN PRN
Status: DISCONTINUED | OUTPATIENT
Start: 2021-01-05 | End: 2021-01-05

## 2021-01-05 RX ORDER — PHENYLEPHRINE HCL 10 MG/ML
VIAL (ML) INJECTION AS NEEDED
Status: DISCONTINUED | OUTPATIENT
Start: 2021-01-05 | End: 2021-01-05 | Stop reason: SURG

## 2021-01-05 RX ORDER — ONDANSETRON 2 MG/ML
INJECTION INTRAMUSCULAR; INTRAVENOUS AS NEEDED
Status: DISCONTINUED | OUTPATIENT
Start: 2021-01-05 | End: 2021-01-05 | Stop reason: SURG

## 2021-01-05 RX ORDER — HYDROCODONE BITARTRATE AND ACETAMINOPHEN 5; 325 MG/1; MG/1
TABLET ORAL
Qty: 8 TABLET | Refills: 0 | Status: ON HOLD | OUTPATIENT
Start: 2021-01-05 | End: 2021-02-02

## 2021-01-05 RX ORDER — MORPHINE SULFATE 10 MG/ML
6 INJECTION, SOLUTION INTRAMUSCULAR; INTRAVENOUS EVERY 10 MIN PRN
Status: DISCONTINUED | OUTPATIENT
Start: 2021-01-05 | End: 2021-01-05

## 2021-01-05 RX ORDER — SODIUM CHLORIDE 9 MG/ML
INJECTION, SOLUTION INTRAVENOUS CONTINUOUS
Status: DISCONTINUED | OUTPATIENT
Start: 2021-01-05 | End: 2021-01-05

## 2021-01-05 RX ORDER — ACETAMINOPHEN 500 MG
1000 TABLET ORAL ONCE
Status: COMPLETED | OUTPATIENT
Start: 2021-01-05 | End: 2021-01-05

## 2021-01-05 RX ORDER — DEXAMETHASONE SODIUM PHOSPHATE 4 MG/ML
VIAL (ML) INJECTION AS NEEDED
Status: DISCONTINUED | OUTPATIENT
Start: 2021-01-05 | End: 2021-01-05 | Stop reason: SURG

## 2021-01-05 RX ORDER — MORPHINE SULFATE 4 MG/ML
2 INJECTION, SOLUTION INTRAMUSCULAR; INTRAVENOUS EVERY 10 MIN PRN
Status: DISCONTINUED | OUTPATIENT
Start: 2021-01-05 | End: 2021-01-05

## 2021-01-05 RX ORDER — ONDANSETRON 2 MG/ML
4 INJECTION INTRAMUSCULAR; INTRAVENOUS ONCE AS NEEDED
Status: DISCONTINUED | OUTPATIENT
Start: 2021-01-05 | End: 2021-01-05

## 2021-01-05 RX ORDER — HYDROMORPHONE HYDROCHLORIDE 1 MG/ML
0.2 INJECTION, SOLUTION INTRAMUSCULAR; INTRAVENOUS; SUBCUTANEOUS EVERY 5 MIN PRN
Status: DISCONTINUED | OUTPATIENT
Start: 2021-01-05 | End: 2021-01-05

## 2021-01-05 RX ORDER — MORPHINE SULFATE 4 MG/ML
4 INJECTION, SOLUTION INTRAMUSCULAR; INTRAVENOUS EVERY 10 MIN PRN
Status: DISCONTINUED | OUTPATIENT
Start: 2021-01-05 | End: 2021-01-05

## 2021-01-05 RX ORDER — PHENAZOPYRIDINE HYDROCHLORIDE 200 MG/1
200 TABLET, FILM COATED ORAL ONCE AS NEEDED
Status: CANCELLED | OUTPATIENT
Start: 2021-01-05 | End: 2021-01-05

## 2021-01-05 RX ORDER — LIDOCAINE HYDROCHLORIDE 10 MG/ML
INJECTION, SOLUTION EPIDURAL; INFILTRATION; INTRACAUDAL; PERINEURAL AS NEEDED
Status: DISCONTINUED | OUTPATIENT
Start: 2021-01-05 | End: 2021-01-05 | Stop reason: SURG

## 2021-01-05 RX ORDER — LIDOCAINE HYDROCHLORIDE 20 MG/ML
JELLY TOPICAL AS NEEDED
Status: DISCONTINUED | OUTPATIENT
Start: 2021-01-05 | End: 2021-01-05 | Stop reason: HOSPADM

## 2021-01-05 RX ORDER — NALOXONE HYDROCHLORIDE 0.4 MG/ML
80 INJECTION, SOLUTION INTRAMUSCULAR; INTRAVENOUS; SUBCUTANEOUS AS NEEDED
Status: DISCONTINUED | OUTPATIENT
Start: 2021-01-05 | End: 2021-01-05

## 2021-01-05 RX ORDER — HYDROCODONE BITARTRATE AND ACETAMINOPHEN 5; 325 MG/1; MG/1
2 TABLET ORAL AS NEEDED
Status: DISCONTINUED | OUTPATIENT
Start: 2021-01-05 | End: 2021-01-05

## 2021-01-05 RX ORDER — NEOSTIGMINE METHYLSULFATE 1 MG/ML
INJECTION INTRAVENOUS AS NEEDED
Status: DISCONTINUED | OUTPATIENT
Start: 2021-01-05 | End: 2021-01-05 | Stop reason: SURG

## 2021-01-05 RX ORDER — HYDROMORPHONE HYDROCHLORIDE 1 MG/ML
0.6 INJECTION, SOLUTION INTRAMUSCULAR; INTRAVENOUS; SUBCUTANEOUS EVERY 5 MIN PRN
Status: DISCONTINUED | OUTPATIENT
Start: 2021-01-05 | End: 2021-01-05

## 2021-01-05 RX ORDER — PHENAZOPYRIDINE HYDROCHLORIDE 200 MG/1
200 TABLET, FILM COATED ORAL 3 TIMES DAILY PRN
Qty: 15 TABLET | Refills: 1 | Status: ON HOLD
Start: 2021-01-05 | End: 2021-01-30

## 2021-01-05 RX ADMIN — PHENYLEPHRINE HCL 100 MCG: 10 MG/ML VIAL (ML) INJECTION at 08:20:00

## 2021-01-05 RX ADMIN — ROCURONIUM BROMIDE 50 MG: 10 INJECTION, SOLUTION INTRAVENOUS at 07:38:00

## 2021-01-05 RX ADMIN — LIDOCAINE HYDROCHLORIDE 50 MG: 10 INJECTION, SOLUTION EPIDURAL; INFILTRATION; INTRACAUDAL; PERINEURAL at 07:37:00

## 2021-01-05 RX ADMIN — NEOSTIGMINE METHYLSULFATE 3 MG: 1 INJECTION INTRAVENOUS at 08:52:00

## 2021-01-05 RX ADMIN — PHENYLEPHRINE HCL 100 MCG: 10 MG/ML VIAL (ML) INJECTION at 08:51:00

## 2021-01-05 RX ADMIN — PHENYLEPHRINE HCL 100 MCG: 10 MG/ML VIAL (ML) INJECTION at 08:10:00

## 2021-01-05 RX ADMIN — ONDANSETRON 4 MG: 2 INJECTION INTRAMUSCULAR; INTRAVENOUS at 08:53:00

## 2021-01-05 RX ADMIN — PHENYLEPHRINE HCL 100 MCG: 10 MG/ML VIAL (ML) INJECTION at 08:02:00

## 2021-01-05 RX ADMIN — SODIUM CHLORIDE: 9 INJECTION, SOLUTION INTRAVENOUS at 09:01:00

## 2021-01-05 RX ADMIN — PHENYLEPHRINE HCL 100 MCG: 10 MG/ML VIAL (ML) INJECTION at 07:55:00

## 2021-01-05 RX ADMIN — PHENYLEPHRINE HCL 100 MCG: 10 MG/ML VIAL (ML) INJECTION at 08:28:00

## 2021-01-05 RX ADMIN — PHENYLEPHRINE HCL 100 MCG: 10 MG/ML VIAL (ML) INJECTION at 08:12:00

## 2021-01-05 RX ADMIN — GLYCOPYRROLATE 0.6 MG: 0.2 INJECTION, SOLUTION INTRAMUSCULAR; INTRAVENOUS at 08:52:00

## 2021-01-05 RX ADMIN — DEXAMETHASONE SODIUM PHOSPHATE 4 MG: 4 MG/ML VIAL (ML) INJECTION at 08:08:00

## 2021-01-05 RX ADMIN — PHENYLEPHRINE HCL 100 MCG: 10 MG/ML VIAL (ML) INJECTION at 07:49:00

## 2021-01-05 NOTE — TELEPHONE ENCOUNTER
Kern Najjar,  Please set patient up for cystoscopy with removal of right ureteral stent in about 6 weeks--local anesthesia either office or surgery center; please let me know if patient would want a lorazepam 1.0 mg tablet 30 minutes before procedure, and I could issue the order. Working time, 20 minutes. I have sent patient messages that if she finds the stent very uncomfortable, stent could be removed after few weeks but would be better if she could maintain it for 6 weeks. Also patient to submit urine culture 7 days before procedure to make sure no infection. I will enter order.   Many thanks, Dr. Tanvi Atkinson

## 2021-01-05 NOTE — ANESTHESIA POSTPROCEDURE EVALUATION
Patient: Tyson Vidal    Procedure Summary     Date: 01/05/21 Room / Location: [address] [place] [address] MAIN OR 14 / [address] [place] [address] MAIN OR    Anesthesia Start: 1328 Anesthesia Stop: 6569    Procedure: CYSTOSCOPY RETROGRADE (Right ) Diagnosis:       Gross hematuria      Hydronephrosi

## 2021-01-05 NOTE — ANESTHESIA PROCEDURE NOTES
Airway  Date/Time: 1/5/2021 7:42 AM  Urgency: elective    Airway not difficult    General Information and Staff    Patient location during procedure: OR  Anesthesiologist: Janina Valentino MD  Performed: anesthesiologist     Indications and Patient Norman Lopez

## 2021-01-05 NOTE — H&P
PREOPERATIVE HISTORY AND PHYSICAL    Signed             HPI:    Patient ID: Sg House is a 65year old female.     HPI      Neurogenic Bladder / Urinary Retention   Incidentially found 10/30/2020 US KIDNEYS/BLADDER (ordered by nephrologist, Dr. Camp Pontiff Problem started morning of 11/05/2020; secondary to 11/04/2020 colon catheter insertion for urinary retention. Patient states the urine in her catheter was currently light pink in color. Her urine is darker in color or worse when she is walking.  She is cur Respiratory: Negative for chest tightness and shortness of breath. Cardiovascular: Negative for chest pain. Gastrointestinal: Negative for abdominal pain and constipation.         Positive for flank pain   Genitourinary: Negative for dysuria, flank jaqui   Atrial Fibrillation   • Arthritis     • H/O complete eye exam 1997   • H/O diagnostic mammography [id number]   • H/O exercise stress test 1995   • High blood pressure     • High cholesterol     • HTN (hypertension)     • Hyperlipidemia     • Hyperlipidemi Nursing note reviewed.              11/30/20  1115   BP: 131/78   Pulse: 76   Resp: 16   Weight: 142 lb (64.4 kg)   Height: 4' 11\" (1.499 m)            Body mass index is 28.68 kg/m².           LABORATORIES    11/22/2020 Urine culture = no growth 2 days 10/30/2020 US KIDNEYS/BLADDER = Postvoid bladder volume of 1,172 mL; Marked right-sided hydronephrosis;  Limited visualization right ureter; Asymmetric enlarged right kidney with left kidney relatively smaller; Marked bladder distention.            11/05/20 10/30/2020 US KIDNEYS/BLADDER = Marked right-sided hydronephrosis; Limited visualization right ureter; Asymmetric enlarged right kidney with left kidney relatively smaller. 11/04/2020 EM ER; Lawson catheter inserted.  11/06/2020 NM RENAL WITH LASIX (study p Resolved. Problem started morning of 11/05/2020; secondary to 11/04/2020 colon catheter insertion for urinary retention. She is currently taking Xarelto 20 mg daily. She states that she only had gross hematuria with Colon catheter present.  The patient no l Incidentially found 10/30/2020 US KIDNEYS/BLADDER (ordered by nephrologist, Dr. Jamilah Henriquez, due to mildly impaired kidney function) = Postvoid bladder volume of 1,172 mL; Marked bladder distention. 11/04/2020 [facility]; colon catheter insertion.  11/05/2020 Simpl Chronic. Likely a type of overflow incontinence secondary to urinary retention. Patient is currently self-catheterizing herself 4x daily; she does not urinate on her own in between catheters, but she does leak urine. She states stress incontinence.  The pat I explained to patient the risks, side effects, and alternatives, and I answered questions concerning them; patient understands all of this and decides to proceed with the following:      Treatment Plan & Patient Instructions     1.   Urine specimen today f

## 2021-01-05 NOTE — TELEPHONE ENCOUNTER
Urology discharge instructions for patient, who underwent right nephro ureteroscopic surgery today---    Urology discharge instructions for the patient--  1.   If urine is pink or red, drink more fluids to produce more urine output, thereby diluting out any

## 2021-01-05 NOTE — ANESTHESIA PREPROCEDURE EVALUATION
Anesthesia PreOp Note    HPI:     Taylor Suarez is a 65year old female who presents for preoperative consultation requested by: Madiha Rodriguez MD    Date of Surgery: 1/5/2021    Procedure(s):  CYSTOSCOPY RETROGRADE  URINARY BIOPSY  Indication: Merry Jones • OTHER Right     ankle surgery   • PACEMAKER           •  Sulfamethoxazole-TMP -160 MG Oral Tab per tablet, Take 1 tablet by mouth 2 (two) times daily for 10 days. , Disp: 20 tablet, Rfl: 0, 1/5/2021 at Unknown time    •  ROSUVASTATIN CALCIUM 10 MG O No current [facility]-ordered outpatient medications on file.         Levaquin [Levofloxa*    HIVES  Vancomycin              HIVES, OTHER (SEE COMMENTS)    Family History   Problem Relation Age of Onset   • Breast Cancer Other 28   • Heart Disease Father    • Lip Hobby Hazards: Not Asked        Sleep Concern: Not Asked        Stress Concern: Not Asked        Weight Concern: Not Asked        Special Diet: Not Asked        Back Care: Not Asked        Exercise: Not Asked        Bike Helmet: Not Asked        Seat Plan:   General  Airway:  ETT  Implantable Device Management: Magnet  Post-op Pain Management: IV analgesics  Plan Comments: Will have magnet available in OR for case.    Informed Consent Plan and Risks Discussed With:  Patient  Discussed plan with:  [place]ana Parks

## 2021-01-05 NOTE — OPERATIVE REPORT
[facility]    PATIENT'S NAME: Seamus Gilbert PHYSICIAN: Governor aJden MD   OPERATING PHYSICIAN: Governor Jaden MD   PATIENT ACCOUNT#:   [id number]    LOCATION:  [place] [address]  MEDICAL RECORD #:   [id number] had to be canceled a few times, we are proceeding with surgery today. I have fully explained the benefits and risks and alternatives to the patient, she understands and wants to proceed. FINDINGS:  Urethra was unremarkable.   The bladder shows signs of side using 50% Isovue-300 and 50% sterile water and findings as above.   Next, I passed 2 separate 0.035 guidewires into the right kidney; I then dilated the ureter with a 10-Belarusian ureteral catheter; I started performing ureteroscopy with the digital Storz

## 2021-01-05 NOTE — BRIEF OP NOTE
[facility] POST ANESTHESIA CARE UNIT  Brief Op Note       Patients Name: [facility]  Attending Physician: Jatin Cintron MD  Operating Physician: Mj Wing MD  CSN: [id number]     Location:  OR  MRN: [id number]    Date of Birth: [birth year]

## 2021-01-05 NOTE — INTERVAL H&P NOTE
Pre-op Diagnosis: Gross hematuria [R31.0]  Hydronephrosis, unspecified hydronephrosis type [N13.30]    The above referenced H&P was reviewed by Randy Patel MD on 1/5/2021, the patient was examined and no significant changes have occurred in the patie

## 2021-01-06 ENCOUNTER — PATIENT MESSAGE (OUTPATIENT)
Dept: SURGERY | Facility: CLINIC | Age: 71
End: 2021-01-06

## 2021-01-06 NOTE — TELEPHONE ENCOUNTER
From: Kaden Hollins  To: Natali Bethea MD  Sent: 1/6/2021 7:49 AM CST  Subject: Visit Follow-up Question    I am still not urinating on my own. Still using catheter 4x daily. No need for pain meds since I have no pain.  Normal or abnormal after this

## 2021-01-07 ENCOUNTER — HOSPITAL ENCOUNTER (EMERGENCY)
Facility: HOSPITAL | Age: 71
Discharge: HOME | End: 2021-01-07
Attending: EMERGENCY MEDICINE
Payer: MEDICARE

## 2021-01-07 VITALS
TEMPERATURE: 99 F | OXYGEN SATURATION: 96 % | RESPIRATION RATE: 18 BRPM | WEIGHT: 137 LBS | HEART RATE: 60 BPM | DIASTOLIC BLOOD PRESSURE: 61 MMHG | SYSTOLIC BLOOD PRESSURE: 128 MMHG | HEIGHT: 59 IN | BODY MASS INDEX: 27.62 KG/M2

## 2021-01-07 DIAGNOSIS — Z96.0 STATUS POST PLACEMENT OF URETERAL STENT: ICD-10-CM

## 2021-01-07 DIAGNOSIS — R31.0 GROSS HEMATURIA: Primary | ICD-10-CM

## 2021-01-07 LAB
ANION GAP SERPL CALC-SCNC: 6 MMOL/L (ref 0–18)
BASOPHILS # BLD AUTO: 0.02 X10(3) UL (ref 0–0.2)
BASOPHILS NFR BLD AUTO: 0.3 %
BILIRUB UR QL: NEGATIVE
BUN BLD-MCNC: 18 MG/DL (ref 7–18)
BUN/CREAT SERPL: 14.5 (ref 10–20)
CALCIUM BLD-MCNC: 9.8 MG/DL (ref 8.5–10.1)
CHLORIDE SERPL-SCNC: 110 MMOL/L (ref 98–112)
CLARITY UR: CLEAR
CO2 SERPL-SCNC: 25 MMOL/L (ref 21–32)
CREAT BLD-MCNC: 1.24 MG/DL
DEPRECATED RDW RBC AUTO: 41.9 FL (ref 35.1–46.3)
EOSINOPHIL # BLD AUTO: 0.18 X10(3) UL (ref 0–0.7)
EOSINOPHIL NFR BLD AUTO: 3.1 %
ERYTHROCYTE [DISTWIDTH] IN BLOOD BY AUTOMATED COUNT: 13.9 % (ref 11–15)
GLUCOSE BLD-MCNC: 89 MG/DL (ref 70–99)
GLUCOSE UR-MCNC: NEGATIVE MG/DL
HCT VFR BLD AUTO: 39.3 %
HGB BLD-MCNC: 12.6 G/DL
HYALINE CASTS #/AREA URNS AUTO: 2 /LPF
IMM GRANULOCYTES # BLD AUTO: 0.02 X10(3) UL (ref 0–1)
IMM GRANULOCYTES NFR BLD: 0.3 %
KETONES UR-MCNC: NEGATIVE MG/DL
LYMPHOCYTES # BLD AUTO: 1.43 X10(3) UL (ref 1–4)
LYMPHOCYTES NFR BLD AUTO: 24.6 %
MCH RBC QN AUTO: 26.6 PG (ref 26–34)
MCHC RBC AUTO-ENTMCNC: 32.1 G/DL (ref 31–37)
MCV RBC AUTO: 83.1 FL
MONOCYTES # BLD AUTO: 0.45 X10(3) UL (ref 0.1–1)
MONOCYTES NFR BLD AUTO: 7.7 %
NEUTROPHILS # BLD AUTO: 3.72 X10 (3) UL (ref 1.5–7.7)
NEUTROPHILS # BLD AUTO: 3.72 X10(3) UL (ref 1.5–7.7)
NEUTROPHILS NFR BLD AUTO: 64 %
NITRITE UR QL STRIP.AUTO: NEGATIVE
OSMOLALITY SERPL CALC.SUM OF ELEC: 293 MOSM/KG (ref 275–295)
PH UR: 7 [PH] (ref 5–8)
PLATELET # BLD AUTO: 210 10(3)UL (ref 150–450)
POTASSIUM SERPL-SCNC: 3.7 MMOL/L (ref 3.5–5.1)
PROT UR-MCNC: 100 MG/DL
RBC # BLD AUTO: 4.73 X10(6)UL
RBC #/AREA URNS AUTO: 831 /HPF
SODIUM SERPL-SCNC: 141 MMOL/L (ref 136–145)
SP GR UR STRIP: 1 (ref 1–1.03)
UROBILINOGEN UR STRIP-ACNC: <2
WBC # BLD AUTO: 5.8 X10(3) UL (ref 4–11)
WBC #/AREA URNS AUTO: 14 /HPF

## 2021-01-07 PROCEDURE — 80048 BASIC METABOLIC PNL TOTAL CA: CPT | Performed by: EMERGENCY MEDICINE

## 2021-01-07 PROCEDURE — 87086 URINE CULTURE/COLONY COUNT: CPT | Performed by: EMERGENCY MEDICINE

## 2021-01-07 PROCEDURE — 96365 THER/PROPH/DIAG IV INF INIT: CPT

## 2021-01-07 PROCEDURE — 85025 COMPLETE CBC W/AUTO DIFF WBC: CPT | Performed by: EMERGENCY MEDICINE

## 2021-01-07 PROCEDURE — 81001 URINALYSIS AUTO W/SCOPE: CPT | Performed by: EMERGENCY MEDICINE

## 2021-01-07 PROCEDURE — 99284 EMERGENCY DEPT VISIT MOD MDM: CPT

## 2021-01-07 RX ORDER — AZITHROMYCIN 250 MG/1
1000 TABLET, FILM COATED ORAL ONCE
Status: DISCONTINUED | OUTPATIENT
Start: 2021-01-07 | End: 2021-01-07

## 2021-01-07 NOTE — ED NOTES
PT safe to DC home per MD. Fatimah Cue to dress self. DC teaching done, pt verbalizes understanding. Ambulatory with steady gait to exit.
Per ED tech pt bladder scanned for 315ml prior to cathing herself around 0620 for her urine sample. Post cath bladder scan done and 143ml.
none

## 2021-01-07 NOTE — TELEPHONE ENCOUNTER
I sent patient the following message by means of \"my chart\" =    Marlene Sawant need to continue intermittent catheterization 4 times daily because your bladder does not work normally; the nerves are dysfunctional.  The procedure that you underwent on 1/5/

## 2021-01-07 NOTE — ED PROVIDER NOTES
Patient Seen in: [facility] AND [facility] Emergency Department      History   Patient presents with:  Bleeding: S/P renal stent    Stated Complaint: Urinary Issue    HPI/Subjective:   HPI    The patient is a 70-year-old female status post right ureteral stent p of Systems    Positive for stated complaint: Urinary Issue  Other systems are as noted in HPI. Constitutional and vital signs reviewed. All other systems reviewed and negative except as noted above.     Physical Exam     ED Triage Vitals   BP 01/07/21 are normal and symmetric.    Psychiatric:         Judgment: Judgment normal.       Differential diagnosis includes medication reaction, infection, complication of procedure        ED Course     Labs Reviewed   BASIC METABOLIC PANEL (8) - Abnormal; Notable f air    Cardiac Monitor: Pulse Readings from Last 1 Encounters:  01/07/21 : 60  , sinus, normal                         Disposition and Plan     Clinical Impression:  Gross hematuria  (primary encounter diagnosis)  Status post placement of ureteral stent

## 2021-01-08 ENCOUNTER — TELEPHONE (OUTPATIENT)
Dept: SURGERY | Facility: CLINIC | Age: 71
End: 2021-01-08

## 2021-01-08 NOTE — TELEPHONE ENCOUNTER
CAROLINE @ [phone] & [phone]. I discussed pt with PVK & he advised as follows:  1. Continue to increase water intake. 2. Avoid lifting heavy objects. 3. Continue taking Xarelto every other day.   4.  1/7/21 ER urine culture shows \"no growth in 18-

## 2021-01-08 NOTE — TELEPHONE ENCOUNTER
Pt called stating pt was at the emergency room yesterday 1-7-21. Advised to call the office today.   Please call pt

## 2021-01-08 NOTE — TELEPHONE ENCOUNTER
Pt returned the call.   Pt advised office is closed for the day and will return on Monday 1-11-21 at 8:00am.

## 2021-01-11 ENCOUNTER — OFFICE VISIT (OUTPATIENT)
Dept: SURGERY | Facility: CLINIC | Age: 71
End: 2021-01-11
Payer: MEDICARE

## 2021-01-11 VITALS
BODY MASS INDEX: 27.62 KG/M2 | RESPIRATION RATE: 16 BRPM | SYSTOLIC BLOOD PRESSURE: 126 MMHG | DIASTOLIC BLOOD PRESSURE: 75 MMHG | HEART RATE: 87 BPM | HEIGHT: 59 IN | WEIGHT: 137 LBS

## 2021-01-11 DIAGNOSIS — N19 RENAL FAILURE, UNSPECIFIED CHRONICITY: ICD-10-CM

## 2021-01-11 DIAGNOSIS — N13.30 HYDRONEPHROSIS, UNSPECIFIED HYDRONEPHROSIS TYPE: Primary | ICD-10-CM

## 2021-01-11 DIAGNOSIS — R31.0 GROSS HEMATURIA: ICD-10-CM

## 2021-01-11 DIAGNOSIS — N39.3 STRESS INCONTINENCE IN FEMALE: ICD-10-CM

## 2021-01-11 DIAGNOSIS — R33.9 URINARY RETENTION: ICD-10-CM

## 2021-01-11 DIAGNOSIS — R10.9 ABDOMINAL PAIN, UNSPECIFIED ABDOMINAL LOCATION: ICD-10-CM

## 2021-01-11 DIAGNOSIS — N31.9 NEUROGENIC BLADDER: ICD-10-CM

## 2021-01-11 DIAGNOSIS — N39.0 RECURRENT UTI: ICD-10-CM

## 2021-01-11 DIAGNOSIS — Z79.01 ON RIVAROXABAN THERAPY: ICD-10-CM

## 2021-01-11 PROCEDURE — 99215 OFFICE O/P EST HI 40 MIN: CPT | Performed by: UROLOGY

## 2021-01-11 RX ORDER — LISINOPRIL AND HYDROCHLOROTHIAZIDE 20; 12.5 MG/1; MG/1
TABLET ORAL
Qty: 90 TABLET | Refills: 1 | Status: SHIPPED | OUTPATIENT
Start: 2021-01-11 | End: 2021-07-16

## 2021-01-11 RX ORDER — METHENAMINE HIPPURATE 1000 MG/1
0.5 TABLET ORAL 2 TIMES DAILY
Qty: 90 TABLET | Refills: 3 | Status: SHIPPED | OUTPATIENT
Start: 2021-01-11 | End: 2021-05-17

## 2021-01-11 NOTE — PATIENT INSTRUCTIONS
Fredis Lima M.D.    1.  Continue self intermittent catheterization 4 times every 24 hours. Please use sterile technique. 2.  Please take your Xarelto/apixaban tonight.     3.  Please call Serenity Leiva [phone] or

## 2021-01-11 NOTE — PROGRESS NOTES
HPI:    Patient ID: Jillian Marcum is a 65year old female. HPI     Hydronephrosis   10/30/2020 US KIDNEYS/BLADDER = Marked right-sided hydronephrosis;  Limited visualization right ureter; Asymmetric enlarged right kidney with left kidney relatively sm Neurogenic Bladder / Urinary Retention   Incidentially found 10/30/2020  KIDNEYS/BLADDER (ordered by nephrologist, Dr. Kamran Kay, due to mildly impaired kidney function) = Postvoid bladder volume of 1,172 mL; Marked bladder distention.  11/04/2020 [facility] bloating. She states this is better when she is lying down and worse when she is standing, walking, or sitting. The patient presently denies any recent or recurrent abdominal pain.  She feels this is stable as she is currently asymptomatic.      Anticoagula Genitourinary: Positive for hematuria. Negative for dysuria and flank pain. Musculoskeletal: Positive for back pain. Neurological: Negative for speech difficulty. Psychiatric/Behavioral: The patient is not nervous/anxious.           Current Outpatie Oral Tab Take 1 tablet (200 mg total) by mouth 3 (three) times daily as needed (for burning pain with urination).  15 tablet 1     Allergies:  Levaquin [Levofloxa*    HIVES  Vancomycin              HIVES, OTHER (SEE COMMENTS)    HISTORY:  Past Medical Histo Pulmonary/Chest: Effort normal. No respiratory distress. Musculoskeletal: Normal range of motion. Neurological: She is alert and oriented to person, place, and time. Skin: Skin is dry. Psychiatric: She has a normal mood and affect.  Thought conten       UROLOGICAL IMAGING   11/21/2020 XR CYSTOGRAM = Moderately trabeculated bladder with multiple small diverticula; No reflux;  Lawson catheter in place      11/09/2020 CT UROGRAM (W+WO) = Chronic right hydroureteronephrosis without visible obstructing c continue self-catheterization 4x per 24 hours      I spent  45  minutes with patient, in reviewing chart and labs before entering the room, taking patient's  history, examining patient, reviewing past medical records, current medications, allergies, labora discussed cystoscopy stent removal without oral diazepam under local anesthesia.  I fully explained to patient the benefits, risks, and alternatives to this treatment option and I answered patient's questions; patient decides to undergo above outlined treat however, bladder sensation and muscle tone intact and relatively unremarkable; Bladder capacity unremarkable; No evidence of neurogenic bladder.  11/09/2020 CT UROGRAM (W+WO) = Marked circumferential urinary bladder wall thickening with a small 1.8 cm right observation after finishing current course of antibiotic.  I fully explained to patient the benefits, risks, and alternatives to this treatment option and I answered patient's questions; patient decides to start HipRex 1 g, half-tab BID along with vitamin C alternatives, and I answered questions concerning them; patient understands all of this and decides to proceed with the following:      Treatment Plan & Patient Instructions    1. Continue self intermittent catheterization 4 times every 24 hours.   Please and in my presence. I have reviewed the chart and discharge instructions (if applicable) and agree that the record reflects my personal performance and is accurate and complete.   Klarissa Yee MD, 1/11/2021, 6:36 PM

## 2021-01-12 NOTE — PROGRESS NOTES
Late entry 01/11/2021= LEIAK requested I review pt technique for straight cath procedure; she has had 2 UTIs since process started. Instructed as follows:  1. Arrange self-cath to be performed in her own home. 2. Allow enough time for proper technique.   3.

## 2021-01-20 ENCOUNTER — TELEPHONE (OUTPATIENT)
Dept: SURGERY | Facility: CLINIC | Age: 71
End: 2021-01-20

## 2021-01-20 NOTE — TELEPHONE ENCOUNTER
Spoke with patient scheduled cystoscopy, right  stent removal, [facility] surgery center, Thursday 02/25/2021, informed patient that I will send pre-op instructions via my chart.

## 2021-01-21 ENCOUNTER — PATIENT MESSAGE (OUTPATIENT)
Dept: SURGERY | Facility: CLINIC | Age: 71
End: 2021-01-21

## 2021-01-21 NOTE — TELEPHONE ENCOUNTER
Called patient she can't do Thursday 02/18/2021. Patient will have cystoscopy, right stent with  Thursday 02/25/2021. I will cancel office cysto and schedule for the surgery center.

## 2021-01-26 ENCOUNTER — PATIENT MESSAGE (OUTPATIENT)
Dept: NEPHROLOGY | Facility: CLINIC | Age: 71
End: 2021-01-26

## 2021-01-26 DIAGNOSIS — R50.9 FEVER, UNSPECIFIED FEVER CAUSE: Primary | ICD-10-CM

## 2021-01-27 ENCOUNTER — LAB ENCOUNTER (OUTPATIENT)
Dept: LAB | Facility: HOSPITAL | Age: 71
DRG: 872 | End: 2021-01-27
Attending: INTERNAL MEDICINE
Payer: MEDICARE

## 2021-01-27 DIAGNOSIS — R50.9 FEVER, UNSPECIFIED FEVER CAUSE: ICD-10-CM

## 2021-01-27 NOTE — TELEPHONE ENCOUNTER
From: Jillian Marcum  To: Darrion Wilde MD  Sent: 1/26/2021 8:59 PM CST  Subject: Other    Need to get a Covid-19 test. Have a fever of 100, and can't get warm.  Where can I go to get a test?

## 2021-01-28 LAB — SARS-COV-2 RNA RESP QL NAA+PROBE: NOT DETECTED

## 2021-01-29 ENCOUNTER — TELEPHONE (OUTPATIENT)
Dept: NEPHROLOGY | Facility: CLINIC | Age: 71
End: 2021-01-29

## 2021-01-29 ENCOUNTER — APPOINTMENT (OUTPATIENT)
Dept: GENERAL RADIOLOGY | Facility: HOSPITAL | Age: 71
DRG: 872 | End: 2021-01-29
Attending: EMERGENCY MEDICINE
Payer: MEDICARE

## 2021-01-29 ENCOUNTER — E-ADVICE (OUTPATIENT)
Dept: CARDIOLOGY | Age: 71
End: 2021-01-29

## 2021-01-29 ENCOUNTER — PATIENT MESSAGE (OUTPATIENT)
Dept: SURGERY | Facility: CLINIC | Age: 71
End: 2021-01-29

## 2021-01-29 ENCOUNTER — LAB ENCOUNTER (OUTPATIENT)
Dept: LAB | Facility: HOSPITAL | Age: 71
DRG: 872 | End: 2021-01-29
Attending: INTERNAL MEDICINE
Payer: MEDICARE

## 2021-01-29 ENCOUNTER — TELEPHONE (OUTPATIENT)
Dept: SURGERY | Facility: CLINIC | Age: 71
End: 2021-01-29

## 2021-01-29 ENCOUNTER — HOSPITAL ENCOUNTER (EMERGENCY)
Facility: HOSPITAL | Age: 71
Discharge: HOME | DRG: 872 | End: 2021-01-29
Attending: EMERGENCY MEDICINE
Payer: MEDICARE

## 2021-01-29 ENCOUNTER — TELEPHONE (OUTPATIENT)
Dept: CARDIOLOGY | Age: 71
End: 2021-01-29

## 2021-01-29 VITALS
TEMPERATURE: 98 F | BODY MASS INDEX: 27 KG/M2 | WEIGHT: 135 LBS | DIASTOLIC BLOOD PRESSURE: 62 MMHG | SYSTOLIC BLOOD PRESSURE: 142 MMHG | OXYGEN SATURATION: 98 % | HEART RATE: 61 BPM | RESPIRATION RATE: 22 BRPM

## 2021-01-29 DIAGNOSIS — N18.31 STAGE 3A CHRONIC KIDNEY DISEASE (HCC): ICD-10-CM

## 2021-01-29 DIAGNOSIS — N30.00 ACUTE CYSTITIS WITHOUT HEMATURIA: Primary | ICD-10-CM

## 2021-01-29 DIAGNOSIS — R50.9 FEVER OF UNKNOWN ORIGIN: ICD-10-CM

## 2021-01-29 LAB
ALBUMIN SERPL-MCNC: 3.6 G/DL (ref 3.4–5)
ANION GAP SERPL CALC-SCNC: 7 MMOL/L (ref 0–18)
BASOPHILS # BLD AUTO: 0.03 X10(3) UL (ref 0–0.2)
BASOPHILS NFR BLD AUTO: 0.3 %
BILIRUB UR QL: NEGATIVE
BUN BLD-MCNC: 18 MG/DL (ref 7–18)
BUN/CREAT SERPL: 15.3 (ref 10–20)
CALCIUM BLD-MCNC: 9.2 MG/DL (ref 8.5–10.1)
CHLORIDE SERPL-SCNC: 104 MMOL/L (ref 98–112)
CO2 SERPL-SCNC: 26 MMOL/L (ref 21–32)
COLOR UR: YELLOW
CREAT BLD-MCNC: 1.18 MG/DL
DEPRECATED RDW RBC AUTO: 42.1 FL (ref 35.1–46.3)
EOSINOPHIL # BLD AUTO: 0.05 X10(3) UL (ref 0–0.7)
EOSINOPHIL NFR BLD AUTO: 0.5 %
ERYTHROCYTE [DISTWIDTH] IN BLOOD BY AUTOMATED COUNT: 13.8 % (ref 11–15)
GLUCOSE BLD-MCNC: 96 MG/DL (ref 70–99)
GLUCOSE UR-MCNC: NEGATIVE MG/DL
HCT VFR BLD AUTO: 38.7 %
HGB BLD-MCNC: 12.4 G/DL
HYALINE CASTS #/AREA URNS AUTO: 3 /LPF
IMM GRANULOCYTES # BLD AUTO: 0.04 X10(3) UL (ref 0–1)
IMM GRANULOCYTES NFR BLD: 0.4 %
LYMPHOCYTES # BLD AUTO: 0.81 X10(3) UL (ref 1–4)
LYMPHOCYTES NFR BLD AUTO: 8.3 %
MCH RBC QN AUTO: 26.8 PG (ref 26–34)
MCHC RBC AUTO-ENTMCNC: 32 G/DL (ref 31–37)
MCV RBC AUTO: 83.6 FL
MONOCYTES # BLD AUTO: 0.86 X10(3) UL (ref 0.1–1)
MONOCYTES NFR BLD AUTO: 8.8 %
NEUTROPHILS # BLD AUTO: 7.99 X10 (3) UL (ref 1.5–7.7)
NEUTROPHILS # BLD AUTO: 7.99 X10(3) UL (ref 1.5–7.7)
NEUTROPHILS NFR BLD AUTO: 81.7 %
NITRITE UR QL STRIP.AUTO: NEGATIVE
OSMOLALITY SERPL CALC.SUM OF ELEC: 286 MOSM/KG (ref 275–295)
PH UR: 5 [PH] (ref 5–8)
PHOSPHATE SERPL-MCNC: 2.4 MG/DL (ref 2.5–4.9)
PLATELET # BLD AUTO: 200 10(3)UL (ref 150–450)
POTASSIUM SERPL-SCNC: 3.1 MMOL/L (ref 3.5–5.1)
PROT UR-MCNC: 100 MG/DL
RBC # BLD AUTO: 4.63 X10(6)UL
RBC #/AREA URNS AUTO: 297 /HPF
SARS-COV-2 RNA RESP QL NAA+PROBE: NOT DETECTED
SODIUM SERPL-SCNC: 137 MMOL/L (ref 136–145)
SP GR UR STRIP: 1.02 (ref 1–1.03)
UROBILINOGEN UR STRIP-ACNC: <2
WBC # BLD AUTO: 9.8 X10(3) UL (ref 4–11)
WBC #/AREA URNS AUTO: 18 /HPF

## 2021-01-29 PROCEDURE — 71045 X-RAY EXAM CHEST 1 VIEW: CPT | Performed by: EMERGENCY MEDICINE

## 2021-01-29 PROCEDURE — 36415 COLL VENOUS BLD VENIPUNCTURE: CPT

## 2021-01-29 PROCEDURE — 99284 EMERGENCY DEPT VISIT MOD MDM: CPT

## 2021-01-29 PROCEDURE — 87150 DNA/RNA AMPLIFIED PROBE: CPT | Performed by: EMERGENCY MEDICINE

## 2021-01-29 PROCEDURE — 87040 BLOOD CULTURE FOR BACTERIA: CPT | Performed by: EMERGENCY MEDICINE

## 2021-01-29 PROCEDURE — 81001 URINALYSIS AUTO W/SCOPE: CPT | Performed by: EMERGENCY MEDICINE

## 2021-01-29 PROCEDURE — 87186 SC STD MICRODIL/AGAR DIL: CPT | Performed by: EMERGENCY MEDICINE

## 2021-01-29 PROCEDURE — 87086 URINE CULTURE/COLONY COUNT: CPT | Performed by: EMERGENCY MEDICINE

## 2021-01-29 PROCEDURE — 85025 COMPLETE CBC W/AUTO DIFF WBC: CPT

## 2021-01-29 PROCEDURE — 80069 RENAL FUNCTION PANEL: CPT

## 2021-01-29 PROCEDURE — 87077 CULTURE AEROBIC IDENTIFY: CPT | Performed by: EMERGENCY MEDICINE

## 2021-01-29 RX ORDER — CEPHALEXIN 500 MG/1
500 CAPSULE ORAL 2 TIMES DAILY
Qty: 14 CAPSULE | Refills: 0 | Status: ON HOLD | OUTPATIENT
Start: 2021-01-29 | End: 2021-02-02

## 2021-01-29 NOTE — TELEPHONE ENCOUNTER
Tee Hernandez,     This is José Penny, Dr. Dweayne Bledsoe' nurse. Yes, patient is in the ER right now.      Yarelis BRONSON

## 2021-01-29 NOTE — TELEPHONE ENCOUNTER
Mychart message converted to TE Acute    ----- Message from Steve Ellsworth sent at 1/29/2021 10:40 AM CST -----  Regarding: Other  Contact: [phone]  On my way for a blood test per Dr Teddy Montoya. I had a fever of 103 last night .  Had fever, chills, a

## 2021-01-29 NOTE — TELEPHONE ENCOUNTER
Patient contacted. Patient c/o fever, 103.0 temperature last night, chills, 100.1 temp early this morning. Patient states she has been taking tylenol that helps bring down her fever. Patient tells me that her PCP, has advised her to seek eval in ER.

## 2021-01-29 NOTE — TELEPHONE ENCOUNTER
Kidney function mildly abnormal but stable. Potassium was low. Increase potassium content in diet. CBC shows that she is not anemic but white count is borderline high.   Is she going to the ER??

## 2021-01-30 ENCOUNTER — HOSPITAL ENCOUNTER (INPATIENT)
Facility: HOSPITAL | Age: 71
LOS: 3 days | Discharge: HOME | DRG: 872 | End: 2021-02-02
Attending: EMERGENCY MEDICINE | Admitting: HOSPITALIST
Payer: MEDICARE

## 2021-01-30 DIAGNOSIS — E87.6 HYPOKALEMIA: ICD-10-CM

## 2021-01-30 DIAGNOSIS — R78.81 BACTEREMIA: Primary | ICD-10-CM

## 2021-01-30 LAB
ANION GAP SERPL CALC-SCNC: 10 MMOL/L (ref 0–18)
BASOPHILS # BLD AUTO: 0.01 X10(3) UL (ref 0–0.2)
BASOPHILS NFR BLD AUTO: 0.1 %
BUN BLD-MCNC: 18 MG/DL (ref 7–18)
BUN/CREAT SERPL: 15.1 (ref 10–20)
CALCIUM BLD-MCNC: 8.9 MG/DL (ref 8.5–10.1)
CHLORIDE SERPL-SCNC: 100 MMOL/L (ref 98–112)
CO2 SERPL-SCNC: 25 MMOL/L (ref 21–32)
CREAT BLD-MCNC: 1.19 MG/DL
DEPRECATED RDW RBC AUTO: 39.7 FL (ref 35.1–46.3)
EOSINOPHIL # BLD AUTO: 0 X10(3) UL (ref 0–0.7)
EOSINOPHIL NFR BLD AUTO: 0 %
ERYTHROCYTE [DISTWIDTH] IN BLOOD BY AUTOMATED COUNT: 13.5 % (ref 11–15)
GLUCOSE BLD-MCNC: 100 MG/DL (ref 70–99)
HCT VFR BLD AUTO: 36.2 %
HGB BLD-MCNC: 12.1 G/DL
IMM GRANULOCYTES # BLD AUTO: 0.02 X10(3) UL (ref 0–1)
IMM GRANULOCYTES NFR BLD: 0.2 %
LACTATE SERPL-SCNC: 0.9 MMOL/L (ref 0.4–2)
LYMPHOCYTES # BLD AUTO: 0.5 X10(3) UL (ref 1–4)
LYMPHOCYTES NFR BLD AUTO: 5.7 %
MCH RBC QN AUTO: 27.1 PG (ref 26–34)
MCHC RBC AUTO-ENTMCNC: 33.4 G/DL (ref 31–37)
MCV RBC AUTO: 81 FL
MONOCYTES # BLD AUTO: 0.92 X10(3) UL (ref 0.1–1)
MONOCYTES NFR BLD AUTO: 10.4 %
NEUTROPHILS # BLD AUTO: 7.36 X10 (3) UL (ref 1.5–7.7)
NEUTROPHILS # BLD AUTO: 7.36 X10(3) UL (ref 1.5–7.7)
NEUTROPHILS NFR BLD AUTO: 83.6 %
OSMOLALITY SERPL CALC.SUM OF ELEC: 282 MOSM/KG (ref 275–295)
PLATELET # BLD AUTO: 201 10(3)UL (ref 150–450)
POTASSIUM SERPL-SCNC: 3.1 MMOL/L (ref 3.5–5.1)
RBC # BLD AUTO: 4.47 X10(6)UL
SODIUM SERPL-SCNC: 135 MMOL/L (ref 136–145)
WBC # BLD AUTO: 8.8 X10(3) UL (ref 4–11)

## 2021-01-30 PROCEDURE — 99223 1ST HOSP IP/OBS HIGH 75: CPT | Performed by: HOSPITALIST

## 2021-01-30 RX ORDER — SODIUM CHLORIDE 9 MG/ML
INJECTION, SOLUTION INTRAVENOUS CONTINUOUS
Status: ACTIVE | OUTPATIENT
Start: 2021-01-30 | End: 2021-01-30

## 2021-01-30 RX ORDER — ONDANSETRON 2 MG/ML
4 INJECTION INTRAMUSCULAR; INTRAVENOUS EVERY 6 HOURS PRN
Status: DISCONTINUED | OUTPATIENT
Start: 2021-01-30 | End: 2021-02-02

## 2021-01-30 RX ORDER — METOCLOPRAMIDE HYDROCHLORIDE 5 MG/ML
10 INJECTION INTRAMUSCULAR; INTRAVENOUS EVERY 8 HOURS PRN
Status: DISCONTINUED | OUTPATIENT
Start: 2021-01-30 | End: 2021-02-02

## 2021-01-30 RX ORDER — SODIUM PHOSPHATE, DIBASIC AND SODIUM PHOSPHATE, MONOBASIC 7; 19 G/133ML; G/133ML
1 ENEMA RECTAL ONCE AS NEEDED
Status: DISCONTINUED | OUTPATIENT
Start: 2021-01-30 | End: 2021-02-02

## 2021-01-30 RX ORDER — MORPHINE SULFATE 2 MG/ML
2 INJECTION, SOLUTION INTRAMUSCULAR; INTRAVENOUS EVERY 2 HOUR PRN
Status: DISCONTINUED | OUTPATIENT
Start: 2021-01-30 | End: 2021-02-02

## 2021-01-30 RX ORDER — MORPHINE SULFATE 2 MG/ML
1 INJECTION, SOLUTION INTRAMUSCULAR; INTRAVENOUS EVERY 2 HOUR PRN
Status: DISCONTINUED | OUTPATIENT
Start: 2021-01-30 | End: 2021-02-02

## 2021-01-30 RX ORDER — HYDROCODONE BITARTRATE AND ACETAMINOPHEN 5; 325 MG/1; MG/1
1 TABLET ORAL EVERY 4 HOURS PRN
Status: DISCONTINUED | OUTPATIENT
Start: 2021-01-30 | End: 2021-02-02

## 2021-01-30 RX ORDER — POLYETHYLENE GLYCOL 3350 17 G/17G
17 POWDER, FOR SOLUTION ORAL DAILY PRN
Status: DISCONTINUED | OUTPATIENT
Start: 2021-01-30 | End: 2021-02-02

## 2021-01-30 RX ORDER — HEPARIN SODIUM 5000 [USP'U]/ML
5000 INJECTION, SOLUTION INTRAVENOUS; SUBCUTANEOUS EVERY 8 HOURS SCHEDULED
Status: DISCONTINUED | OUTPATIENT
Start: 2021-01-30 | End: 2021-01-31

## 2021-01-30 RX ORDER — BISACODYL 10 MG
10 SUPPOSITORY, RECTAL RECTAL
Status: DISCONTINUED | OUTPATIENT
Start: 2021-01-30 | End: 2021-02-02

## 2021-01-30 RX ORDER — POTASSIUM CHLORIDE 20 MEQ/1
40 TABLET, EXTENDED RELEASE ORAL ONCE
Status: COMPLETED | OUTPATIENT
Start: 2021-01-30 | End: 2021-01-30

## 2021-01-30 RX ORDER — MORPHINE SULFATE 4 MG/ML
4 INJECTION, SOLUTION INTRAMUSCULAR; INTRAVENOUS EVERY 2 HOUR PRN
Status: DISCONTINUED | OUTPATIENT
Start: 2021-01-30 | End: 2021-02-02

## 2021-01-30 RX ORDER — ACETAMINOPHEN 325 MG/1
650 TABLET ORAL EVERY 4 HOURS PRN
Status: DISCONTINUED | OUTPATIENT
Start: 2021-01-30 | End: 2021-02-02

## 2021-01-30 RX ORDER — SODIUM CHLORIDE 9 MG/ML
INJECTION, SOLUTION INTRAVENOUS CONTINUOUS
Status: DISCONTINUED | OUTPATIENT
Start: 2021-01-30 | End: 2021-02-01

## 2021-01-30 RX ORDER — DOCUSATE SODIUM 100 MG/1
100 CAPSULE, LIQUID FILLED ORAL 2 TIMES DAILY
Status: DISCONTINUED | OUTPATIENT
Start: 2021-01-30 | End: 2021-02-02

## 2021-01-30 RX ORDER — ACETAMINOPHEN 500 MG
1000 TABLET ORAL EVERY 8 HOURS PRN
COMMUNITY

## 2021-01-30 RX ORDER — HYDROCODONE BITARTRATE AND ACETAMINOPHEN 5; 325 MG/1; MG/1
2 TABLET ORAL EVERY 4 HOURS PRN
Status: DISCONTINUED | OUTPATIENT
Start: 2021-01-30 | End: 2021-02-02

## 2021-01-30 NOTE — ED NOTES
Orders for admission, patient is aware of plan and ready to go upstairs. Any questions, please call ED AIYANA Garcia  at extension [phone].      Type of COVID test sent: Rapid sent yesterday, negative  COVID Suspicion level: Low  Drug(s) infusing: abx (see MAR)

## 2021-01-30 NOTE — ED PROVIDER NOTES
Patient Seen in: [facility] AND [facility] Emergency Department      History   Patient presents with:  Abnormal Result    Stated Complaint: abnormal labs    HPI/Subjective:   HPI    The patient is a 66-year-old female with a history of a ureteral stent placed [employer]    Positive for stated complaint: abnormal labs  Other systems are as noted in HPI. Constitutional and vital signs reviewed. All other systems reviewed and negative except as noted above.     Physical Exam     ED Triage Vitals [01/30/21 0495 72 29 09 Differential diagnosis includes cystitis, bacteremia          ED Course     Labs Reviewed   BASIC METABOLIC PANEL (8) - Abnormal; Notable for the following components:       Result Value    Glucose 100 (*)     Sodium 135 (*)     Potassium 3.1 (*)     C pressure.       Medications Prescribed:  Current Discharge Medication List                          Present on Admission  Date Reviewed: 1/11/2021          ICD-10-CM Noted POA    Bacteremia R78.81 1/30/2021 Unknown

## 2021-01-31 ENCOUNTER — APPOINTMENT (OUTPATIENT)
Dept: CT IMAGING | Facility: HOSPITAL | Age: 71
DRG: 872 | End: 2021-01-31
Attending: HOSPITALIST
Payer: MEDICARE

## 2021-01-31 ENCOUNTER — E-ADVICE (OUTPATIENT)
Dept: CARDIOLOGY | Age: 71
End: 2021-01-31

## 2021-01-31 LAB
ALBUMIN SERPL-MCNC: 2.9 G/DL (ref 3.4–5)
ALBUMIN/GLOB SERPL: 0.8 {RATIO} (ref 1–2)
ALP LIVER SERPL-CCNC: 145 U/L
ALT SERPL-CCNC: 56 U/L
ANION GAP SERPL CALC-SCNC: 10 MMOL/L (ref 0–18)
AST SERPL-CCNC: 42 U/L (ref 15–37)
BASOPHILS # BLD AUTO: 0.03 X10(3) UL (ref 0–0.2)
BASOPHILS NFR BLD AUTO: 0.4 %
BILIRUB SERPL-MCNC: 0.8 MG/DL (ref 0.1–2)
BUN BLD-MCNC: 18 MG/DL (ref 7–18)
BUN/CREAT SERPL: 17 (ref 10–20)
CALCIUM BLD-MCNC: 8.3 MG/DL (ref 8.5–10.1)
CHLORIDE SERPL-SCNC: 107 MMOL/L (ref 98–112)
CO2 SERPL-SCNC: 23 MMOL/L (ref 21–32)
CREAT BLD-MCNC: 1.06 MG/DL
DEPRECATED RDW RBC AUTO: 41.1 FL (ref 35.1–46.3)
EOSINOPHIL # BLD AUTO: 0.03 X10(3) UL (ref 0–0.7)
EOSINOPHIL NFR BLD AUTO: 0.4 %
ERYTHROCYTE [DISTWIDTH] IN BLOOD BY AUTOMATED COUNT: 13.7 % (ref 11–15)
GLOBULIN PLAS-MCNC: 3.8 G/DL (ref 2.8–4.4)
GLUCOSE BLD-MCNC: 91 MG/DL (ref 70–99)
HCT VFR BLD AUTO: 32 %
HGB BLD-MCNC: 10.7 G/DL
IMM GRANULOCYTES # BLD AUTO: 0.03 X10(3) UL (ref 0–1)
IMM GRANULOCYTES NFR BLD: 0.4 %
LYMPHOCYTES # BLD AUTO: 0.84 X10(3) UL (ref 1–4)
LYMPHOCYTES NFR BLD AUTO: 11.1 %
M PROTEIN MFR SERPL ELPH: 6.7 G/DL (ref 6.4–8.2)
MCH RBC QN AUTO: 27.4 PG (ref 26–34)
MCHC RBC AUTO-ENTMCNC: 33.4 G/DL (ref 31–37)
MCV RBC AUTO: 82.1 FL
MONOCYTES # BLD AUTO: 0.92 X10(3) UL (ref 0.1–1)
MONOCYTES NFR BLD AUTO: 12.2 %
NEUTROPHILS # BLD AUTO: 5.71 X10 (3) UL (ref 1.5–7.7)
NEUTROPHILS # BLD AUTO: 5.71 X10(3) UL (ref 1.5–7.7)
NEUTROPHILS NFR BLD AUTO: 75.5 %
OSMOLALITY SERPL CALC.SUM OF ELEC: 291 MOSM/KG (ref 275–295)
PLATELET # BLD AUTO: 178 10(3)UL (ref 150–450)
POTASSIUM SERPL-SCNC: 3.1 MMOL/L (ref 3.5–5.1)
POTASSIUM SERPL-SCNC: 3.6 MMOL/L (ref 3.5–5.1)
RBC # BLD AUTO: 3.9 X10(6)UL
SODIUM SERPL-SCNC: 140 MMOL/L (ref 136–145)
WBC # BLD AUTO: 7.6 X10(3) UL (ref 4–11)

## 2021-01-31 PROCEDURE — 99233 SBSQ HOSP IP/OBS HIGH 50: CPT | Performed by: HOSPITALIST

## 2021-01-31 PROCEDURE — 74176 CT ABD & PELVIS W/O CONTRAST: CPT | Performed by: HOSPITALIST

## 2021-01-31 PROCEDURE — 99223 1ST HOSP IP/OBS HIGH 75: CPT | Performed by: UROLOGY

## 2021-01-31 RX ORDER — ROSUVASTATIN CALCIUM 10 MG/1
10 TABLET, COATED ORAL NIGHTLY
Status: DISCONTINUED | OUTPATIENT
Start: 2021-01-31 | End: 2021-02-02

## 2021-01-31 RX ORDER — POTASSIUM CHLORIDE 14.9 MG/ML
20 INJECTION INTRAVENOUS ONCE
Status: COMPLETED | OUTPATIENT
Start: 2021-01-31 | End: 2021-01-31

## 2021-01-31 RX ORDER — LEVOTHYROXINE SODIUM 0.07 MG/1
75 TABLET ORAL
Status: DISCONTINUED | OUTPATIENT
Start: 2021-01-31 | End: 2021-02-02

## 2021-01-31 RX ORDER — DOXEPIN HYDROCHLORIDE 50 MG/1
1 CAPSULE ORAL DAILY
Status: DISCONTINUED | OUTPATIENT
Start: 2021-01-31 | End: 2021-02-02

## 2021-01-31 RX ORDER — CALCIUM CARBONATE/VITAMIN D3 250-3.125
2 TABLET ORAL 2 TIMES DAILY
Status: DISCONTINUED | OUTPATIENT
Start: 2021-01-31 | End: 2021-02-02

## 2021-01-31 RX ORDER — POTASSIUM CHLORIDE 20 MEQ/1
40 TABLET, EXTENDED RELEASE ORAL ONCE
Status: DISCONTINUED | OUTPATIENT
Start: 2021-01-31 | End: 2021-01-31

## 2021-01-31 RX ORDER — ACETAMINOPHEN 500 MG
500 TABLET ORAL EVERY 6 HOURS PRN
Status: DISCONTINUED | OUTPATIENT
Start: 2021-01-31 | End: 2021-02-02

## 2021-01-31 RX ORDER — POTASSIUM CHLORIDE 20 MEQ/1
40 TABLET, EXTENDED RELEASE ORAL EVERY 4 HOURS
Status: COMPLETED | OUTPATIENT
Start: 2021-01-31 | End: 2021-02-01

## 2021-01-31 NOTE — CONSULTS
[facility]     Report of Consultation    Simon Paula Zavala [address]  [phone]       [facility] Patient Status:  Inpatient    1950 MRN [id number]   Location [facility] 4W/SW/SE Attending Mirlande Noonan MD   [facility] Hyperlipidemia    • Hyperlipidemia    • Hypothyroidism    • Migraines    • Pacemaker    • Pulmonary embolism ([facility] [address]     On patients HX client communicates no PE?     • Visual impairment     glasses     Past Surgical History:   Procedure Laterality Date   • C HYDROcodone-acetaminophen (NORCO) 5-325 MG per tab 1 tablet, 1 tablet, Oral, Q4H PRN **OR** HYDROcodone-acetaminophen (NORCO) 5-325 MG per tab 2 tablet, 2 tablet, Oral, Q4H PRN  •  morphINE sulfate (PF) 2 MG/ML injection 1 mg, 1 mg, Intravenous, Q2H PRN ** rashes or lesions   Lymph nodes:   No lymphadenopathy   Neurologic:   Grossly normal     Laboratory Data:  Lab Results   Component Value Date    WBC 7.6 01/31/2021    HGB 10.7 01/31/2021    .0 01/31/2021    CREATSERUM 1.06 01/31/2021    K 3.1 01/31/ perinephric and periureteric stranding. No hydroureteronephrosis. Mural thickening of urinary bladder. ADRENALS: Normal unenhanced adrenals. LIVER: Normal unenhanced liver. BILIARY: Cholelithiasis. No evidence for cholecystitis or biliary dilatation.  P at 8:20 AM          Xr Chest Ap Portable  (cpt=71045)    Result Date: 1/29/2021  PROCEDURE: XR CHEST AP PORTABLE  (CPT=71045) TIME: 15:42  COMPARISON: [facility], X CHEST PA LAT ROUTINE, 7/28/2014, 6:35 AM.  INDICATIONS: Shortness of breath images. RIGHT SYSTEM: Retrograde contrast injection into the right collecting system reveals focal narrowing of the ureteropelvic junction. Hydronephrosis and pelviectasis are demonstrated.  Subsequent images reveal placement of a right-sided double-J urete

## 2021-01-31 NOTE — CONSULTS
[facility] ID CONSULT NOTE    Gricelda Elizondo Patient Status:  Inpatient    1950 MRN [id number]   Location [facility] 4W/SW/SE Attending Troy Coley MD   Hosp Day # 1 PCP Franck Worthington MD       Reason • H/O complete eye exam 1997   • H/O diagnostic mammography [phone]   • H/O exercise stress test 1995   • High blood pressure    • High cholesterol    • HTN (hypertension)    • Hyperlipidemia    • Hyperlipidemia    • Hypothyroidism    • Migraines    • Pa •  acetaminophen (TYLENOL) tab 650 mg, 650 mg, Oral, Q4H PRN **OR** HYDROcodone-acetaminophen (NORCO) 5-325 MG per tab 1 tablet, 1 tablet, Oral, Q4H PRN **OR** HYDROcodone-acetaminophen (NORCO) 5-325 MG per tab 2 tablet, 2 tablet, Oral, Q4H PRN  •  morphIN HEMATOLOGIC:  No anemia, bleeding or bruising. ALLERGIES:  No history of asthma, hives, eczema or rhinitis. Physical Exam:  Vital signs: Blood pressure 109/52, pulse 60, temperature 99.3 °F (37.4 °C), temperature source Oral, resp.  rate 18, height 4' 1 Pt seen by  Renal for worsening renal fx 10/2020- US ordered which showed marked bladder distention, and R sided hydronephrosis. Seen in ED 11/5 with urinary retention and back pain- colon inserted with some hematuria.  Pt prescribed 7d cefadroxil on 12/20

## 2021-01-31 NOTE — PROGRESS NOTES
[facility]    Progress Note    Parisa Deleon Patient Status:  Inpatient    1950 MRN [id number]   Location [facility] 4W/SW/SE Attending Mahad Carranza MD   Hosp Day # 1 PCP Justin Jay MD       Subjective:   Kevyn Lozano Intravenous Q8H   • DAPTOmycin  300 mg Intravenous Q24H       Current PRN Inpatient Meds:      acetaminophen, acetaminophen **OR** HYDROcodone-acetaminophen **OR** HYDROcodone-acetaminophen, morphINE sulfate **OR** morphINE sulfate **OR** morphINE sulfate, calcification. 6. Small hiatal hernia. 7. 5 x 3 mm (4 mm mean diameter) peripheral left lower lobe pulmonary nodule probably represents a postinflammatory nodule and or a impacted distal bronchiole. 8.  L5-S1:  Grade 2 spondylolisthesis related to bilateral

## 2021-01-31 NOTE — PROGRESS NOTES
Calcium Carb-Cholecalciferol 1000-800 MG-UNIT TABS bid  is Non-Formulary Medication &  Auto-Substituted to Calcium Carbonate/Vitamin D 250-125 unit tab 2 tablets bid  Per P&T PROTOCOL

## 2021-01-31 NOTE — H&P
[facility]    PATIENT'S NAME: Aaliyah Hdzalice PHYSICIAN: Matias Kelley MD   PATIENT ACCOUNT#:   [id number]    LOCATION:  [address] [place] [address] RECORD #:   [id number]       YOB: 1950  ADMISSION DATE:       01/3 cytology the right ureter on 01/05/2020, no malignancy was identified. The patient states that she initially did relatively well and she did have some hematuria and her Xarelto dose was decreased to every other day for a short time.   She is now back on a History of PE in the past per medical record, although patient does not recall this. Recurrent urinary tract infection, previous was E coli, recurrent UTI is enterococcus. Dyslipidemia. Hypothyroidism. Hypertension. Atrial fibrillation. Arthritis.  St episode of diarrhea on the day of admission. The patient states the stool started out liquid and then became firm after that. She denies any melena. She does have a hemorrhoid which bleeds at time.   There are otherwise no additional pertinent positives protocol revealed right nephroureteral stent in appropriate position. No right hydronephrosis. Mild right perinephric and moderate periureteral stranding present.   Partially collapsed, diffusely thickened and abnormal urinary bladder suspicious for cysti

## 2021-01-31 NOTE — PLAN OF CARE
Vital signs stable. Patient is alert and oriented x4. Pain managed with no pharmacologic intervention. Pt denies pain. . Patient's activity/mobility level is independent. Patient is voiding via self straight cath. IVF and Zosyn running. Patient remains NPO. management  - Manage/alleviate anxiety  - Utilize distraction and/or relaxation techniques  - Monitor for opioid side effects  - Notify MD/LIP if interventions unsuccessful or patient reports new pain  - Anticipate increased pain with activity and pre-medi physician/LIP order or complex needs related to functional status, cognitive ability or social support system  Outcome: Progressing     Problem: GENITOURINARY - ADULT  Goal: Absence of urinary retention  Description: INTERVENTIONS:  - Assess patient’s abil

## 2021-01-31 NOTE — PROGRESS NOTES
ADMISSION NOTE    65year old female s/p placement of a ureteral stent on 1/5. Presented to ED yesterday with fever chills started on Keflex.   Blood cultures drawn yesterday positive for enterococcus  Patient continued to be febrile and was called to retu

## 2021-01-31 NOTE — ED PROVIDER NOTES
Patient Seen in: [facility] AND [facility] Emergency Department      History   Patient presents with:   Body ache and/or chills    Stated Complaint: chills    HPI/Subjective:   HPI    65year old female with h/o atrial fibrillation on xarelto, thyroid disorder, h chills  Other systems are as noted in HPI. Constitutional and vital signs reviewed. All other systems reviewed and negative except as noted above.     Physical Exam     ED Triage Vitals   BP 01/29/21 1149 123/76   Pulse 01/29/21 1149 94   Resp 01/29/2 (cpt=71045)    Result Date: 1/29/2021  CONCLUSION:  1. Stable findings from July 28, 2014. 2. Atherosclerosis. 3. Pacemaker. 4. Demineralization. 5. Scoliosis. 6. Osteoarthritis. Dictated by (CST):  Carole Saucedo MD on 1/29/2021 at 4:06 PM     Ashley Garza

## 2021-02-01 ENCOUNTER — APPOINTMENT (OUTPATIENT)
Dept: CV DIAGNOSTICS | Facility: HOSPITAL | Age: 71
DRG: 872 | End: 2021-02-01
Attending: HOSPITALIST
Payer: MEDICARE

## 2021-02-01 LAB
ANION GAP SERPL CALC-SCNC: 7 MMOL/L (ref 0–18)
BASOPHILS # BLD AUTO: 0.02 X10(3) UL (ref 0–0.2)
BASOPHILS NFR BLD AUTO: 0.4 %
BUN BLD-MCNC: 12 MG/DL (ref 7–18)
BUN/CREAT SERPL: 14.1 (ref 10–20)
C DIFF TOX B STL QL: NEGATIVE
CALCIUM BLD-MCNC: 8.5 MG/DL (ref 8.5–10.1)
CHLORIDE SERPL-SCNC: 112 MMOL/L (ref 98–112)
CO2 SERPL-SCNC: 21 MMOL/L (ref 21–32)
CREAT BLD-MCNC: 0.85 MG/DL
DEPRECATED RDW RBC AUTO: 42.7 FL (ref 35.1–46.3)
EOSINOPHIL # BLD AUTO: 0.15 X10(3) UL (ref 0–0.7)
EOSINOPHIL NFR BLD AUTO: 2.9 %
ERYTHROCYTE [DISTWIDTH] IN BLOOD BY AUTOMATED COUNT: 13.8 % (ref 11–15)
GLUCOSE BLD-MCNC: 86 MG/DL (ref 70–99)
HCT VFR BLD AUTO: 31.7 %
HGB BLD-MCNC: 10.3 G/DL
IMM GRANULOCYTES # BLD AUTO: 0.01 X10(3) UL (ref 0–1)
IMM GRANULOCYTES NFR BLD: 0.2 %
LYMPHOCYTES # BLD AUTO: 0.84 X10(3) UL (ref 1–4)
LYMPHOCYTES NFR BLD AUTO: 16.1 %
MCH RBC QN AUTO: 27.3 PG (ref 26–34)
MCHC RBC AUTO-ENTMCNC: 32.5 G/DL (ref 31–37)
MCV RBC AUTO: 84.1 FL
MONOCYTES # BLD AUTO: 0.56 X10(3) UL (ref 0.1–1)
MONOCYTES NFR BLD AUTO: 10.7 %
NEUTROPHILS # BLD AUTO: 3.64 X10 (3) UL (ref 1.5–7.7)
NEUTROPHILS # BLD AUTO: 3.64 X10(3) UL (ref 1.5–7.7)
NEUTROPHILS NFR BLD AUTO: 69.7 %
OSMOLALITY SERPL CALC.SUM OF ELEC: 289 MOSM/KG (ref 275–295)
PLATELET # BLD AUTO: 188 10(3)UL (ref 150–450)
POTASSIUM SERPL-SCNC: 4.7 MMOL/L (ref 3.5–5.1)
POTASSIUM SERPL-SCNC: 4.8 MMOL/L (ref 3.5–5.1)
RBC # BLD AUTO: 3.77 X10(6)UL
SODIUM SERPL-SCNC: 140 MMOL/L (ref 136–145)
WBC # BLD AUTO: 5.2 X10(3) UL (ref 4–11)

## 2021-02-01 PROCEDURE — 99233 SBSQ HOSP IP/OBS HIGH 50: CPT | Performed by: HOSPITALIST

## 2021-02-01 PROCEDURE — 93306 TTE W/DOPPLER COMPLETE: CPT | Performed by: HOSPITALIST

## 2021-02-01 PROCEDURE — 99231 SBSQ HOSP IP/OBS SF/LOW 25: CPT | Performed by: NURSE PRACTITIONER

## 2021-02-01 RX ORDER — SODIUM CHLORIDE 9 MG/ML
INJECTION, SOLUTION INTRAVENOUS
Status: COMPLETED | OUTPATIENT
Start: 2021-02-02 | End: 2021-02-02

## 2021-02-01 NOTE — PLAN OF CARE
Patient alert and oriented. IV fluids stopped by MD. IV abx changed per ID to Ampicillin. C.diff sample negative. 2D echo completed. Lawson catheter intact and draining for neurogenic bladder. Plan for ELIEZER tomorrow and NPO at midnight.  Working with social management  - Manage/alleviate anxiety  - Utilize distraction and/or relaxation techniques  - Monitor for opioid side effects  - Notify MD/LIP if interventions unsuccessful or patient reports new pain  - Anticipate increased pain with activity and pre-medi physician/LIP order or complex needs related to functional status, cognitive ability or social support system  Outcome: Progressing     Problem: GENITOURINARY - ADULT  Goal: Absence of urinary retention  Description: INTERVENTIONS:  - Assess patient’s abil

## 2021-02-01 NOTE — PROGRESS NOTES
INFECTIOUS DISEASE PROGRESS NOTE  [facility]ESS [facility]    Katherine Orosco Patient Status:  Inpatient    1950 MRN [id number]   Location [facility] 4W/SW/SE Attending Linsey José MD   Hosp Day # 2 PC Pt seen by  Renal for worsening renal fx 10/2020- US ordered which showed marked bladder distention, and R sided hydronephrosis. Seen in ED 11/5 with urinary retention and back pain- colon inserted with some hematuria.  Pt prescribed 7d cefadroxil on 12/20

## 2021-02-01 NOTE — CM/SW NOTE
2/1: Received call from [facility] w/ [facility] regarding likely need for IV abx & is approved/accepted for in office or home IV abx. Met with patient for assessment. Patient and  Shira Underwood live in a bilevel home w/ 6 stairs.  Patient is independent with ADLs/IA

## 2021-02-01 NOTE — PLAN OF CARE
Vital signs stable. Patient is alert and oriented x4. Pain managed with no pharmacologic intervention. Pt denies pain. Patient's activity/mobility level is independent. Patient is voiding with colon. Tolerating general diet; no c/o N/V.  IVF and abx running management  - Manage/alleviate anxiety  - Utilize distraction and/or relaxation techniques  - Monitor for opioid side effects  - Notify MD/LIP if interventions unsuccessful or patient reports new pain  - Anticipate increased pain with activity and pre-medi physician/LIP order or complex needs related to functional status, cognitive ability or social support system  Outcome: Progressing     Problem: GENITOURINARY - ADULT  Goal: Absence of urinary retention  Description: INTERVENTIONS:  - Assess patient’s abil

## 2021-02-01 NOTE — PLAN OF CARE
Tolerating meals, colon inserted, blood cultures pending, potassium replaced, iv abx, up to chair and ambulating in room, no c/o pain or nausea, plan for echo tomorrow.     Problem: Patient Centered Care  Goal: Patient preferences are identified and Marco Antonio Hough pre-medicate as appropriate  Outcome: Progressing     Problem: RISK FOR INFECTION - ADULT  Goal: Absence of fever/infection during anticipated neutropenic period  Description: INTERVENTIONS  - Monitor WBC  - Administer growth factors as ordered  - Keiko ability to void and empty bladder  - Monitor intake/output and perform bladder scan as needed  - Follow urinary retention protocol/standard of care  - Consider collaborating with pharmacy to review patient's medication profile  - Implement strategies to pr

## 2021-02-01 NOTE — PROGRESS NOTES
[facility]    Progress Note    Mark Mckeon Patient Status:  Inpatient    1950 MRN [id number]   Location [facility] 4W/SW/SE Attending Ebonie Padilla MD   Hosp Day # 2 PCP Rain Acuna MD       Subjective:   Zoila Hansen Current PRN Inpatient Meds:      acetaminophen, acetaminophen **OR** HYDROcodone-acetaminophen **OR** HYDROcodone-acetaminophen, morphINE sulfate **OR** morphINE sulfate **OR** morphINE sulfate, PEG 3350, magnesium hydroxide, bisacodyl, Fleet Enema, Thick-walled urinary bladder similar to that seen previously either representing chronic cystitis or neurogenic bladder. 3. Cholelithiasis without cholecystitis. 4. Uncomplicated colonic and duodenal diverticular disease.  5. Generalized atherosclerotic vas midnight's based on the clinical documentation in H+P. Based on patients current state of illness, I anticipate that, after discharge, patient will require TBD.

## 2021-02-01 NOTE — PROGRESS NOTES
[facility]  Urology Consult Follow-Up Note    Juanis Crain Patient Status:  Inpatient    1950 MRN [id number]   Location [facility] 4W/SW/SE Attending Jackie Lewis MD   Hosp Day # 2 PCP Thanh Solorzano cholecystitis. 4. Uncomplicated colonic and duodenal diverticular disease. 5. Generalized atherosclerotic vascular calcification including coronary artery calcification. 6. Small hiatal hernia.  7. 5 x 3 mm (4 mm mean diameter) peripheral left lower lobe pu

## 2021-02-01 NOTE — CM/SW NOTE
BPCI Bundled Advanced Medicare Program    Plan of care reviewed for care coordination and discharge planning. Noted pt falls under  BPCI/Medicare program, with DRG Sepsis [phone], W)    [address] Proposal:  Home.   Pt discharging with IV Abx and prefers OP MID

## 2021-02-02 ENCOUNTER — APPOINTMENT (OUTPATIENT)
Dept: CV DIAGNOSTICS | Facility: HOSPITAL | Age: 71
DRG: 872 | End: 2021-02-02
Attending: NURSE PRACTITIONER
Payer: MEDICARE

## 2021-02-02 ENCOUNTER — APPOINTMENT (OUTPATIENT)
Dept: INTERVENTIONAL RADIOLOGY/VASCULAR | Facility: HOSPITAL | Age: 71
DRG: 872 | End: 2021-02-02
Attending: NURSE PRACTITIONER
Payer: MEDICARE

## 2021-02-02 ENCOUNTER — APPOINTMENT (OUTPATIENT)
Dept: PICC SERVICES | Facility: HOSPITAL | Age: 71
DRG: 872 | End: 2021-02-02
Attending: PHYSICIAN ASSISTANT
Payer: MEDICARE

## 2021-02-02 VITALS
DIASTOLIC BLOOD PRESSURE: 79 MMHG | BODY MASS INDEX: 27.21 KG/M2 | SYSTOLIC BLOOD PRESSURE: 131 MMHG | OXYGEN SATURATION: 98 % | RESPIRATION RATE: 18 BRPM | HEART RATE: 60 BPM | TEMPERATURE: 98 F | WEIGHT: 135 LBS | HEIGHT: 59 IN

## 2021-02-02 LAB
ALBUMIN SERPL-MCNC: 2.5 G/DL (ref 3.4–5)
ALBUMIN/GLOB SERPL: 0.7 {RATIO} (ref 1–2)
ALP LIVER SERPL-CCNC: 182 U/L
ALT SERPL-CCNC: 90 U/L
ANION GAP SERPL CALC-SCNC: 4 MMOL/L (ref 0–18)
AST SERPL-CCNC: 58 U/L (ref 15–37)
BASOPHILS # BLD AUTO: 0.03 X10(3) UL (ref 0–0.2)
BASOPHILS NFR BLD AUTO: 0.6 %
BILIRUB SERPL-MCNC: 0.3 MG/DL (ref 0.1–2)
BUN BLD-MCNC: 10 MG/DL (ref 7–18)
BUN/CREAT SERPL: 12.2 (ref 10–20)
CALCIUM BLD-MCNC: 8.4 MG/DL (ref 8.5–10.1)
CHLORIDE SERPL-SCNC: 113 MMOL/L (ref 98–112)
CK SERPL-CCNC: 75 U/L
CO2 SERPL-SCNC: 25 MMOL/L (ref 21–32)
CREAT BLD-MCNC: 0.82 MG/DL
DEPRECATED RDW RBC AUTO: 42.8 FL (ref 35.1–46.3)
EOSINOPHIL # BLD AUTO: 0.24 X10(3) UL (ref 0–0.7)
EOSINOPHIL NFR BLD AUTO: 4.9 %
ERYTHROCYTE [DISTWIDTH] IN BLOOD BY AUTOMATED COUNT: 13.9 % (ref 11–15)
GLOBULIN PLAS-MCNC: 3.6 G/DL (ref 2.8–4.4)
GLUCOSE BLD-MCNC: 92 MG/DL (ref 70–99)
HCT VFR BLD AUTO: 31.6 %
HGB BLD-MCNC: 10.1 G/DL
IMM GRANULOCYTES # BLD AUTO: 0.03 X10(3) UL (ref 0–1)
IMM GRANULOCYTES NFR BLD: 0.6 %
LYMPHOCYTES # BLD AUTO: 0.96 X10(3) UL (ref 1–4)
LYMPHOCYTES NFR BLD AUTO: 19.4 %
M PROTEIN MFR SERPL ELPH: 6.1 G/DL (ref 6.4–8.2)
MCH RBC QN AUTO: 26.8 PG (ref 26–34)
MCHC RBC AUTO-ENTMCNC: 32 G/DL (ref 31–37)
MCV RBC AUTO: 83.8 FL
MONOCYTES # BLD AUTO: 0.41 X10(3) UL (ref 0.1–1)
MONOCYTES NFR BLD AUTO: 8.3 %
NEUTROPHILS # BLD AUTO: 3.27 X10 (3) UL (ref 1.5–7.7)
NEUTROPHILS # BLD AUTO: 3.27 X10(3) UL (ref 1.5–7.7)
NEUTROPHILS NFR BLD AUTO: 66.2 %
OSMOLALITY SERPL CALC.SUM OF ELEC: 293 MOSM/KG (ref 275–295)
PLATELET # BLD AUTO: 187 10(3)UL (ref 150–450)
POTASSIUM SERPL-SCNC: 4.3 MMOL/L (ref 3.5–5.1)
RBC # BLD AUTO: 3.77 X10(6)UL
SODIUM SERPL-SCNC: 142 MMOL/L (ref 136–145)
WBC # BLD AUTO: 4.9 X10(3) UL (ref 4–11)

## 2021-02-02 PROCEDURE — 93325 DOPPLER ECHO COLOR FLOW MAPG: CPT | Performed by: STUDENT IN AN ORGANIZED HEALTH CARE EDUCATION/TRAINING PROGRAM

## 2021-02-02 PROCEDURE — 93320 DOPPLER ECHO COMPLETE: CPT | Performed by: NURSE PRACTITIONER

## 2021-02-02 PROCEDURE — 93312 ECHO TRANSESOPHAGEAL: CPT | Performed by: STUDENT IN AN ORGANIZED HEALTH CARE EDUCATION/TRAINING PROGRAM

## 2021-02-02 PROCEDURE — 93320 DOPPLER ECHO COMPLETE: CPT | Performed by: STUDENT IN AN ORGANIZED HEALTH CARE EDUCATION/TRAINING PROGRAM

## 2021-02-02 PROCEDURE — 93325 DOPPLER ECHO COLOR FLOW MAPG: CPT | Performed by: NURSE PRACTITIONER

## 2021-02-02 PROCEDURE — 99231 SBSQ HOSP IP/OBS SF/LOW 25: CPT | Performed by: NURSE PRACTITIONER

## 2021-02-02 PROCEDURE — 99239 HOSP IP/OBS DSCHRG MGMT >30: CPT | Performed by: NURSE PRACTITIONER

## 2021-02-02 RX ORDER — MIDAZOLAM HYDROCHLORIDE 1 MG/ML
INJECTION INTRAMUSCULAR; INTRAVENOUS
Status: COMPLETED
Start: 2021-02-02 | End: 2021-02-02

## 2021-02-02 RX ORDER — LIDOCAINE HYDROCHLORIDE 10 MG/ML
5 INJECTION, SOLUTION EPIDURAL; INFILTRATION; INTRACAUDAL; PERINEURAL ONCE
Status: DISCONTINUED | OUTPATIENT
Start: 2021-02-02 | End: 2021-02-02

## 2021-02-02 RX ORDER — LIDOCAINE HYDROCHLORIDE 20 MG/ML
SOLUTION OROPHARYNGEAL
Status: COMPLETED
Start: 2021-02-02 | End: 2021-02-02

## 2021-02-02 NOTE — DISCHARGE SUMMARY
[facility]    Discharge Summary    Ana Varghese Patient Status:  Inpatient    1950 MRN [id number]   Location [facility] 4W/SW/SE Attending Santiago Holden MD   Hosp Day # 3 PCP Richelle Spence MD     Date of Admission: which revealed moderately trabeculated moderately trabeculated bladder with multiple small diverticula. No reflux.   A CT urogram with and without in November of last year also revealed chronic right hydroureteronephrosis without visible obstructing calcul count was repeated after she returned to the emergency room on January 30 and it was 8.8 with a hemoglobin of 12 and a platelet count of 189,279. There were 83% neutrophils.   Glucose 100, sodium 135, potassium 3.1, chloride 100, CO2 of 25, BUN of 18 with [facility]             Ruchi Quintero MD In 1 week.     Specialty: NEPHROLOGY  Contact information:  [address] [phone]                     Discharge Condition: Stable    Discharge Medications:      Discharge M IMITREX      TAKE 1 TABLET (50 MG TOTAL) BY MOUTH EVERY 2 (TWO) HOURS AS NEEDED FOR MIGRAINE. Quantity: 9 tablet  Refills: 2     Vitamin D3 50 MCG (2000 UT) Caps  Commonly known as: VITAMIN D3      Take 2,000 Units by mouth nightly.    Refills: 0     Harrison Bis

## 2021-02-02 NOTE — PROGRESS NOTES
Vascular Access Note    Vascular Access Screening:   Allergies to Lidocaine: no  Allergies to Latex: no  Presence of Pacemaker/Defibrillator: Left Side  Mastectomy with Lymph Node Dissection: No  AV Fistula / AV Graft: No  Dialysis Catheter: No  [place]

## 2021-02-02 NOTE — IVS NOTE
ELIEZER Done by Dr. Sybil Gregory. Report given to [employer]. PT is alert and awake with VSS.

## 2021-02-02 NOTE — PROGRESS NOTES
INFECTIOUS DISEASE PROGRESS NOTE  [facility]    Sander Luz Patient Status:  Inpatient    1950 MRN [id number]   Location [facility] 4W/SW/SE Attending Gayle Eddy MD   Hosp Day # 3 PC Pt seen by  Renal for worsening renal fx 10/2020- US ordered which showed marked bladder distention, and R sided hydronephrosis. Seen in ED 11/5 with urinary retention and back pain- colon inserted with some hematuria.  Pt prescribed 7d cefadroxil on 12/20

## 2021-02-02 NOTE — PLAN OF CARE
Patient alert and oriented, on room air. Patient denies pain, nausea or vomiting. ELIEZER completed. Patient independent and up ad yordan. IV abx continued. Lawson catheter intact and draining.  Plan to discharge home with home health and [facility]  For abx administrati Utilize distraction and/or relaxation techniques  - Monitor for opioid side effects  - Notify MD/LIP if interventions unsuccessful or patient reports new pain  - Anticipate increased pain with activity and pre-medicate as appropriate  Outcome: Progressing functional status, cognitive ability or social support system  Outcome: Progressing     Problem: GENITOURINARY - ADULT  Goal: Absence of urinary retention  Description: INTERVENTIONS:  - Assess patient’s ability to void and empty bladder  - Monitor intake/

## 2021-02-02 NOTE — PROGRESS NOTES
[address]  Urology Consult Follow-Up Note    Stacy Dial Patient Status:  Inpatient    1950 MRN [id number]   Location [facility] 4W/SW/SE Attending Puneet Medrano MD   Hosp Day # 3 PCP Jax Nieto balloon dilation of right ureteral stricture, brushings of ureteral stricture for cytology, balloon dilation of stricture, insertion of right ureteral stent. Urine cytology was benign.   At baseline she performs CIC four times daily, currently has colon ca

## 2021-02-02 NOTE — PLAN OF CARE
Problem: Patient Centered Care  Goal: Patient preferences are identified and integrated in the patient's plan of care  Description: Interventions:  - What would you like us to know as we care for you? My stent is supposed to be removed soon.   - Provide t INTERVENTIONS  - Monitor WBC  - Administer growth factors as ordered  - Implement neutropenic guidelines  Outcome: Progressing     Problem: SAFETY ADULT - FALL  Goal: Free from fall injury  Description: INTERVENTIONS:  - Assess pt frequently for physical n with pharmacy to review patient's medication profile  - Implement strategies to promote bladder emptying  Outcome: Progressing     Patient alert and oriented x 4. Vitals stable. Pacemaker in place. NPO since midnight. Lawson in place.  Denies pain and nausea

## 2021-02-02 NOTE — PLAN OF CARE
Discharge orders received from medical, urology, and ID. Discharge instructions printed and discussed with patient. Follow up appointments discussed and patient understands appointment scheduled with [facility] tomorrow at 2:15pm in [facility].  Lawson catheter di Encourage pt to monitor pain and request assistance  - Assess pain using appropriate pain scale  - Administer analgesics based on type and severity of pain and evaluate response  - Implement non-pharmacological measures as appropriate and evaluate response Identify barriers to discharge w/pt and caregiver  - Include patient/family/discharge partner in discharge planning  - Arrange for needed discharge resources and transportation as appropriate  - Identify discharge learning needs (meds, wound care, etc)  -

## 2021-02-03 ENCOUNTER — TELEPHONE (OUTPATIENT)
Dept: SURGERY | Facility: CLINIC | Age: 71
End: 2021-02-03

## 2021-02-03 ENCOUNTER — PATIENT OUTREACH (OUTPATIENT)
Dept: CASE MANAGEMENT | Age: 71
End: 2021-02-03

## 2021-02-03 DIAGNOSIS — R78.81 BACTEREMIA: ICD-10-CM

## 2021-02-03 DIAGNOSIS — N13.5 URETERAL STRICTURE, RIGHT: ICD-10-CM

## 2021-02-03 DIAGNOSIS — Z02.9 ENCOUNTERS FOR UNSPECIFIED ADMINISTRATIVE PURPOSE: ICD-10-CM

## 2021-02-03 LAB — ENTEROCOCCUS NO VANA/B: DETECTED

## 2021-02-03 PROCEDURE — 1111F DSCHRG MED/CURRENT MED MERGE: CPT

## 2021-02-03 NOTE — PROGRESS NOTES
Attempted to contact pt for TCM however no answer. Call continued to ring and did not go to a VM. Attempted to call home phone and cell phone, both calls rang and then disconnected. NCM to try again at a later time.

## 2021-02-03 NOTE — TELEPHONE ENCOUNTER
Patient needs f/u with ROBIN or LEOPOLDO to discuss possible ureteral stent removal.  She is a [facility] patient recently hospitalized with urosepsis. She is on IV antibiotics until 2/14 and may need stent removal prior to this time.   If possible she should be seen Brodie Republic

## 2021-02-04 ENCOUNTER — TELEPHONE (OUTPATIENT)
Dept: NEPHROLOGY | Facility: CLINIC | Age: 71
End: 2021-02-04

## 2021-02-04 NOTE — TELEPHONE ENCOUNTER
Dr. Bijal Chin-  Pt has upcoming appointment hospital follow up scheduled 2/10/21. Is it okay to switch appointment to TCM/HFU as requested below?  Thanks, Samantha Lau

## 2021-02-04 NOTE — PROGRESS NOTES
Initial Post Discharge Follow Up   Discharge Date: 2/2/21  Contact Date: 2/3/2021    Consent Verification:  Assessment Completed With: Patient  HIPAA Verified?   Yes    Discharge Dx:    Amp-S E. faecalis bacteremia     Was TCC ordered: no       General: • acetaminophen 500 MG Oral Tab Take 1,000 mg by mouth every 8 (eight) hours as needed for Pain or Fever. • LISINOPRIL-HYDROCHLOROTHIAZIDE 20-12.5 MG Oral Tab TAKE 1 TABLET BY MOUTH EVERY DAY 90 tablet 1   • Methenamine Hippurate 1 g Oral Tab Take 0. 2/3/2021 and will go daily x 14 days. [facility] to provide PICC line care and dressing changes. Referrals/orders at D/C:  Home Health/Services ordered at D/C? No     DME ordered at D/C? No    Needs post D/C:   Now that you are home, are there any needs or co and or orders sent to PCP. For patients with TCC appointments:     NCM offered sooner TCC appointment if schedule allowed: NA    []  Advised patient to bring all medications and blood glucose meter/supplies if applicable.

## 2021-02-04 NOTE — TELEPHONE ENCOUNTER
Spoke to pt for TCM today. Pt has an HFU appt scheduled on 2/10/2021 at 3:40 pm, for a hospital follow up, not scheduled as a TCM HFU. TCM/HFU appt recommended. Please advise.     BOOK BY DATE (last date for TCM): 2/16/2021    TRIAGE: Please inform Dr. Jett Ruiz

## 2021-02-04 NOTE — TELEPHONE ENCOUNTER
Called patient scheduled cysto, right stent removal Tuesday 02/10/2021@ 8am, patient verbalized understanding.

## 2021-02-05 ENCOUNTER — PATIENT MESSAGE (OUTPATIENT)
Dept: NEPHROLOGY | Facility: CLINIC | Age: 71
End: 2021-02-05

## 2021-02-05 NOTE — TELEPHONE ENCOUNTER
Discussed Dr. Park Most note with patient. Pt reports pain in lower back similar to back issues she's had in the past. Declines making appointment with Dr. Judson Martinez. She states she will call Urology. Advised her to call us back if further issues.

## 2021-02-05 NOTE — TELEPHONE ENCOUNTER
Pt had right ureteral stent placed 1/5/21. Stent removal scheduled 2/10/21. Has F/U appointment with Dr. Ramonia Kayser 2/10/21. Patient c/o pain 8/10 in right side of back and right hip.  States it \"feels like sciatica pain\" States it started yesterday and

## 2021-02-05 NOTE — TELEPHONE ENCOUNTER
From: Jarvis Grant  To: Courtney Yip MD  Sent: 2/5/2021 7:57 AM CST  Subject: Other    Having severe pain in right hip and lower back. Started yesterday afternoon. Thought it was usual back pain but could it be from the stent?  I took two Tylenol las

## 2021-02-05 NOTE — TELEPHONE ENCOUNTER
If her pain really starts in the right flank and radiates to the right hip and groin region this could be the stent. Agrees she needs to talk to urology. Otherwise would need to be seen.

## 2021-02-09 ENCOUNTER — PROCEDURE (OUTPATIENT)
Dept: SURGERY | Facility: CLINIC | Age: 71
End: 2021-02-09
Payer: MEDICARE

## 2021-02-09 VITALS
SYSTOLIC BLOOD PRESSURE: 130 MMHG | HEART RATE: 80 BPM | RESPIRATION RATE: 16 BRPM | HEIGHT: 59 IN | WEIGHT: 135 LBS | DIASTOLIC BLOOD PRESSURE: 70 MMHG | BODY MASS INDEX: 27.21 KG/M2

## 2021-02-09 DIAGNOSIS — N13.30 HYDRONEPHROSIS, UNSPECIFIED HYDRONEPHROSIS TYPE: Primary | ICD-10-CM

## 2021-02-09 DIAGNOSIS — R33.9 URINARY RETENTION: ICD-10-CM

## 2021-02-09 DIAGNOSIS — N31.9 NEUROGENIC BLADDER: ICD-10-CM

## 2021-02-09 PROCEDURE — 99213 OFFICE O/P EST LOW 20 MIN: CPT | Performed by: UROLOGY

## 2021-02-09 PROCEDURE — 52310 CYSTOSCOPY AND TREATMENT: CPT | Performed by: UROLOGY

## 2021-02-09 NOTE — PROGRESS NOTES
Rae Mancini is a 65year old female.     HPI:   Patient presents with:  Kidney Problem: cystoscopy with right stent removal       38-year-old female status post cystoscopy, ureteroscopy, balloon dilation of a right-sided ureteral stricture and ureteral surgery   • OTHER Right     elbow surgery   • OTHER Right     ankle surgery   • PACEMAKER        Family History   Problem Relation Age of Onset   • Breast Cancer Other 29   • Heart Disease Father    • Lipids Father         HYPERLIPIDEMIA   • Hypertension F (MULTIVITAMIN OR) Take 1 tablet by mouth nightly. Allergies:    Levaquin [Levofloxa*    HIVES  Vancomycin              HIVES, OTHER (SEE COMMENTS)      ROS:       PHYSICAL EXAM:   Jarvis Grant  : 1950  Referring Physician: No ref.  pro

## 2021-02-10 ENCOUNTER — OFFICE VISIT (OUTPATIENT)
Dept: NEPHROLOGY | Facility: CLINIC | Age: 71
End: 2021-02-10
Payer: MEDICARE

## 2021-02-10 VITALS
DIASTOLIC BLOOD PRESSURE: 72 MMHG | WEIGHT: 138 LBS | SYSTOLIC BLOOD PRESSURE: 125 MMHG | BODY MASS INDEX: 27.82 KG/M2 | HEART RATE: 83 BPM | HEIGHT: 59 IN

## 2021-02-10 DIAGNOSIS — R79.89 ELEVATED LIVER FUNCTION TESTS: ICD-10-CM

## 2021-02-10 DIAGNOSIS — I10 ESSENTIAL HYPERTENSION: ICD-10-CM

## 2021-02-10 DIAGNOSIS — R78.81 BACTEREMIA: Primary | ICD-10-CM

## 2021-02-10 DIAGNOSIS — D64.9 ANEMIA, UNSPECIFIED TYPE: ICD-10-CM

## 2021-02-10 PROCEDURE — 1111F DSCHRG MED/CURRENT MED MERGE: CPT | Performed by: INTERNAL MEDICINE

## 2021-02-10 PROCEDURE — 99215 OFFICE O/P EST HI 40 MIN: CPT | Performed by: INTERNAL MEDICINE

## 2021-02-10 RX ORDER — LINEZOLID 600 MG/1
600 TABLET, FILM COATED ORAL EVERY 12 HOURS
COMMUNITY

## 2021-02-11 ENCOUNTER — TELEPHONE (OUTPATIENT)
Dept: SURGERY | Facility: CLINIC | Age: 71
End: 2021-02-11

## 2021-02-11 DIAGNOSIS — R78.81 BACTEREMIA: Primary | ICD-10-CM

## 2021-02-11 NOTE — PROGRESS NOTES
[facility]  Nephrology Daily Progress Note    Mark Mckeon  [id number]  65year old  Patient presents with:  Hospital F/U: hospital follow up, had ureteral stent removed yesterday      HPI:   Mark Mckeon is a 65year old female.   25-year-old bruising  Integumentary:  Negative for pruritus and rash  Musculoskeletal:  Negative for bone/joint symptoms  Neurological:  Negative for gait disturbance  Psychiatric:  Negative for inappropriate interaction and psychiatric symptoms  Respiratory:  Negativ 0. 82   CA 9.2   < > 8.4*   ALB 3.6   < > 2.5*   PHOS 2.4*  --   --    BUNCREA 15.3   < > 12.2   ANIONGAP 7   < > 4   OSMOCALC 286   < > 293   GFRNAA 46*   < > 72   GFRAA 53*   < > 83    < > = values in this interval not displayed.        Imaging  No results Take 1 tablet daily by mouth.  (Patient not taking:  ), Disp: 30 tablet, Rfl: 3    Allergies:    Levaquin [Levofloxa*    HIVES  Vancomycin              HIVES, OTHER (SEE COMMENTS)         ASSESSMENT/PLAN:   Assessment   Bacteremia  (primary encounter diagno

## 2021-02-12 ENCOUNTER — HOSPITAL ENCOUNTER (EMERGENCY)
Facility: HOSPITAL | Age: 71
Discharge: HOME | End: 2021-02-12
Attending: EMERGENCY MEDICINE
Payer: MEDICARE

## 2021-02-12 VITALS
OXYGEN SATURATION: 97 % | TEMPERATURE: 98 F | HEART RATE: 86 BPM | RESPIRATION RATE: 20 BRPM | DIASTOLIC BLOOD PRESSURE: 70 MMHG | SYSTOLIC BLOOD PRESSURE: 121 MMHG

## 2021-02-12 DIAGNOSIS — M62.82 NON-TRAUMATIC RHABDOMYOLYSIS: Primary | ICD-10-CM

## 2021-02-12 LAB
ANION GAP SERPL CALC-SCNC: 5 MMOL/L (ref 0–18)
BASOPHILS # BLD AUTO: 0.03 X10(3) UL (ref 0–0.2)
BASOPHILS NFR BLD AUTO: 0.5 %
BUN BLD-MCNC: 13 MG/DL (ref 7–18)
BUN/CREAT SERPL: 12.4 (ref 10–20)
CALCIUM BLD-MCNC: 9.5 MG/DL (ref 8.5–10.1)
CHLORIDE SERPL-SCNC: 104 MMOL/L (ref 98–112)
CK SERPL-CCNC: ABNORMAL U/L
CO2 SERPL-SCNC: 29 MMOL/L (ref 21–32)
CREAT BLD-MCNC: 1.05 MG/DL
DEPRECATED RDW RBC AUTO: 43.8 FL (ref 35.1–46.3)
EOSINOPHIL # BLD AUTO: 0.06 X10(3) UL (ref 0–0.7)
EOSINOPHIL NFR BLD AUTO: 1 %
ERYTHROCYTE [DISTWIDTH] IN BLOOD BY AUTOMATED COUNT: 14.3 % (ref 11–15)
GLUCOSE BLD-MCNC: 96 MG/DL (ref 70–99)
HCT VFR BLD AUTO: 39.7 %
HGB BLD-MCNC: 12.7 G/DL
IMM GRANULOCYTES # BLD AUTO: 0.02 X10(3) UL (ref 0–1)
IMM GRANULOCYTES NFR BLD: 0.3 %
LYMPHOCYTES # BLD AUTO: 1.2 X10(3) UL (ref 1–4)
LYMPHOCYTES NFR BLD AUTO: 19.8 %
MCH RBC QN AUTO: 27 PG (ref 26–34)
MCHC RBC AUTO-ENTMCNC: 32 G/DL (ref 31–37)
MCV RBC AUTO: 84.3 FL
MONOCYTES # BLD AUTO: 0.38 X10(3) UL (ref 0.1–1)
MONOCYTES NFR BLD AUTO: 6.3 %
NEUTROPHILS # BLD AUTO: 4.38 X10 (3) UL (ref 1.5–7.7)
NEUTROPHILS # BLD AUTO: 4.38 X10(3) UL (ref 1.5–7.7)
NEUTROPHILS NFR BLD AUTO: 72.1 %
OSMOLALITY SERPL CALC.SUM OF ELEC: 286 MOSM/KG (ref 275–295)
PLATELET # BLD AUTO: 336 10(3)UL (ref 150–450)
POTASSIUM SERPL-SCNC: 3.9 MMOL/L (ref 3.5–5.1)
RBC # BLD AUTO: 4.71 X10(6)UL
SODIUM SERPL-SCNC: 138 MMOL/L (ref 136–145)
WBC # BLD AUTO: 6.1 X10(3) UL (ref 4–11)

## 2021-02-12 PROCEDURE — 96361 HYDRATE IV INFUSION ADD-ON: CPT

## 2021-02-12 PROCEDURE — 99284 EMERGENCY DEPT VISIT MOD MDM: CPT

## 2021-02-12 PROCEDURE — 96360 HYDRATION IV INFUSION INIT: CPT

## 2021-02-12 PROCEDURE — 82550 ASSAY OF CK (CPK): CPT | Performed by: EMERGENCY MEDICINE

## 2021-02-12 PROCEDURE — 80048 BASIC METABOLIC PNL TOTAL CA: CPT | Performed by: EMERGENCY MEDICINE

## 2021-02-12 PROCEDURE — 85025 COMPLETE CBC W/AUTO DIFF WBC: CPT | Performed by: EMERGENCY MEDICINE

## 2021-02-12 NOTE — ED NOTES
Assumed care of Jensen Michelle upon arrival in room 27 via triage. Patient A&Ox4, states other than mild headache she feels fine. Sent for elevated CK level. IV placed and blood specimens sent to lab. Fluids infusing through patent line.  Patient provided call yanet

## 2021-02-13 NOTE — ED PROVIDER NOTES
Patient Seen in: [facility] AND [facility] Emergency Department    History   Patient presents with:  Rhabdomyelosis      HPI    Patient presents to the ED for elevated CK levels done in outpatient laboratory testing by her infectious doctor.   Patient was recentl • Heart Disease Mother        Smoking Status: Social History    Socioeconomic History      Marital status:       Spouse name: Not on file      Number of children: Not on file      Years of education: Not on file      Highest education level: Not o distension. Musculoskeletal:         General: No deformity or edema. Neurological: She is alert and oriented to person, place, and time. Skin: Skin is warm and dry. Psychiatric: She has a normal mood and affect.  Her behavior is normal.   Nursing no Diagnostic Considerations: Rhabdomyolysis    Medical Record Review: I personally reviewed available prior medical records for any recent pertinent discharge summaries, testing, and procedures and reviewed those reports. Complicating Factors:  The patient

## 2021-02-15 ENCOUNTER — PATIENT MESSAGE (OUTPATIENT)
Dept: NEPHROLOGY | Facility: CLINIC | Age: 71
End: 2021-02-15

## 2021-02-15 DIAGNOSIS — R78.81 BACTEREMIA: Primary | ICD-10-CM

## 2021-02-16 NOTE — TELEPHONE ENCOUNTER
From: Taylor Suarez  To: Aiden Peña MD  Sent: 2/15/2021 5:00 PM CST  Subject: Visit Follow-up Question    Went to ER on Friday afternoon per Dr Lashay Avila direction. Had 2 course of saline. This did help with the muscle problems.  Have not taken th

## 2021-02-16 NOTE — TELEPHONE ENCOUNTER
Patient states she's feeling fine. Still self catheterizing 4x daily. Off antibiotics since Sunday night and not taking rosuvastatin. Dr. Steven Sultana just ordered blood cultures. Will have blood cultures and Dr. Jo Lan labs done 2/23/21.  Appt scheduled 2/

## 2021-02-16 NOTE — TELEPHONE ENCOUNTER
Dr. Madhav Cole,  Please see below. Pt went to ER Friday dx rhabdomyolysis. Pt wants to know if they should schedule a hospital F/U with you before or after their blood tests on Tuesday 2/23.

## 2021-02-23 ENCOUNTER — LAB ENCOUNTER (OUTPATIENT)
Dept: LAB | Facility: HOSPITAL | Age: 71
End: 2021-02-23
Attending: INTERNAL MEDICINE
Payer: MEDICARE

## 2021-02-23 DIAGNOSIS — R79.89 ELEVATED LIVER FUNCTION TESTS: ICD-10-CM

## 2021-02-23 DIAGNOSIS — I10 ESSENTIAL HYPERTENSION: ICD-10-CM

## 2021-02-23 DIAGNOSIS — D64.9 ANEMIA, UNSPECIFIED TYPE: ICD-10-CM

## 2021-02-23 DIAGNOSIS — R78.81 BACTEREMIA: ICD-10-CM

## 2021-02-23 LAB
ALBUMIN SERPL-MCNC: 3.9 G/DL (ref 3.4–5)
ALBUMIN/GLOB SERPL: 1 {RATIO} (ref 1–2)
ALP LIVER SERPL-CCNC: 79 U/L
ALT SERPL-CCNC: 41 U/L
ANION GAP SERPL CALC-SCNC: 3 MMOL/L (ref 0–18)
AST SERPL-CCNC: 20 U/L (ref 15–37)
BASOPHILS # BLD AUTO: 0.02 X10(3) UL (ref 0–0.2)
BASOPHILS NFR BLD AUTO: 0.6 %
BILIRUB SERPL-MCNC: 0.5 MG/DL (ref 0.1–2)
BUN BLD-MCNC: 15 MG/DL (ref 7–18)
BUN/CREAT SERPL: 14.7 (ref 10–20)
CALCIUM BLD-MCNC: 9.7 MG/DL (ref 8.5–10.1)
CHLORIDE SERPL-SCNC: 106 MMOL/L (ref 98–112)
CK SERPL-CCNC: 80 U/L
CO2 SERPL-SCNC: 31 MMOL/L (ref 21–32)
CREAT BLD-MCNC: 1.02 MG/DL
DEPRECATED HBV CORE AB SER IA-ACNC: 84 NG/ML
DEPRECATED RDW RBC AUTO: 46.5 FL (ref 35.1–46.3)
EOSINOPHIL # BLD AUTO: 0.12 X10(3) UL (ref 0–0.7)
EOSINOPHIL NFR BLD AUTO: 3.3 %
ERYTHROCYTE [DISTWIDTH] IN BLOOD BY AUTOMATED COUNT: 15 % (ref 11–15)
GLOBULIN PLAS-MCNC: 4.1 G/DL (ref 2.8–4.4)
GLUCOSE BLD-MCNC: 88 MG/DL (ref 70–99)
HCT VFR BLD AUTO: 40.3 %
HGB BLD-MCNC: 12.7 G/DL
IMM GRANULOCYTES # BLD AUTO: 0.01 X10(3) UL (ref 0–1)
IMM GRANULOCYTES NFR BLD: 0.3 %
IRON SATURATION: 20 %
IRON SERPL-MCNC: 78 UG/DL
LYMPHOCYTES # BLD AUTO: 0.96 X10(3) UL (ref 1–4)
LYMPHOCYTES NFR BLD AUTO: 26.4 %
M PROTEIN MFR SERPL ELPH: 8 G/DL (ref 6.4–8.2)
MCH RBC QN AUTO: 26.9 PG (ref 26–34)
MCHC RBC AUTO-ENTMCNC: 31.5 G/DL (ref 31–37)
MCV RBC AUTO: 85.4 FL
MONOCYTES # BLD AUTO: 0.3 X10(3) UL (ref 0.1–1)
MONOCYTES NFR BLD AUTO: 8.3 %
NEUTROPHILS # BLD AUTO: 2.22 X10 (3) UL (ref 1.5–7.7)
NEUTROPHILS # BLD AUTO: 2.22 X10(3) UL (ref 1.5–7.7)
NEUTROPHILS NFR BLD AUTO: 61.1 %
OSMOLALITY SERPL CALC.SUM OF ELEC: 290 MOSM/KG (ref 275–295)
PATIENT FASTING Y/N/NP: YES
PLATELET # BLD AUTO: 211 10(3)UL (ref 150–450)
POTASSIUM SERPL-SCNC: 3.8 MMOL/L (ref 3.5–5.1)
RBC # BLD AUTO: 4.72 X10(6)UL
SODIUM SERPL-SCNC: 140 MMOL/L (ref 136–145)
TOTAL IRON BINDING CAPACITY: 392 UG/DL (ref 240–450)
TRANSFERRIN SERPL-MCNC: 263 MG/DL (ref 200–360)
WBC # BLD AUTO: 3.6 X10(3) UL (ref 4–11)

## 2021-02-23 PROCEDURE — 85025 COMPLETE CBC W/AUTO DIFF WBC: CPT

## 2021-02-23 PROCEDURE — 87040 BLOOD CULTURE FOR BACTERIA: CPT

## 2021-02-23 PROCEDURE — 83540 ASSAY OF IRON: CPT

## 2021-02-23 PROCEDURE — 84466 ASSAY OF TRANSFERRIN: CPT

## 2021-02-23 PROCEDURE — 36415 COLL VENOUS BLD VENIPUNCTURE: CPT

## 2021-02-23 PROCEDURE — 82728 ASSAY OF FERRITIN: CPT

## 2021-02-23 PROCEDURE — 80053 COMPREHEN METABOLIC PANEL: CPT

## 2021-02-23 PROCEDURE — 82550 ASSAY OF CK (CPK): CPT

## 2021-02-26 ENCOUNTER — OFFICE VISIT (OUTPATIENT)
Dept: NEPHROLOGY | Facility: CLINIC | Age: 71
End: 2021-02-26
Payer: MEDICARE

## 2021-02-26 VITALS
WEIGHT: 138 LBS | SYSTOLIC BLOOD PRESSURE: 110 MMHG | DIASTOLIC BLOOD PRESSURE: 70 MMHG | BODY MASS INDEX: 27.82 KG/M2 | HEIGHT: 59 IN | HEART RATE: 86 BPM

## 2021-02-26 DIAGNOSIS — M62.82 NON-TRAUMATIC RHABDOMYOLYSIS: Primary | ICD-10-CM

## 2021-02-26 DIAGNOSIS — I10 ESSENTIAL HYPERTENSION: ICD-10-CM

## 2021-02-26 DIAGNOSIS — N13.5 URETERAL STRICTURE, RIGHT: ICD-10-CM

## 2021-02-26 PROCEDURE — 99213 OFFICE O/P EST LOW 20 MIN: CPT | Performed by: INTERNAL MEDICINE

## 2021-02-26 RX ORDER — MULTIVIT WITH MINERALS/LUTEIN
250 TABLET ORAL 2 TIMES DAILY
COMMUNITY

## 2021-02-27 NOTE — PROGRESS NOTES
[facility], [facility]  Nephrology Daily Progress Note    Royal Willis  [id number]  65year old  Patient presents with:  Er F/u: er follow up       HPI:   Royal Willis is a 65year old female.   38-year-old female with a history of hypertension, hyperchole wheezing      PHYSICAL EXAM:     Vitals History 2/12/2021 2/26/2021 2/26/2021   /70 - 110/70   BP Location - - -   Patient Position - - -   Cuff Size - - -   Pulse 86 - 86   Resp 20 - -   Temp - - -   SpO2 97 - -   Weight - - 138 lbs   Height - - 59 Medications:   •  ascorbic acid, VITAMIN C, 250 MG Oral Tab, Take 250 mg by mouth 2 (two) times a day., Disp: , Rfl:   •  CRANBERRY OR, Take by mouth 2 (two) times a day.  Pt unsure of dose, Disp: , Rfl:   •  LISINOPRIL-HYDROCHLOROTHIAZIDE 20-12.5 MG Oral T hypertension  Ureteral stricture, right    Patient Active Problem List:     SX.6/26/14, [id number], RT CUBITAL TUNNEL RELEASE, DR. Meggan Johnson. EXP.9/23/14     Essential hypertension     Hypercholesterolemia     Hypothyroidism     Brachial neuritis or ra

## 2021-03-01 ENCOUNTER — HOSPITAL ENCOUNTER (OUTPATIENT)
Dept: ULTRASOUND IMAGING | Facility: HOSPITAL | Age: 71
Discharge: HOME | End: 2021-03-01
Attending: UROLOGY
Payer: MEDICARE

## 2021-03-01 DIAGNOSIS — N13.30 HYDRONEPHROSIS, UNSPECIFIED HYDRONEPHROSIS TYPE: ICD-10-CM

## 2021-03-01 PROCEDURE — 76775 US EXAM ABDO BACK WALL LIM: CPT | Performed by: UROLOGY

## 2021-03-03 RX ORDER — LEVOTHYROXINE SODIUM 0.07 MG/1
TABLET ORAL
Qty: 90 TABLET | Refills: 1 | Status: SHIPPED | OUTPATIENT
Start: 2021-03-03 | End: 2021-09-01

## 2021-03-05 ENCOUNTER — TELEPHONE (OUTPATIENT)
Dept: SURGERY | Facility: CLINIC | Age: 71
End: 2021-03-05

## 2021-03-05 NOTE — TELEPHONE ENCOUNTER
Received fax from [facility] medical to fax over notes from 1/1/2021 to present. Notes faxed to confirmed fax number.

## 2021-03-09 ENCOUNTER — OFFICE VISIT (OUTPATIENT)
Dept: SURGERY | Facility: CLINIC | Age: 71
End: 2021-03-09
Payer: MEDICARE

## 2021-03-09 VITALS
HEIGHT: 59 IN | SYSTOLIC BLOOD PRESSURE: 102 MMHG | HEART RATE: 71 BPM | BODY MASS INDEX: 27.82 KG/M2 | DIASTOLIC BLOOD PRESSURE: 61 MMHG | WEIGHT: 138 LBS

## 2021-03-09 DIAGNOSIS — N13.30 HYDRONEPHROSIS, UNSPECIFIED HYDRONEPHROSIS TYPE: Primary | ICD-10-CM

## 2021-03-09 DIAGNOSIS — R31.0 GROSS HEMATURIA: ICD-10-CM

## 2021-03-09 DIAGNOSIS — N39.0 RECURRENT UTI: ICD-10-CM

## 2021-03-09 DIAGNOSIS — N19 RENAL FAILURE, UNSPECIFIED CHRONICITY: ICD-10-CM

## 2021-03-09 DIAGNOSIS — Z23 NEED FOR VACCINATION: ICD-10-CM

## 2021-03-09 DIAGNOSIS — N39.3 STRESS INCONTINENCE IN FEMALE: ICD-10-CM

## 2021-03-09 DIAGNOSIS — N31.9 NEUROGENIC BLADDER: ICD-10-CM

## 2021-03-09 DIAGNOSIS — R33.9 URINARY RETENTION: ICD-10-CM

## 2021-03-09 DIAGNOSIS — Z79.01 ON RIVAROXABAN THERAPY: ICD-10-CM

## 2021-03-09 PROCEDURE — 99215 OFFICE O/P EST HI 40 MIN: CPT | Performed by: UROLOGY

## 2021-03-09 RX ORDER — SULFAMETHOXAZOLE AND TRIMETHOPRIM 800; 160 MG/1; MG/1
TABLET ORAL
COMMUNITY
Start: 2021-03-08

## 2021-03-09 NOTE — PROGRESS NOTES
HPI:    Patient ID: Sander Luz is a 65year old female. Patient recently saw two different urologists at [facility]. She wants my opinion regarding their assessment of her urological conditions and what they found.        HPI     Hydronephrosis    Gross hematuria   Problem started morning of 11/05/2020; secondary to 11/04/2020 colon catheter insertion for urinary retention; mild gross hematuria at the time.  Most recent 01/05/2021 Urine cytology = negative for high-grade urothelial carcinoma and mild and stable.      Recurrent UTI   Problem started 12/19/2020 when Urine culture = 10,000 - 50,000 CFU/ML Escherichia coli (resistant to ampicillin and ampicillin+sulbactam).  01/02/2021 Urine culture = >100,000 CFU/ML Escherichia coli (resistant to ampi developed film while in school; her great aunt had bladder cancer   11/30/2020 Office visit with me; Continue self-catheterization 4x daily   01/05/2021 Cystoscopy, right retrograde pyelogram x-ray, right nephroureteroscopy, balloon dilation of right urete constipation. Genitourinary: Negative for dysuria, flank pain and hematuria. Neurological: Negative for speech difficulty. Psychiatric/Behavioral: The patient is not nervous/anxious.           Current Outpatient Medications   Medication Sig Dispense R COMMENTS)    HISTORY:  Past Medical History:   Diagnosis Date   • A-fib ([facility] [address]     managed   • Abnormal cholesterol test 1998    CHOLESTEROL 1998 PER.  NG.   • Arrhythmia     Atrial Fibrillation   • Arthritis    • Back problem    • Cataract    • Disorder of Normal range of motion. Skin:     General: Skin is dry. Neurological:      Mental Status: She is alert and oriented to person, place, and time. Psychiatric:         Thought Content:  Thought content normal.              03/09/21  1355   BP: 102/61   P GFR = 43   09/12/2020 Creatinine = 1.08; GFR = 51   06/16/2020 Urine culture = no growth 18-24 hrs; UA blood = negative; Leukocyte = trace; WBC = 8; RBC = 1; Bacteria = negative   05/20/2019 Creatinine = 0.74; GFR = 82; UA blood = negative; WBC = 1; RBC = bladder distention.              11/05/2019 SIMPLE CMG   1st sensation at 130 cc   1st desire to void at 200 cc   Urgency at 240-260 cc   Bladder pain at 300 cc      CONCLUSIONS = Weak bladder contraction, however, bladder sensation and muscle tone intact kidney;  Left kidney relatively normal. 11/09/2020 CT UROGRAM (W+WO) = Chronic right hydroureteronephrosis without visible obstructing calculus; Associated right renal cortical thinning with delayed right renal nephrogram as well as delayed excretion of con continue routine BMP every 4 months and routine US Kidneys every 6 months to monitor kidney function and to ensure obstructive stricture/worsening hydronephrosis do not return.      (R31.0) Gross hematuria  Problem started morning of 11/05/2020; secondary difficulty or complications. The patient states she does not urinate on her own in between catheterization. 11/21/2020 XR CYSTOGRAM = Moderately trabeculated bladder with multiple small diverticula; No reflux; Lawson catheter in place.  Most recent 01/05/202 growth 18-24 hrs. 01/30/2021--02/02/2021 [facility] ER for right flank pain, fever; 01/29/2021 Blood culture = Enterococcus faecalis (pansensitive)--treated with IV daptomycin; discharged on daily IV daptomycin.  Patient is presently taking  HipRex 1 g, half-tab B and history of PE and is at risk for future surgical procedures and will need to hold medication prior to any surgical procedure.            I explained to patient the risks, side effects, and alternatives, and I answered questions concerning them; patient worsen. 8.  Please let us know ultimately with whom you will follow up in the future, long-term.        Orders Placed This Encounter      Urinalysis, STANDING      Urine Culture, STANDING      Meds This Visit:  Requested Prescriptions      No prescriptio

## 2021-03-09 NOTE — PATIENT INSTRUCTIONS
Lia Poole M.D.    1.  I recommend that you continue intermittent catheterization 4 times a day, however, I would seek an opinion from [facility] as to whether or not InterStim stimulator might help you to st

## 2021-04-01 ENCOUNTER — ANCILLARY PROCEDURE (OUTPATIENT)
Dept: CARDIOLOGY | Age: 71
End: 2021-04-01
Attending: INTERNAL MEDICINE

## 2021-04-01 ENCOUNTER — ANCILLARY ORDERS (OUTPATIENT)
Dept: CARDIOLOGY | Age: 71
End: 2021-04-01

## 2021-04-01 DIAGNOSIS — Z45.018 ENCOUNTER FOR CARE OF PACEMAKER: ICD-10-CM

## 2021-04-01 PROCEDURE — X1114 CARDIAC DEVICE HOME CHECK - REMOTE UNSCHEDULED: HCPCS | Performed by: INTERNAL MEDICINE

## 2021-04-01 PROCEDURE — 93294 REM INTERROG EVL PM/LDLS PM: CPT | Performed by: INTERNAL MEDICINE

## 2021-05-17 ENCOUNTER — PATIENT MESSAGE (OUTPATIENT)
Dept: SURGERY | Facility: CLINIC | Age: 71
End: 2021-05-17

## 2021-05-17 RX ORDER — METHENAMINE HIPPURATE 1000 MG/1
TABLET ORAL
Qty: 180 TABLET | Refills: 3 | Status: SHIPPED | OUTPATIENT
Start: 2021-05-17

## 2021-05-17 NOTE — TELEPHONE ENCOUNTER
From: Ivette Sharpe  To: Gretel Isaacs MD  Sent: 5/17/2021 10:32 AM CDT  Subject: Prescription Question    Need to refill my prescription Methenamine Yessenia 1GM. Now taking one full tablet twice a day with vitamin C.   changed this at last visit but

## 2021-05-17 NOTE — TELEPHONE ENCOUNTER
Last office visit = 3/9/2021; see Recurrent UTI; copied and pasted below. patient decides to increase HipRex 1 g, up to 1 full tab BID along with vitamin C and start standing order for UA and urine culture.  I send electronic prescription to patient's ph

## 2021-06-04 ENCOUNTER — TELEPHONE (OUTPATIENT)
Dept: SURGERY | Facility: CLINIC | Age: 71
End: 2021-06-04

## 2021-06-04 NOTE — TELEPHONE ENCOUNTER
Incoming fax from [address] [place] [address]. Written order for catheter supplies I will have Dr. Vallejo July sign we will fax the order back to [phone] then send to [facility].

## 2021-07-09 ENCOUNTER — TELEPHONE (OUTPATIENT)
Dept: NEPHROLOGY | Facility: CLINIC | Age: 71
End: 2021-07-09

## 2021-07-11 ENCOUNTER — ANCILLARY ORDERS (OUTPATIENT)
Dept: CARDIOLOGY | Age: 71
End: 2021-07-11

## 2021-07-11 ENCOUNTER — ANCILLARY PROCEDURE (OUTPATIENT)
Dept: CARDIOLOGY | Age: 71
End: 2021-07-11
Attending: INTERNAL MEDICINE

## 2021-07-11 DIAGNOSIS — Z95.0 CARDIAC PACEMAKER: ICD-10-CM

## 2021-07-11 PROCEDURE — X1114 CARDIAC DEVICE HOME CHECK - REMOTE UNSCHEDULED: HCPCS | Performed by: INTERNAL MEDICINE

## 2021-07-16 RX ORDER — LISINOPRIL AND HYDROCHLOROTHIAZIDE 20; 12.5 MG/1; MG/1
1 TABLET ORAL DAILY
Qty: 90 TABLET | Refills: 0 | Status: SHIPPED | OUTPATIENT
Start: 2021-07-16 | End: 2021-10-11

## 2021-09-01 RX ORDER — LEVOTHYROXINE SODIUM 0.07 MG/1
TABLET ORAL
Qty: 90 TABLET | Refills: 0 | Status: SHIPPED | OUTPATIENT
Start: 2021-09-01 | End: 2021-11-17

## 2021-10-11 RX ORDER — LISINOPRIL AND HYDROCHLOROTHIAZIDE 20; 12.5 MG/1; MG/1
TABLET ORAL
Qty: 90 TABLET | Refills: 1 | Status: SHIPPED | OUTPATIENT
Start: 2021-10-11

## 2021-10-12 ENCOUNTER — HOSPITAL ENCOUNTER (OUTPATIENT)
Dept: MAMMOGRAPHY | Age: 71
Discharge: HOME | End: 2021-10-12
Attending: INTERNAL MEDICINE
Payer: MEDICARE

## 2021-10-12 DIAGNOSIS — Z12.31 ENCOUNTER FOR SCREENING MAMMOGRAM FOR BREAST CANCER: ICD-10-CM

## 2021-10-12 PROCEDURE — 77063 BREAST TOMOSYNTHESIS BI: CPT | Performed by: INTERNAL MEDICINE

## 2021-10-12 PROCEDURE — 77067 SCR MAMMO BI INCL CAD: CPT | Performed by: INTERNAL MEDICINE

## 2021-10-18 RX ORDER — FEXOFENADINE HCL AND PSEUDOEPHEDRINE HCI 180; 240 MG/1; MG/1
1 TABLET, EXTENDED RELEASE ORAL DAILY
Qty: 90 TABLET | Refills: 0 | Status: SHIPPED
Start: 2021-10-18

## 2021-10-18 NOTE — TELEPHONE ENCOUNTER
Current Outpatient Medications   Medication Sig Dispense Refill   • Fexofenadine-Pseudoephed -240 MG Oral Tablet 24 Hr Take 1 tablet daily by mouth.  (Patient not taking:  ) 30 tablet 3

## 2021-10-27 ENCOUNTER — OFFICE VISIT (OUTPATIENT)
Dept: NEPHROLOGY | Facility: CLINIC | Age: 71
End: 2021-10-27
Payer: MEDICARE

## 2021-10-27 VITALS
HEIGHT: 59 IN | DIASTOLIC BLOOD PRESSURE: 69 MMHG | SYSTOLIC BLOOD PRESSURE: 111 MMHG | BODY MASS INDEX: 30.57 KG/M2 | HEART RATE: 85 BPM | WEIGHT: 151.63 LBS

## 2021-10-27 DIAGNOSIS — E78.00 HYPERCHOLESTEROLEMIA: ICD-10-CM

## 2021-10-27 DIAGNOSIS — I10 ESSENTIAL HYPERTENSION: ICD-10-CM

## 2021-10-27 DIAGNOSIS — E03.9 ACQUIRED HYPOTHYROIDISM: ICD-10-CM

## 2021-10-27 DIAGNOSIS — R10.30 LOWER ABDOMINAL PAIN: Primary | ICD-10-CM

## 2021-10-27 PROCEDURE — 99215 OFFICE O/P EST HI 40 MIN: CPT | Performed by: INTERNAL MEDICINE

## 2021-10-27 NOTE — PROGRESS NOTES
[facility]  Nephrology Daily Progress Note    Tyson Vidal  [id number]  66year old  Patient presents with: Follow - Up: lower abdominal pain      HPI:   Tyson Vidal is a 66year old female.   15-year-old female with a history of hypertension symptoms  Neurological:  Negative for gait disturbance  Psychiatric:  Negative for inappropriate interaction and psychiatric symptoms  Respiratory:  Negative for cough, dyspnea and wheezing      PHYSICAL EXAM:     Vitals History 3/9/2021 10/27/2021 10/27/2 MORNING BEFORE BREAKFAST, Disp: 90 tablet, Rfl: 0  •  Methenamine Hippurate 1 g Oral Tab, Take 1 tablet (1 gram) by mouth twice a day with vitamin C., Disp: 180 tablet, Rfl: 3  •  ascorbic acid, VITAMIN C, 250 MG Oral Tab, Take 250 mg by mouth 2 (two) time elbow, right     Ureteral stricture, right     Bacteremia     Hypokalemia      Patient with suprapubic discomfort. Will obtain routine laboratory studies along with urinalysis and urine culture.   If work-up benign and symptoms persist will need a CT scan

## 2021-10-27 NOTE — PATIENT INSTRUCTIONS
Please do laboratory studies as ordered. After you do the Blood Test Place, Crestor on hold and let me know how your joint symptoms are doing in 2 weeks. If the abdominal pain becomes severe go to the emergency room.

## 2021-10-28 ENCOUNTER — LAB ENCOUNTER (OUTPATIENT)
Dept: LAB | Age: 71
End: 2021-10-28
Attending: INTERNAL MEDICINE
Payer: MEDICARE

## 2021-10-28 ENCOUNTER — PATIENT MESSAGE (OUTPATIENT)
Dept: NEPHROLOGY | Facility: CLINIC | Age: 71
End: 2021-10-28

## 2021-10-28 DIAGNOSIS — E03.9 ACQUIRED HYPOTHYROIDISM: ICD-10-CM

## 2021-10-28 DIAGNOSIS — R10.30 LOWER ABDOMINAL PAIN: ICD-10-CM

## 2021-10-28 DIAGNOSIS — E78.00 HYPERCHOLESTEROLEMIA: ICD-10-CM

## 2021-10-28 DIAGNOSIS — I10 ESSENTIAL HYPERTENSION: ICD-10-CM

## 2021-10-28 PROCEDURE — 87086 URINE CULTURE/COLONY COUNT: CPT

## 2021-10-28 PROCEDURE — 80053 COMPREHEN METABOLIC PANEL: CPT

## 2021-10-28 PROCEDURE — 80061 LIPID PANEL: CPT

## 2021-10-28 PROCEDURE — 84443 ASSAY THYROID STIM HORMONE: CPT

## 2021-10-28 PROCEDURE — 81001 URINALYSIS AUTO W/SCOPE: CPT

## 2021-10-28 PROCEDURE — 36415 COLL VENOUS BLD VENIPUNCTURE: CPT

## 2021-10-28 PROCEDURE — 85025 COMPLETE CBC W/AUTO DIFF WBC: CPT

## 2021-10-28 NOTE — TELEPHONE ENCOUNTER
From: Tyson Vidal  To: Tiffany Nash MD  Sent: 10/28/2021 11:23 AM CDT  Subject: Vacinations    Want to get both the Flu and Ayala Peter booster. Can I get them together? Where should I go for them?

## 2021-10-29 ENCOUNTER — TELEPHONE (OUTPATIENT)
Dept: NEPHROLOGY | Facility: CLINIC | Age: 71
End: 2021-10-29

## 2021-10-29 DIAGNOSIS — R10.30 LOWER ABDOMINAL PAIN: Primary | ICD-10-CM

## 2021-10-29 NOTE — TELEPHONE ENCOUNTER
Labs look good and urine C&S neg.   If still having abd pain do CT scan of abd and pelvis with contrast

## 2021-10-29 NOTE — TELEPHONE ENCOUNTER
Spoke with patient, discussed below message. Pt verbalizes understanding. Dr. Evangelina Reyes, do you mind just verifying this is the CT you want? Pended.

## 2021-11-07 ENCOUNTER — HOSPITAL ENCOUNTER (OUTPATIENT)
Dept: CT IMAGING | Facility: HOSPITAL | Age: 71
Discharge: HOME | End: 2021-11-07
Attending: INTERNAL MEDICINE
Payer: MEDICARE

## 2021-11-07 DIAGNOSIS — R10.30 LOWER ABDOMINAL PAIN: ICD-10-CM

## 2021-11-07 PROCEDURE — 74177 CT ABD & PELVIS W/CONTRAST: CPT | Performed by: INTERNAL MEDICINE

## 2021-11-08 ENCOUNTER — TELEPHONE (OUTPATIENT)
Dept: NEPHROLOGY | Facility: CLINIC | Age: 71
End: 2021-11-08

## 2021-11-08 NOTE — TELEPHONE ENCOUNTER
CT scans shows changes related to her bladder and the right kidney which may be chronic but she should review these findings with her urologist to see if this is the cause of her lower abdominal pain.   If not then she should see GI for further work-up and

## 2021-11-15 ENCOUNTER — PATIENT MESSAGE (OUTPATIENT)
Dept: NEPHROLOGY | Facility: CLINIC | Age: 71
End: 2021-11-15

## 2021-11-15 DIAGNOSIS — E78.00 HYPERCHOLESTEROLEMIA: ICD-10-CM

## 2021-11-15 DIAGNOSIS — R79.89 ELEVATED LIVER FUNCTION TESTS: Primary | ICD-10-CM

## 2021-11-16 ENCOUNTER — PATIENT MESSAGE (OUTPATIENT)
Dept: SURGERY | Facility: CLINIC | Age: 71
End: 2021-11-16

## 2021-11-16 RX ORDER — ATORVASTATIN CALCIUM 20 MG/1
20 TABLET, FILM COATED ORAL NIGHTLY
Qty: 30 TABLET | Refills: 3 | Status: SHIPPED | OUTPATIENT
Start: 2021-11-16

## 2021-11-16 NOTE — TELEPHONE ENCOUNTER
Switch to lipitor 20 mg, 1 every day #30, refills 3. Liver and lipid panel in 1 mo. Let me know if symptoms reoccur.

## 2021-11-16 NOTE — TELEPHONE ENCOUNTER
From: Philip Murphy  To: Shawn Sharma MD  Sent: 11/15/2021 5:20 PM CST  Subject: Use of Rosuvastatin    Stopped using medication 17 days ago. , per your instructions. Cramping in the hands and feet has stopped.  Took about a week before I saw results b

## 2021-11-16 NOTE — TELEPHONE ENCOUNTER
Dr. Justice Delacruz, please see message below. Requesting if she should be started on another statin medication.

## 2021-11-17 RX ORDER — LEVOTHYROXINE SODIUM 0.07 MG/1
TABLET ORAL
Qty: 90 TABLET | Refills: 1 | Status: SHIPPED | OUTPATIENT
Start: 2021-11-17

## 2021-11-18 NOTE — TELEPHONE ENCOUNTER
From: Maria Luz Mcrae  To: Hardik Ulloa MD  Sent: 11/16/2021 3:01 PM CST  Subject: New Appointment    I received a message from Dr Chey Tang to schedule an appt but the next appt comes up in February.  [facility]

## 2021-11-18 NOTE — TELEPHONE ENCOUNTER
RN forwarded patient's concern to Dr Laith Manuel to advise. Note, this patient was last seen by Dr Laith Manuel on 3/9/21 and she also seen another Urologist at [facility].

## 2021-11-19 NOTE — TELEPHONE ENCOUNTER
I sent patient the following message by means of \"my chart\" =    Donel Netarlen, when I last saw me in the office on 3/9/2021, we decided that you would follow-up with Dr. Millicent Shin or one of his partners at [facility] so I am a little bit surprised that you are c 11/18/2021  2:42 PM                 Signed           From: Tapan Owens  To: Gene Hewitt MD  Sent: 11/16/2021  3:01 PM CST  Subject: New Appointment    I received a message from Dr Dewayne Bledsoe to schedule an appt but the next appt comes up in Febru

## 2021-11-22 ENCOUNTER — PATIENT MESSAGE (OUTPATIENT)
Dept: SURGERY | Facility: CLINIC | Age: 71
End: 2021-11-22

## 2021-11-22 DIAGNOSIS — N13.5 UPJ OBSTRUCTION, ACQUIRED: Primary | ICD-10-CM

## 2021-11-24 NOTE — TELEPHONE ENCOUNTER
Ruth Reilly,    Please keep your appointment and see me in February. In the meantime, please schedule appointment for follow-up Lasix renogram.  I entered the order electronically. You can schedule it by calling [phone].   We will notify you of the results

## 2021-11-24 NOTE — TELEPHONE ENCOUNTER
From: Katherine Orosco  Sent: 11/22/2021 2:19 PM CST  To: Rose sOcar MD  Subject: New Appointment    ----- Message from Haresh Denny sent at 11/22/2021 2:19 PM CST -----       ----- Message from Man Falcon to Delroy Andrew MD sent at 11/2 better result than the November 6, 2020 nuclear medicine Lasix renal scan.  It might be better because on 1/5/2021 at [facility], you did undergo right nephro-ureteroscopy with dilation of right ureteropelvic junction, insertion of right ure

## 2021-12-23 ENCOUNTER — LAB ENCOUNTER (OUTPATIENT)
Dept: LAB | Facility: HOSPITAL | Age: 71
End: 2021-12-23
Attending: UROLOGY
Payer: MEDICARE

## 2021-12-23 DIAGNOSIS — N39.0 RECURRENT UTI: ICD-10-CM

## 2021-12-23 PROCEDURE — 87086 URINE CULTURE/COLONY COUNT: CPT

## 2021-12-23 PROCEDURE — 81001 URINALYSIS AUTO W/SCOPE: CPT

## 2021-12-24 ENCOUNTER — HOSPITAL ENCOUNTER (OUTPATIENT)
Dept: NUCLEAR MEDICINE | Facility: HOSPITAL | Age: 71
End: 2021-12-24
Attending: UROLOGY
Payer: MEDICARE

## 2021-12-27 ENCOUNTER — HOSPITAL ENCOUNTER (OUTPATIENT)
Dept: NUCLEAR MEDICINE | Facility: HOSPITAL | Age: 71
Discharge: HOME | End: 2021-12-27
Attending: UROLOGY
Payer: MEDICARE

## 2021-12-27 DIAGNOSIS — N13.5 UPJ OBSTRUCTION, ACQUIRED: ICD-10-CM

## 2021-12-27 PROCEDURE — 78708 K FLOW/FUNCT IMAGE W/DRUG: CPT | Performed by: UROLOGY

## 2021-12-27 RX ORDER — FUROSEMIDE 10 MG/ML
INJECTION INTRAMUSCULAR; INTRAVENOUS
Status: COMPLETED
Start: 2021-12-27 | End: 2021-12-27

## 2021-12-27 RX ORDER — FUROSEMIDE 10 MG/ML
40 INJECTION INTRAMUSCULAR; INTRAVENOUS ONCE
Status: COMPLETED | OUTPATIENT
Start: 2021-12-27 | End: 2021-12-27

## 2021-12-27 RX ADMIN — FUROSEMIDE 40 MG: 10 INJECTION INTRAMUSCULAR; INTRAVENOUS at 09:19:00

## 2021-12-28 DIAGNOSIS — N13.30 HYDRONEPHROSIS OF RIGHT KIDNEY: Primary | ICD-10-CM

## 2021-12-29 NOTE — PROGRESS NOTES
I sent patient the following message by means of \"my chart\" =  Pravin Ernesto,  12/27/2021 nuclear medicine renal scan with Lasix at [facility] showed that the right kidney was responsible for 19% of total kidney function and the left kidney was r

## 2022-01-21 ENCOUNTER — HOSPITAL ENCOUNTER (OUTPATIENT)
Dept: ULTRASOUND IMAGING | Facility: HOSPITAL | Age: 72
Discharge: HOME | End: 2022-01-21
Attending: UROLOGY
Payer: MEDICARE

## 2022-01-21 DIAGNOSIS — N13.30 HYDRONEPHROSIS OF RIGHT KIDNEY: ICD-10-CM

## 2022-01-21 PROCEDURE — 76775 US EXAM ABDO BACK WALL LIM: CPT | Performed by: UROLOGY

## 2022-02-01 ENCOUNTER — OFFICE VISIT (OUTPATIENT)
Dept: SURGERY | Facility: CLINIC | Age: 72
End: 2022-02-01
Payer: MEDICARE

## 2022-02-01 DIAGNOSIS — N39.0 RECURRENT UTI: ICD-10-CM

## 2022-02-01 DIAGNOSIS — N19 RENAL FAILURE, UNSPECIFIED CHRONICITY: ICD-10-CM

## 2022-02-01 DIAGNOSIS — R33.9 URINARY RETENTION: ICD-10-CM

## 2022-02-01 DIAGNOSIS — R10.9 RIGHT FLANK PAIN: ICD-10-CM

## 2022-02-01 DIAGNOSIS — N13.30 HYDRONEPHROSIS, UNSPECIFIED HYDRONEPHROSIS TYPE: Primary | ICD-10-CM

## 2022-02-01 DIAGNOSIS — N31.9 NEUROGENIC BLADDER: ICD-10-CM

## 2022-02-01 DIAGNOSIS — N39.3 STRESS INCONTINENCE IN FEMALE: ICD-10-CM

## 2022-02-01 DIAGNOSIS — Z79.01 ON RIVAROXABAN THERAPY: ICD-10-CM

## 2022-02-01 PROCEDURE — 99215 OFFICE O/P EST HI 40 MIN: CPT | Performed by: UROLOGY

## 2022-02-14 RX ORDER — ATORVASTATIN CALCIUM 20 MG/1
20 TABLET, FILM COATED ORAL NIGHTLY
Qty: 90 TABLET | Refills: 0 | Status: SHIPPED | OUTPATIENT
Start: 2022-02-14

## 2022-02-15 ENCOUNTER — PATIENT MESSAGE (OUTPATIENT)
Dept: NEPHROLOGY | Facility: CLINIC | Age: 72
End: 2022-02-15

## 2022-02-15 RX ORDER — LISINOPRIL AND HYDROCHLOROTHIAZIDE 12.5; 1 MG/1; MG/1
1 TABLET ORAL DAILY
Qty: 30 TABLET | Refills: 2 | Status: SHIPPED | OUTPATIENT
Start: 2022-02-15

## 2022-02-15 NOTE — TELEPHONE ENCOUNTER
There is only a lisinopril/HCT, 10/12.5, one daily, #30, refills 2. Check blood pressures daily and call in a week or 2 if too high or too low.  Again make sure she got letter to establish herself with new PCP

## 2022-02-15 NOTE — TELEPHONE ENCOUNTER
From: Sonia Vidales  To: Sita Dotson MD  Sent: 2/15/2022 10:09 AM CST  Subject: Lisinopril    At my last appt with Dr Kin Lemus (Cardiologist) he instructed me to cut my Lisinopril tablet in half and take it and monitor my blood pressure. My blood pressure has been consistent and now I am wondering if this pill comes in half the dose. I was taking HCTZ 20-12. 5-MG Tab. The pharmacy has auto renewed my prescription so I have a bottle of pills. Since you were the original prescriber should you change the order? Thanks for your help.   Sai Stewart

## 2022-02-15 NOTE — TELEPHONE ENCOUNTER
??????????  Is she cutting lisinopril dose in half or lisinopril/HCT in half. Need exact dose and why did Dr. Ernestina Merchant decrease dose? ???

## 2022-02-16 NOTE — TELEPHONE ENCOUNTER
Spoke with pt, discussed below message. Pt verbalizes understanding. List of recommended doctors sent via [address]

## 2022-04-01 ENCOUNTER — TELEPHONE (OUTPATIENT)
Dept: ORTHOPEDICS CLINIC | Facility: CLINIC | Age: 72
End: 2022-04-01

## 2022-04-01 NOTE — TELEPHONE ENCOUNTER
Order placed for LEFT KNEE XR WB with Right Knee comparison XR. Scheduled XR appt. MyChart sent to pt advising to arrive 30 min early to complete prior to appt.   Future Appointments   Date Time Provider Aaliyah Pineda   4/18/2022  3:00 PM LMB XR IC RM1 L[facility] RAD [facility] [place]   4/18/2022  3:30 PM LMB XR IC RM1 [facility] RAD [facility] [place]   4/18/2022  3:40 PM Marta Plunkett MD EM[facility] ORTHO LB [facility] [place]

## 2022-04-13 ENCOUNTER — PATIENT MESSAGE (OUTPATIENT)
Dept: SURGERY | Facility: CLINIC | Age: 72
End: 2022-04-13

## 2022-04-14 ENCOUNTER — TELEPHONE (OUTPATIENT)
Dept: SURGERY | Facility: CLINIC | Age: 72
End: 2022-04-14

## 2022-04-14 NOTE — TELEPHONE ENCOUNTER
Dr Fer Ledezma I have been using Hygea Benzalkonium Chloride Antiseptic Towelettes before inserting the catheter. Suggested by the nurse in your office. I have been using them with good results but now I am finding that they have been discontinued. Can you suggest an alternate? Thanks for your help.   Guero Lema

## 2022-04-18 ENCOUNTER — HOSPITAL ENCOUNTER (OUTPATIENT)
Dept: GENERAL RADIOLOGY | Age: 72
Discharge: HOME | End: 2022-04-18
Attending: ORTHOPAEDIC SURGERY
Payer: MEDICARE

## 2022-04-18 ENCOUNTER — OFFICE VISIT (OUTPATIENT)
Dept: ORTHOPEDICS CLINIC | Facility: CLINIC | Age: 72
End: 2022-04-18
Payer: MEDICARE

## 2022-04-18 VITALS — WEIGHT: 145 LBS | HEIGHT: 59 IN | BODY MASS INDEX: 29.23 KG/M2

## 2022-04-18 DIAGNOSIS — M25.562 LEFT KNEE PAIN, UNSPECIFIED CHRONICITY: ICD-10-CM

## 2022-04-18 DIAGNOSIS — M17.12 PRIMARY OSTEOARTHRITIS OF LEFT KNEE: Primary | ICD-10-CM

## 2022-04-18 PROCEDURE — 73564 X-RAY EXAM KNEE 4 OR MORE: CPT | Performed by: ORTHOPAEDIC SURGERY

## 2022-04-18 PROCEDURE — 99203 OFFICE O/P NEW LOW 30 MIN: CPT | Performed by: ORTHOPAEDIC SURGERY

## 2022-04-18 PROCEDURE — 73560 X-RAY EXAM OF KNEE 1 OR 2: CPT | Performed by: ORTHOPAEDIC SURGERY

## 2022-05-09 RX ORDER — LISINOPRIL AND HYDROCHLOROTHIAZIDE 12.5; 1 MG/1; MG/1
TABLET ORAL
Qty: 90 TABLET | Refills: 0 | OUTPATIENT
Start: 2022-05-09

## 2022-05-17 RX ORDER — ATORVASTATIN CALCIUM 20 MG/1
TABLET, FILM COATED ORAL
Qty: 90 TABLET | Refills: 0 | Status: SHIPPED | OUTPATIENT
Start: 2022-05-17

## 2022-05-17 RX ORDER — LEVOTHYROXINE SODIUM 0.07 MG/1
TABLET ORAL
Qty: 90 TABLET | Refills: 0 | Status: SHIPPED | OUTPATIENT
Start: 2022-05-17

## 2022-05-25 ENCOUNTER — LAB ENCOUNTER (OUTPATIENT)
Dept: LAB | Age: 72
End: 2022-05-25
Attending: UROLOGY
Payer: MEDICARE

## 2022-05-25 DIAGNOSIS — N39.0 RECURRENT UTI: ICD-10-CM

## 2022-05-25 LAB
BILIRUB UR QL: NEGATIVE
COLOR UR: YELLOW
GLUCOSE UR-MCNC: NEGATIVE MG/DL
HGB UR QL STRIP.AUTO: NEGATIVE
KETONES UR-MCNC: NEGATIVE MG/DL
NITRITE UR QL STRIP.AUTO: NEGATIVE
PH UR: 6 [PH] (ref 5–8)
PROT UR-MCNC: 30 MG/DL
SP GR UR STRIP: 1.02 (ref 1–1.03)
UROBILINOGEN UR STRIP-ACNC: <2
VIT C UR-MCNC: 40 MG/DL
WBC #/AREA URNS AUTO: >50 /HPF
WBC CLUMPS UR QL AUTO: PRESENT /HPF

## 2022-05-25 PROCEDURE — 81001 URINALYSIS AUTO W/SCOPE: CPT

## 2022-05-25 PROCEDURE — 87086 URINE CULTURE/COLONY COUNT: CPT

## 2022-05-25 PROCEDURE — 87077 CULTURE AEROBIC IDENTIFY: CPT

## 2022-05-25 PROCEDURE — 87186 SC STD MICRODIL/AGAR DIL: CPT

## 2022-05-25 RX ORDER — METHENAMINE HIPPURATE 1000 MG/1
TABLET ORAL
Qty: 180 TABLET | Refills: 3 | Status: SHIPPED | OUTPATIENT
Start: 2022-05-25

## 2022-05-27 RX ORDER — CEFADROXIL 500 MG/1
500 CAPSULE ORAL 2 TIMES DAILY
Qty: 14 CAPSULE | Refills: 0 | Status: SHIPPED | OUTPATIENT
Start: 2022-05-27 | End: 2022-06-03

## 2022-06-07 ENCOUNTER — OFFICE VISIT (OUTPATIENT)
Dept: INTERNAL MEDICINE CLINIC | Facility: CLINIC | Age: 72
End: 2022-06-07
Payer: MEDICARE

## 2022-06-07 VITALS
DIASTOLIC BLOOD PRESSURE: 80 MMHG | WEIGHT: 147 LBS | OXYGEN SATURATION: 98 % | HEIGHT: 59 IN | BODY MASS INDEX: 29.64 KG/M2 | TEMPERATURE: 97 F | SYSTOLIC BLOOD PRESSURE: 132 MMHG | HEART RATE: 101 BPM

## 2022-06-07 DIAGNOSIS — M17.12 PRIMARY OSTEOARTHRITIS OF LEFT KNEE: ICD-10-CM

## 2022-06-07 DIAGNOSIS — I10 ESSENTIAL HYPERTENSION: Primary | ICD-10-CM

## 2022-06-07 DIAGNOSIS — N13.5 URETERAL STRICTURE, RIGHT: ICD-10-CM

## 2022-06-07 DIAGNOSIS — E03.9 ACQUIRED HYPOTHYROIDISM: ICD-10-CM

## 2022-06-07 DIAGNOSIS — N39.0 RECURRENT UTI: ICD-10-CM

## 2022-06-07 DIAGNOSIS — I48.21 PERMANENT ATRIAL FIBRILLATION (HCC): ICD-10-CM

## 2022-06-07 DIAGNOSIS — E78.00 HYPERCHOLESTEROLEMIA: ICD-10-CM

## 2022-06-07 PROCEDURE — 99204 OFFICE O/P NEW MOD 45 MIN: CPT | Performed by: INTERNAL MEDICINE

## 2022-06-07 RX ORDER — PREDNISONE 1 MG/1
5 TABLET ORAL DAILY
COMMUNITY
Start: 2022-03-31 | End: 2022-06-07 | Stop reason: ALTCHOICE

## 2022-06-07 RX ORDER — FEXOFENADINE HCL AND PSEUDOEPHEDRINE HCI 180; 240 MG/1; MG/1
1 TABLET, EXTENDED RELEASE ORAL DAILY
Qty: 90 TABLET | Refills: 2 | Status: SHIPPED
Start: 2022-06-07

## 2022-06-07 RX ORDER — LEVOTHYROXINE SODIUM 0.07 MG/1
TABLET ORAL
Qty: 90 TABLET | Refills: 3 | Status: SHIPPED | OUTPATIENT
Start: 2022-06-07

## 2022-06-07 RX ORDER — CEPHALEXIN 500 MG/1
500 CAPSULE ORAL 2 TIMES DAILY
Qty: 14 CAPSULE | Refills: 0 | Status: SHIPPED | OUTPATIENT
Start: 2022-06-07

## 2022-06-07 RX ORDER — ATORVASTATIN CALCIUM 20 MG/1
20 TABLET, FILM COATED ORAL NIGHTLY
Qty: 90 TABLET | Refills: 3 | Status: SHIPPED | OUTPATIENT
Start: 2022-06-07

## 2022-06-07 RX ORDER — METHENAMINE HIPPURATE 1000 MG/1
1 TABLET ORAL 2 TIMES DAILY
Qty: 180 TABLET | Refills: 3 | Status: CANCELLED | OUTPATIENT
Start: 2022-06-07

## 2022-06-07 RX ORDER — LISINOPRIL AND HYDROCHLOROTHIAZIDE 12.5; 1 MG/1; MG/1
1 TABLET ORAL DAILY
Qty: 30 TABLET | Refills: 2 | Status: SHIPPED | OUTPATIENT
Start: 2022-06-07

## 2022-08-25 ENCOUNTER — LAB ENCOUNTER (OUTPATIENT)
Dept: LAB | Age: 72
End: 2022-08-25
Attending: UROLOGY
Payer: MEDICARE

## 2022-08-25 DIAGNOSIS — N39.0 RECURRENT UTI: ICD-10-CM

## 2022-08-25 LAB
BILIRUB UR QL: NEGATIVE
COLOR UR: YELLOW
GLUCOSE UR-MCNC: NEGATIVE MG/DL
KETONES UR-MCNC: NEGATIVE MG/DL
NITRITE UR QL STRIP.AUTO: POSITIVE
PH UR: 6.5 [PH] (ref 5–8)
PROT UR-MCNC: NEGATIVE MG/DL
SP GR UR STRIP: 1.02 (ref 1–1.03)
UROBILINOGEN UR STRIP-ACNC: 0.2
WBC #/AREA URNS AUTO: >50 /HPF
WBC #/AREA URNS AUTO: >50 /HPF

## 2022-08-25 PROCEDURE — 81015 MICROSCOPIC EXAM OF URINE: CPT

## 2022-08-25 PROCEDURE — 87077 CULTURE AEROBIC IDENTIFY: CPT

## 2022-08-25 PROCEDURE — 87186 SC STD MICRODIL/AGAR DIL: CPT

## 2022-08-25 PROCEDURE — 87086 URINE CULTURE/COLONY COUNT: CPT

## 2022-08-25 PROCEDURE — 81001 URINALYSIS AUTO W/SCOPE: CPT

## 2022-08-26 ENCOUNTER — PATIENT MESSAGE (OUTPATIENT)
Dept: SURGERY | Facility: CLINIC | Age: 72
End: 2022-08-26

## 2022-08-26 RX ORDER — SULFAMETHOXAZOLE AND TRIMETHOPRIM 800; 160 MG/1; MG/1
1 TABLET ORAL 2 TIMES DAILY
Qty: 14 TABLET | Refills: 0 | Status: SHIPPED | OUTPATIENT
Start: 2022-08-26 | End: 2022-09-02

## 2022-08-26 RX ORDER — SULFAMETHOXAZOLE AND TRIMETHOPRIM 800; 160 MG/1; MG/1
1 TABLET ORAL 2 TIMES DAILY
Qty: 14 TABLET | Refills: 0 | Status: SHIPPED | OUTPATIENT
Start: 2022-08-26 | End: 2022-08-26

## 2022-08-26 NOTE — TELEPHONE ENCOUNTER
From: Jael Desir  To: Jose Angel Boyd MD  Sent: 8/26/2022 1:57 PM CDT  Subject: Test results needing medication? Received test results and will you be prescribing medication today so I will have it for the weekend?

## 2022-09-02 RX ORDER — LISINOPRIL AND HYDROCHLOROTHIAZIDE 12.5; 1 MG/1; MG/1
TABLET ORAL
Qty: 90 TABLET | Refills: 0 | Status: SHIPPED | OUTPATIENT
Start: 2022-09-02

## 2022-09-12 ENCOUNTER — TELEPHONE (OUTPATIENT)
Dept: ORTHOPEDICS CLINIC | Facility: CLINIC | Age: 72
End: 2022-09-12

## 2022-09-12 NOTE — TELEPHONE ENCOUNTER
Future Appointments   Date Time Provider Aaliyah Pineda   9/26/2022  3:20 PM Moises Lott MD [facility] ORTHO [facility] [place]   10/4/2022  9:00 AM Matty Major MD [address]     This patient is coming for TITO Knee. There was recent imaging done in epic from last 04/2022. Please advise if additional views are needed for this appt. Thanks.     Patient can be reached at [phone]

## 2022-09-26 ENCOUNTER — OFFICE VISIT (OUTPATIENT)
Dept: ORTHOPEDICS CLINIC | Facility: CLINIC | Age: 72
End: 2022-09-26

## 2022-09-26 VITALS — WEIGHT: 147 LBS | HEIGHT: 59 IN | BODY MASS INDEX: 29.64 KG/M2

## 2022-09-26 DIAGNOSIS — M17.12 PRIMARY OSTEOARTHRITIS OF LEFT KNEE: Primary | ICD-10-CM

## 2022-09-26 RX ORDER — TRIAMCINOLONE ACETONIDE 40 MG/ML
40 INJECTION, SUSPENSION INTRA-ARTICULAR; INTRAMUSCULAR ONCE
Status: COMPLETED | OUTPATIENT
Start: 2022-09-26 | End: 2022-09-26

## 2022-09-26 RX ADMIN — TRIAMCINOLONE ACETONIDE 40 MG: 40 INJECTION, SUSPENSION INTRA-ARTICULAR; INTRAMUSCULAR at 15:52:00

## 2022-10-03 PROBLEM — I49.5 SICK SINUS SYNDROME (HCC): Status: ACTIVE | Noted: 2019-12-19

## 2022-10-03 PROBLEM — E78.5 DYSLIPIDEMIA: Status: ACTIVE | Noted: 2019-12-19

## 2022-10-03 PROBLEM — M43.17 SPONDYLOLISTHESIS OF LUMBOSACRAL REGION: Status: ACTIVE | Noted: 2022-10-03

## 2022-10-03 PROBLEM — N32.0 BLADDER OUTLET OBSTRUCTION: Status: ACTIVE | Noted: 2022-10-03

## 2022-10-04 ENCOUNTER — OFFICE VISIT (OUTPATIENT)
Dept: INTERNAL MEDICINE CLINIC | Facility: CLINIC | Age: 72
End: 2022-10-04
Payer: MEDICARE

## 2022-10-04 VITALS
BODY MASS INDEX: 29.6 KG/M2 | HEIGHT: 58 IN | SYSTOLIC BLOOD PRESSURE: 110 MMHG | HEART RATE: 65 BPM | WEIGHT: 141 LBS | OXYGEN SATURATION: 98 % | DIASTOLIC BLOOD PRESSURE: 78 MMHG | TEMPERATURE: 97 F

## 2022-10-04 DIAGNOSIS — E03.9 ACQUIRED HYPOTHYROIDISM: ICD-10-CM

## 2022-10-04 DIAGNOSIS — Z23 NEED FOR VACCINATION: Primary | ICD-10-CM

## 2022-10-04 DIAGNOSIS — N32.0 BLADDER OUTLET OBSTRUCTION: ICD-10-CM

## 2022-10-04 DIAGNOSIS — E78.00 HYPERCHOLESTEROLEMIA: ICD-10-CM

## 2022-10-04 DIAGNOSIS — I10 ESSENTIAL HYPERTENSION: ICD-10-CM

## 2022-10-04 DIAGNOSIS — I49.5 SICK SINUS SYNDROME (HCC): ICD-10-CM

## 2022-10-04 DIAGNOSIS — I44.2 ATRIOVENTRICULAR BLOCK, COMPLETE (HCC): ICD-10-CM

## 2022-10-04 DIAGNOSIS — R25.2 HAND CRAMPS: ICD-10-CM

## 2022-10-04 DIAGNOSIS — I48.21 PERMANENT ATRIAL FIBRILLATION (HCC): ICD-10-CM

## 2022-10-04 DIAGNOSIS — E78.5 DYSLIPIDEMIA: ICD-10-CM

## 2022-10-04 DIAGNOSIS — M17.12 PRIMARY OSTEOARTHRITIS OF LEFT KNEE: ICD-10-CM

## 2022-10-04 DIAGNOSIS — M43.17 SPONDYLOLISTHESIS OF LUMBOSACRAL REGION: ICD-10-CM

## 2022-10-04 DIAGNOSIS — Z12.31 BREAST CANCER SCREENING BY MAMMOGRAM: ICD-10-CM

## 2022-10-04 LAB
ALBUMIN SERPL-MCNC: 3.7 G/DL (ref 3.4–5)
ALBUMIN/GLOB SERPL: 1 {RATIO} (ref 1–2)
ALP LIVER SERPL-CCNC: 83 U/L
ALT SERPL-CCNC: 30 U/L
ANION GAP SERPL CALC-SCNC: 6 MMOL/L (ref 0–18)
AST SERPL-CCNC: 17 U/L (ref 15–37)
BILIRUB SERPL-MCNC: 0.5 MG/DL (ref 0.1–2)
BUN BLD-MCNC: 14 MG/DL (ref 7–18)
BUN/CREAT SERPL: 15.7 (ref 10–20)
CALCIUM BLD-MCNC: 8.9 MG/DL (ref 8.5–10.1)
CHLORIDE SERPL-SCNC: 106 MMOL/L (ref 98–112)
CHOLEST SERPL-MCNC: 179 MG/DL (ref ?–200)
CO2 SERPL-SCNC: 29 MMOL/L (ref 21–32)
CREAT BLD-MCNC: 0.89 MG/DL
FASTING PATIENT LIPID ANSWER: YES
FASTING STATUS PATIENT QL REPORTED: YES
GFR SERPLBLD BASED ON 1.73 SQ M-ARVRAT: 68 ML/MIN/1.73M2 (ref 60–?)
GLOBULIN PLAS-MCNC: 3.6 G/DL (ref 2.8–4.4)
GLUCOSE BLD-MCNC: 86 MG/DL (ref 70–99)
HDLC SERPL-MCNC: 58 MG/DL (ref 40–59)
LDLC SERPL CALC-MCNC: 108 MG/DL (ref ?–100)
NONHDLC SERPL-MCNC: 121 MG/DL (ref ?–130)
OSMOLALITY SERPL CALC.SUM OF ELEC: 292 MOSM/KG (ref 275–295)
POTASSIUM SERPL-SCNC: 3.9 MMOL/L (ref 3.5–5.1)
PROT SERPL-MCNC: 7.3 G/DL (ref 6.4–8.2)
SODIUM SERPL-SCNC: 141 MMOL/L (ref 136–145)
TRIGL SERPL-MCNC: 66 MG/DL (ref 30–149)
TSI SER-ACNC: 3.72 MIU/ML (ref 0.36–3.74)
VLDLC SERPL CALC-MCNC: 11 MG/DL (ref 0–30)

## 2022-10-04 PROCEDURE — 80061 LIPID PANEL: CPT | Performed by: INTERNAL MEDICINE

## 2022-10-04 PROCEDURE — 80053 COMPREHEN METABOLIC PANEL: CPT | Performed by: INTERNAL MEDICINE

## 2022-10-04 PROCEDURE — 84443 ASSAY THYROID STIM HORMONE: CPT | Performed by: INTERNAL MEDICINE

## 2022-10-04 NOTE — PROGRESS NOTES
Patient came in today to get labs done, R antecubital was used to collect blood without complications. Specimen sent to the lab. Patient came in today to get the influenza vaccine. Left deltoid was used for the vaccine. Patient tolerated vaccine well. Patient was given Pneumococcal 20 valent conjugate vaccine (Prevnar 20). Right deltoid was used for the injection at a 90 degree angle (intramuscular). Patient tolerated vaccine well.

## 2022-10-04 NOTE — ASSESSMENT & PLAN NOTE
Check labs  Keep on current medication DATE OF SERVICE: 11/28/2017     Chief Complaint   Patient presents with   â¢ Follow-up     1 YEAR FOLLOW UP ON;RIGHT TOTAL REVERSE SHOULDER ARTHROPLASTY - DOS 11/30/16. Patient states he is doing good, sometimes he will have some stiffness. HISTORY OF PRESENT ILLNESS:   The patient is a pleasant 64year old male who presents for one year follow-up for right total reverse shoulder arthroplasty, date of surgery 11/30/2016. Mehul Mendez states that his shoulder pain has markedly improved since his surgery. He does experience some stiffness at the shoulder, but believes that it may be due to decreased home exercises. REVIEW OF SYSTEMS:   No fevers, chills or night sweats. No other trauma. MEDICATIONS AND ALLERGIES:   Reviewed in Epic to date. PHYSICAL EXAMINATION:   GENERAL: Pleasant, in no acute distress. Alert and oriented x3. He appears well-developed and well-nourished. VITALS: There were no vitals taken for this visit. MUSCULOSKELETAL: No pain through ROM  NEUROLOGICAL: Sensation is intact. CARDIOVASCULAR: There is no clubbing, cyanosis or edema noted. Pulses are intact. SKIN: Warm, dry and intact. No lesions or abrasions. DIAGNOSTIC DATA:  XR SHOULDER 2 VW RIGHT from today (11/28/2017) was reviewed in [place]. ASSESSMENT AND PLAN:   This is a 64year old male with:    1) History of right reverse total shoulder arthroplasty. The patient was informed of the clinical and radiographic findings, showing stable shoulder arthroplasty. He was directed to use the shoulder and perform home exercises in an effort to strengthen the shoulder. If there are any questions or concerns prior to the next visit, he should feel free to give our office a call. Bhupinder Monzon acting as a scribe for Pb Alvarez MD. The above note represents Pb Alvarez MD evaluation of this patient.     The documentation recorded by the scribe accurately and completely reflects the service(s) I personally performed and the decisions made by me.

## 2022-10-07 ENCOUNTER — LAB ENCOUNTER (OUTPATIENT)
Dept: LAB | Age: 72
End: 2022-10-07
Attending: UROLOGY
Payer: MEDICARE

## 2022-10-07 DIAGNOSIS — N19 RENAL FAILURE, UNSPECIFIED CHRONICITY: ICD-10-CM

## 2022-10-07 DIAGNOSIS — N39.0 RECURRENT UTI: ICD-10-CM

## 2022-10-07 PROCEDURE — 87186 SC STD MICRODIL/AGAR DIL: CPT

## 2022-10-07 PROCEDURE — 87086 URINE CULTURE/COLONY COUNT: CPT

## 2022-10-07 PROCEDURE — 87077 CULTURE AEROBIC IDENTIFY: CPT

## 2022-10-10 ENCOUNTER — PATIENT MESSAGE (OUTPATIENT)
Dept: SURGERY | Facility: CLINIC | Age: 72
End: 2022-10-10

## 2022-10-11 ENCOUNTER — TELEPHONE (OUTPATIENT)
Dept: SURGERY | Facility: CLINIC | Age: 72
End: 2022-10-11

## 2022-10-11 RX ORDER — CEPHALEXIN 500 MG/1
500 CAPSULE ORAL 2 TIMES DAILY
Qty: 10 CAPSULE | Refills: 0 | Status: SHIPPED | OUTPATIENT
Start: 2022-10-11 | End: 2022-10-16

## 2022-10-11 NOTE — TELEPHONE ENCOUNTER
S/W pt and informed her of the results and instructions in [facility] ms as stated below and pt verbalized understanding and compliance.

## 2022-10-11 NOTE — TELEPHONE ENCOUNTER
----- Message from Fleeta Pallas, APRN sent at 10/11/2022  1:02 PM CDT -----  Please let patient know her urine culture is positive for bacteria. I am going to send Keflex 500 mg PO BID x 5 days to her pharmacy.

## 2022-10-11 NOTE — TELEPHONE ENCOUNTER
From: Joe Tran  To: Dong Simms MD  Sent: 10/10/2022 9:26 AM CDT  Subject: Urine Test    Received test results and I will need a prescription. If you call [facility] that is on file in [place] I will have them transfer it to [place] where I will be until 10/19. Thank you.

## 2022-10-22 ENCOUNTER — HOSPITAL ENCOUNTER (OUTPATIENT)
Dept: GENERAL RADIOLOGY | Facility: HOSPITAL | Age: 72
Discharge: HOME | End: 2022-10-22
Attending: INTERNAL MEDICINE
Payer: MEDICARE

## 2022-10-22 ENCOUNTER — NURSE ONLY (OUTPATIENT)
Dept: LAB | Facility: HOSPITAL | Age: 72
End: 2022-10-22
Attending: INTERNAL MEDICINE
Payer: MEDICARE

## 2022-10-22 DIAGNOSIS — Z01.818 PRE-OP TESTING: ICD-10-CM

## 2022-10-22 LAB
ANION GAP SERPL CALC-SCNC: 5 MMOL/L (ref 0–18)
BUN BLD-MCNC: 12 MG/DL (ref 7–18)
BUN/CREAT SERPL: 13.2 (ref 10–20)
CALCIUM BLD-MCNC: 9.5 MG/DL (ref 8.5–10.1)
CHLORIDE SERPL-SCNC: 104 MMOL/L (ref 98–112)
CO2 SERPL-SCNC: 30 MMOL/L (ref 21–32)
CREAT BLD-MCNC: 0.91 MG/DL
DEPRECATED RDW RBC AUTO: 44.7 FL (ref 35.1–46.3)
ERYTHROCYTE [DISTWIDTH] IN BLOOD BY AUTOMATED COUNT: 14 % (ref 11–15)
FASTING STATUS PATIENT QL REPORTED: YES
GFR SERPLBLD BASED ON 1.73 SQ M-ARVRAT: 66 ML/MIN/1.73M2 (ref 60–?)
GLUCOSE BLD-MCNC: 85 MG/DL (ref 70–99)
HCT VFR BLD AUTO: 42 %
HGB BLD-MCNC: 13.3 G/DL
MCH RBC QN AUTO: 27.5 PG (ref 26–34)
MCHC RBC AUTO-ENTMCNC: 31.7 G/DL (ref 31–37)
MCV RBC AUTO: 86.8 FL
MRSA DNA SPEC QL NAA+PROBE: NEGATIVE
OSMOLALITY SERPL CALC.SUM OF ELEC: 287 MOSM/KG (ref 275–295)
PLATELET # BLD AUTO: 302 10(3)UL (ref 150–450)
POTASSIUM SERPL-SCNC: 4 MMOL/L (ref 3.5–5.1)
RBC # BLD AUTO: 4.84 X10(6)UL
SARS-COV-2 RNA RESP QL NAA+PROBE: NOT DETECTED
SODIUM SERPL-SCNC: 139 MMOL/L (ref 136–145)
WBC # BLD AUTO: 5.4 X10(3) UL (ref 4–11)

## 2022-10-22 PROCEDURE — 71046 X-RAY EXAM CHEST 2 VIEWS: CPT | Performed by: INTERNAL MEDICINE

## 2022-10-22 PROCEDURE — 85027 COMPLETE CBC AUTOMATED: CPT

## 2022-10-22 PROCEDURE — 87641 MR-STAPH DNA AMP PROBE: CPT

## 2022-10-22 PROCEDURE — 80048 BASIC METABOLIC PNL TOTAL CA: CPT

## 2022-10-22 PROCEDURE — 36415 COLL VENOUS BLD VENIPUNCTURE: CPT

## 2022-10-25 ENCOUNTER — HOSPITAL ENCOUNTER (OUTPATIENT)
Dept: INTERVENTIONAL RADIOLOGY/VASCULAR | Facility: HOSPITAL | Age: 72
Discharge: HOME | End: 2022-10-25
Attending: INTERNAL MEDICINE | Admitting: INTERNAL MEDICINE
Payer: MEDICARE

## 2022-10-25 VITALS
RESPIRATION RATE: 18 BRPM | HEIGHT: 58 IN | DIASTOLIC BLOOD PRESSURE: 63 MMHG | BODY MASS INDEX: 29.6 KG/M2 | SYSTOLIC BLOOD PRESSURE: 94 MMHG | HEART RATE: 78 BPM | WEIGHT: 141 LBS | OXYGEN SATURATION: 94 %

## 2022-10-25 DIAGNOSIS — I49.5 SSS (SICK SINUS SYNDROME) (HCC): ICD-10-CM

## 2022-10-25 DIAGNOSIS — Z95.0 PRESENCE OF CARDIAC PACEMAKER: ICD-10-CM

## 2022-10-25 DIAGNOSIS — Z01.818 PRE-OP TESTING: Primary | ICD-10-CM

## 2022-10-25 DIAGNOSIS — Z45.010 PACEMAKER BATTERY DEPLETION: ICD-10-CM

## 2022-10-25 PROCEDURE — 0JPT0PZ REMOVAL OF CARDIAC RHYTHM RELATED DEVICE FROM TRUNK SUBCUTANEOUS TISSUE AND FASCIA, OPEN APPROACH: ICD-10-PCS | Performed by: INTERNAL MEDICINE

## 2022-10-25 PROCEDURE — 33228 REMV&REPLC PM GEN DUAL LEAD: CPT

## 2022-10-25 PROCEDURE — 0JWT0PZ REVISION OF CARDIAC RHYTHM RELATED DEVICE IN TRUNK SUBCUTANEOUS TISSUE AND FASCIA, OPEN APPROACH: ICD-10-PCS | Performed by: INTERNAL MEDICINE

## 2022-10-25 PROCEDURE — 0JH606Z INSERTION OF PACEMAKER, DUAL CHAMBER INTO CHEST SUBCUTANEOUS TISSUE AND FASCIA, OPEN APPROACH: ICD-10-PCS | Performed by: INTERNAL MEDICINE

## 2022-10-25 PROCEDURE — 36415 COLL VENOUS BLD VENIPUNCTURE: CPT

## 2022-10-25 RX ORDER — CHLORHEXIDINE GLUCONATE 4 G/100ML
30 SOLUTION TOPICAL
Status: DISCONTINUED | OUTPATIENT
Start: 2022-10-25 | End: 2022-10-25

## 2022-10-25 RX ORDER — LIDOCAINE HYDROCHLORIDE AND EPINEPHRINE 10; 10 MG/ML; UG/ML
INJECTION, SOLUTION INFILTRATION; PERINEURAL
Status: COMPLETED
Start: 2022-10-25 | End: 2022-10-25

## 2022-10-25 RX ORDER — CEFAZOLIN SODIUM/WATER 2 G/20 ML
SYRINGE (ML) INTRAVENOUS
Status: COMPLETED
Start: 2022-10-25 | End: 2022-10-25

## 2022-10-25 RX ORDER — SODIUM CHLORIDE 9 MG/ML
INJECTION, SOLUTION INTRAVENOUS
Status: COMPLETED | OUTPATIENT
Start: 2022-10-25 | End: 2022-10-25

## 2022-10-25 RX ADMIN — SODIUM CHLORIDE: 9 INJECTION, SOLUTION INTRAVENOUS at 12:30:00

## 2022-10-25 NOTE — IVS NOTE
DISCHARGE NOTE     Pt is able to sit up and ambulate without difficulty. Procedural site remains dry and intact with no signs and symptoms of bleeding/hematoma noted. IV access removed  Instruction provided, patient verbalizes understanding. Dr. Nanette Wilkinson spoke with patient post procedure.    Pt discharge ambulatory accompanied by  to Main Lobby   Follow up Appointment: as scheduled  New Prescription: None

## 2022-10-25 NOTE — PROCEDURES
OPERATION(S) PERFORMED:   1. Pacemaker implant dual.   2. Pacemaker generator removal dual.  3.  Pacemaker pocket revision to submuscular layer    : Gallito Hartmann MD  INDICATION: Device at [facility]. Heart block pacemaker dependent. Permanent atrial fibrillation. Continuous dual-chamber pacemaker to maintain MRI compatibility. Pocket pain from superficial pocket and thin skin. Patient requests pacemaker pocket extraction and new pocket prepectoral formation for deeper pacemaker to relieve pain discomfort of thin skin over previous pacemaker generator changes. COMPLICATIONS: None   SEDATION: local anesthesia  Patient requested no conscious sedation     ESTIMATED BLOOD LOSS: Minimal.    METHODS: The patient was brought to the outpatient cardiac telemetry unit in a fasting and nonsedated state after providing informed consent. IV sedation was administered during continuous ECG, pulse oximeter, and noninvasive hemodynamic monitoring. After administering 1% lidocaine for local anesthesia, an incision was adjacent to the previous incision. The plane of the incision was extended to expose the old generator and leads. The generator was removed from the pocket. The leads were visually inspected, there was no evidence of lead breakdown, the leads appeared to be intact. The pocket was shown to be superficial in the subcutaneous tissue which resulted in a very thin skin above the pocket and discomfort with leads and device corners. We then went to the prepectoral layer from her lateral incision and were able to perform new pocket prepectoral below the previous pocket and more inferior and medial.  With this new pocket bleeding was assessed and hemostasis achieved with Bovie cautery and flushed with antibiotic and saline solution. he leads were connected to a pulse generator. The leads were coiled and placed into the pocket along with the pulse generator. Once the new dual-chamber pacemaker was attached it was placed into this new pocket and the first running layer sutures was used to close this pocket so I did migrate up to the superficial pocket. The more superficial pocket was irrigated with antibiotic solution. Bleeding was sought for until hemostasis was achieved. T The incision was closed in total of 3 layers using 2 sets of 0-0 and and 1 4-0 Vicryl. Steri-Strips were applied followed by a sterile dressing. The left arm was placed in a sling and the patient was transported to telemetry in stable condition. There were no apparent intraoperative complications. MEASURED DATA: RA/RV leads were tested; normal impedence, sensing and threshold. CONCLUSIONS:   1. Status post successful generator change of a dual-chamber pacemaker. "[employer]   2. Status post pocket revision to submuscular layer for pocket pain and thin skin and previous generator changes   3. Stable RA/RV pacing, sensing and thresholds.      Blake Guzman, [facility]  Cardiac Electrophysiology  10/25/2022

## 2022-10-25 NOTE — INTERVAL H&P NOTE
Pre-op Diagnosis: * No pre-op diagnosis entered *    The above referenced H&P was reviewed by Vivian Cruz MD on 10/25/2022, the patient was examined and no significant changes have occurred in the patient's condition since the H&P was performed. I discussed with the patient and/or legal representative the potential benefits, risks and side effects of this procedure; the likelihood of the patient achieving goals; and potential problems that might occur during recuperation. I discussed reasonable alternatives to the procedure, including risks, benefits and side effects related to the alternatives and risks related to not receiving this procedure. We will proceed with procedure as planned.

## 2022-11-10 ENCOUNTER — LAB ENCOUNTER (OUTPATIENT)
Dept: LAB | Facility: HOSPITAL | Age: 72
End: 2022-11-10
Attending: UROLOGY
Payer: MEDICARE

## 2022-11-10 DIAGNOSIS — N39.0 RECURRENT UTI: ICD-10-CM

## 2022-11-10 LAB
BILIRUB UR QL: NEGATIVE
COLOR UR: YELLOW
GLUCOSE UR-MCNC: NEGATIVE MG/DL
HGB UR QL STRIP.AUTO: NEGATIVE
KETONES UR-MCNC: NEGATIVE MG/DL
NITRITE UR QL STRIP.AUTO: NEGATIVE
PH UR: 7 [PH] (ref 5–8)
PROT UR-MCNC: 30 MG/DL
SP GR UR STRIP: 1.02 (ref 1–1.03)
UROBILINOGEN UR STRIP-ACNC: <2
VIT C UR-MCNC: 40 MG/DL
WBC #/AREA URNS AUTO: >50 /HPF

## 2022-11-10 PROCEDURE — 87077 CULTURE AEROBIC IDENTIFY: CPT

## 2022-11-10 PROCEDURE — 87086 URINE CULTURE/COLONY COUNT: CPT

## 2022-11-10 PROCEDURE — 87186 SC STD MICRODIL/AGAR DIL: CPT

## 2022-11-10 PROCEDURE — 81001 URINALYSIS AUTO W/SCOPE: CPT

## 2022-11-11 RX ORDER — CEFADROXIL 1000 MG/1
1 TABLET ORAL 2 TIMES DAILY
Qty: 14 TABLET | Refills: 0 | Status: SHIPPED | OUTPATIENT
Start: 2022-11-11 | End: 2022-11-18

## 2022-11-29 RX ORDER — LISINOPRIL AND HYDROCHLOROTHIAZIDE 12.5; 1 MG/1; MG/1
TABLET ORAL
Qty: 90 TABLET | Refills: 0 | Status: SHIPPED | OUTPATIENT
Start: 2022-11-29

## 2022-12-13 ENCOUNTER — HOSPITAL ENCOUNTER (OUTPATIENT)
Dept: ULTRASOUND IMAGING | Facility: HOSPITAL | Age: 72
Discharge: HOME | End: 2022-12-13
Attending: UROLOGY
Payer: MEDICARE

## 2022-12-13 DIAGNOSIS — N13.30 HYDRONEPHROSIS, UNSPECIFIED HYDRONEPHROSIS TYPE: ICD-10-CM

## 2022-12-13 PROCEDURE — 76775 US EXAM ABDO BACK WALL LIM: CPT | Performed by: UROLOGY

## 2023-01-04 ENCOUNTER — OFFICE VISIT (OUTPATIENT)
Dept: INTERNAL MEDICINE CLINIC | Facility: CLINIC | Age: 73
End: 2023-01-04
Payer: MEDICARE

## 2023-01-04 VITALS
SYSTOLIC BLOOD PRESSURE: 138 MMHG | HEIGHT: 58 IN | BODY MASS INDEX: 29.39 KG/M2 | WEIGHT: 140 LBS | OXYGEN SATURATION: 95 % | DIASTOLIC BLOOD PRESSURE: 70 MMHG | HEART RATE: 71 BPM

## 2023-01-04 DIAGNOSIS — I48.21 PERMANENT ATRIAL FIBRILLATION (HCC): ICD-10-CM

## 2023-01-04 DIAGNOSIS — N13.5 URETERAL STRICTURE, RIGHT: ICD-10-CM

## 2023-01-04 DIAGNOSIS — Z78.9 HISTORY OF URINARY SELF-CATHETERIZATION: ICD-10-CM

## 2023-01-04 DIAGNOSIS — E03.9 ACQUIRED HYPOTHYROIDISM: ICD-10-CM

## 2023-01-04 DIAGNOSIS — N32.0 BLADDER OUTLET OBSTRUCTION: ICD-10-CM

## 2023-01-04 DIAGNOSIS — K62.5 BRIGHT RED RECTAL BLEEDING: ICD-10-CM

## 2023-01-04 DIAGNOSIS — I44.2 ATRIOVENTRICULAR BLOCK, COMPLETE (HCC): ICD-10-CM

## 2023-01-04 DIAGNOSIS — E78.5 DYSLIPIDEMIA: ICD-10-CM

## 2023-01-04 DIAGNOSIS — Z95.0 STATUS POST PLACEMENT OF CARDIAC PACEMAKER: ICD-10-CM

## 2023-01-04 DIAGNOSIS — I10 ESSENTIAL HYPERTENSION: Primary | ICD-10-CM

## 2023-01-04 PROCEDURE — 99214 OFFICE O/P EST MOD 30 MIN: CPT | Performed by: INTERNAL MEDICINE

## 2023-01-04 RX ORDER — MAGNESIUM CARB/ALUMINUM HYDROX 105-160MG
250 TABLET,CHEWABLE ORAL ONCE
COMMUNITY

## 2023-01-09 ENCOUNTER — LAB ENCOUNTER (OUTPATIENT)
Dept: LAB | Facility: HOSPITAL | Age: 73
End: 2023-01-09
Attending: UROLOGY
Payer: MEDICARE

## 2023-01-09 DIAGNOSIS — N39.0 RECURRENT UTI: ICD-10-CM

## 2023-01-09 LAB
BILIRUB UR QL: NEGATIVE
COLOR UR: YELLOW
GLUCOSE UR-MCNC: NEGATIVE MG/DL
HGB UR QL STRIP.AUTO: NEGATIVE
KETONES UR-MCNC: NEGATIVE MG/DL
NITRITE UR QL STRIP.AUTO: NEGATIVE
PH UR: 6 [PH] (ref 5–8)
PROT UR-MCNC: 30 MG/DL
SP GR UR STRIP: 1.02 (ref 1–1.03)
UROBILINOGEN UR STRIP-ACNC: <2
VIT C UR-MCNC: 40 MG/DL
WBC #/AREA URNS AUTO: >50 /HPF

## 2023-01-09 PROCEDURE — 81001 URINALYSIS AUTO W/SCOPE: CPT

## 2023-01-09 PROCEDURE — 87186 SC STD MICRODIL/AGAR DIL: CPT

## 2023-01-09 PROCEDURE — 87077 CULTURE AEROBIC IDENTIFY: CPT

## 2023-01-09 PROCEDURE — 87086 URINE CULTURE/COLONY COUNT: CPT

## 2023-01-10 ENCOUNTER — OFFICE VISIT (OUTPATIENT)
Dept: SURGERY | Facility: CLINIC | Age: 73
End: 2023-01-10
Payer: MEDICARE

## 2023-01-10 VITALS
DIASTOLIC BLOOD PRESSURE: 81 MMHG | SYSTOLIC BLOOD PRESSURE: 113 MMHG | WEIGHT: 140 LBS | BODY MASS INDEX: 29.39 KG/M2 | HEIGHT: 58 IN | HEART RATE: 81 BPM

## 2023-01-10 DIAGNOSIS — R15.9 FULL INCONTINENCE OF FECES: ICD-10-CM

## 2023-01-10 DIAGNOSIS — N39.0 URINARY TRACT INFECTION WITHOUT HEMATURIA, SITE UNSPECIFIED: Primary | ICD-10-CM

## 2023-01-10 DIAGNOSIS — N31.9 NEUROGENIC BLADDER: ICD-10-CM

## 2023-01-10 PROCEDURE — 99215 OFFICE O/P EST HI 40 MIN: CPT | Performed by: UROLOGY

## 2023-01-10 RX ORDER — ESTRADIOL 0.1 MG/G
1 CREAM VAGINAL DAILY
Qty: 42.5 G | Refills: 11 | Status: SHIPPED | OUTPATIENT
Start: 2023-01-10

## 2023-01-12 ENCOUNTER — TELEPHONE (OUTPATIENT)
Dept: SURGERY | Facility: CLINIC | Age: 73
End: 2023-01-12

## 2023-01-12 RX ORDER — NITROFURANTOIN 25; 75 MG/1; MG/1
100 CAPSULE ORAL 2 TIMES DAILY
Qty: 10 CAPSULE | Refills: 0 | Status: SHIPPED | OUTPATIENT
Start: 2023-01-12 | End: 2023-01-17

## 2023-01-12 NOTE — TELEPHONE ENCOUNTER
Hi all  Please let patient know that her urine culture grew E.coli and I wrote her for 5 days of macrobid. She can take this starting today. PS: she also had a question about dropping off extra urology supplies for any medical mission trips, if you could chat with her about that. Thanks!   AR    Cr 0.91/GFR66  No allergy

## 2023-01-13 NOTE — TELEPHONE ENCOUNTER
Tried to reach pton the cell # and the line rang and rang and then disconnected and no VM or msg came on. I then tried the HM #  and LMTCB.

## 2023-01-13 NOTE — TELEPHONE ENCOUNTER
I called pt and informed her of the results msg from [address] [place] [address] Anne [place] as stated below and she verbalized understanding and compliance and I told her that we could accept her extra medical supplies as long as the packages are not opened. She will drop them at our office next week.

## 2023-03-06 ENCOUNTER — PATIENT MESSAGE (OUTPATIENT)
Dept: INTERNAL MEDICINE CLINIC | Facility: CLINIC | Age: 73
End: 2023-03-06

## 2023-03-06 RX ORDER — LISINOPRIL AND HYDROCHLOROTHIAZIDE 12.5; 1 MG/1; MG/1
TABLET ORAL
Qty: 90 TABLET | Refills: 0 | Status: SHIPPED | OUTPATIENT
Start: 2023-03-06

## 2023-04-12 ENCOUNTER — TELEPHONE (OUTPATIENT)
Dept: SURGERY | Facility: CLINIC | Age: 73
End: 2023-04-12

## 2023-04-12 NOTE — TELEPHONE ENCOUNTER
Spoke with patient, I advised her that we had a couple of boxes of catheters with her name and address in our supply area. We confirmed that patient brought these boxes months ago for the providers to take on their mission trip. Patient states she has been updating Lisa Drummond via my chart that she is in [place] and had to have colostomy placed. She states she is staying done there until she has colostomy reversed.

## 2023-06-05 RX ORDER — LISINOPRIL AND HYDROCHLOROTHIAZIDE 12.5; 1 MG/1; MG/1
TABLET ORAL
Qty: 90 TABLET | Refills: 0 | Status: SHIPPED | OUTPATIENT
Start: 2023-06-05

## 2023-06-12 ENCOUNTER — TELEPHONE (OUTPATIENT)
Dept: INTERNAL MEDICINE CLINIC | Facility: CLINIC | Age: 73
End: 2023-06-12

## 2023-06-12 ENCOUNTER — LAB ENCOUNTER (OUTPATIENT)
Dept: LAB | Facility: HOSPITAL | Age: 73
End: 2023-06-12
Attending: UROLOGY
Payer: MEDICARE

## 2023-06-12 DIAGNOSIS — N39.0 URINARY TRACT INFECTION WITHOUT HEMATURIA, SITE UNSPECIFIED: ICD-10-CM

## 2023-06-12 LAB
BILIRUB UR QL: NEGATIVE
CLARITY UR: CLEAR
COLOR UR: YELLOW
GLUCOSE UR-MCNC: NORMAL MG/DL
HGB UR QL STRIP.AUTO: NEGATIVE
KETONES UR-MCNC: NEGATIVE MG/DL
LEUKOCYTE ESTERASE UR QL STRIP.AUTO: 75
NITRITE UR QL STRIP.AUTO: NEGATIVE
PH UR: 5.5 [PH] (ref 5–8)
PROT UR-MCNC: NEGATIVE MG/DL
SP GR UR STRIP: 1.02 (ref 1–1.03)
UROBILINOGEN UR STRIP-ACNC: NORMAL

## 2023-06-12 PROCEDURE — 87086 URINE CULTURE/COLONY COUNT: CPT

## 2023-06-12 PROCEDURE — 81001 URINALYSIS AUTO W/SCOPE: CPT

## 2023-06-12 RX ORDER — LEVOTHYROXINE SODIUM 0.07 MG/1
TABLET ORAL
Qty: 90 TABLET | Refills: 0 | OUTPATIENT
Start: 2023-06-12

## 2023-06-12 RX ORDER — LISINOPRIL AND HYDROCHLOROTHIAZIDE 12.5; 1 MG/1; MG/1
1 TABLET ORAL DAILY
Qty: 90 TABLET | Refills: 0 | OUTPATIENT
Start: 2023-06-12

## 2023-06-12 RX ORDER — ATORVASTATIN CALCIUM 20 MG/1
20 TABLET, FILM COATED ORAL NIGHTLY
Qty: 90 TABLET | Refills: 0 | OUTPATIENT
Start: 2023-06-12

## 2023-06-12 NOTE — TELEPHONE ENCOUNTER
Pharmacy is requesting refills on patient's maintenance medications. Patient is overdue for a follow-up.

## 2023-06-13 ENCOUNTER — OFFICE VISIT (OUTPATIENT)
Dept: INTERNAL MEDICINE CLINIC | Facility: CLINIC | Age: 73
End: 2023-06-13
Payer: MEDICARE

## 2023-06-13 VITALS
OXYGEN SATURATION: 97 % | HEART RATE: 63 BPM | BODY MASS INDEX: 28 KG/M2 | WEIGHT: 134 LBS | DIASTOLIC BLOOD PRESSURE: 70 MMHG | SYSTOLIC BLOOD PRESSURE: 132 MMHG | TEMPERATURE: 97 F

## 2023-06-13 DIAGNOSIS — I10 ESSENTIAL HYPERTENSION: ICD-10-CM

## 2023-06-13 DIAGNOSIS — I48.21 PERMANENT ATRIAL FIBRILLATION (HCC): ICD-10-CM

## 2023-06-13 DIAGNOSIS — N32.0 BLADDER OUTLET OBSTRUCTION: ICD-10-CM

## 2023-06-13 DIAGNOSIS — K57.20 DIVERTICULITIS OF LARGE INTESTINE WITH PERFORATION WITHOUT BLEEDING: Primary | ICD-10-CM

## 2023-06-13 DIAGNOSIS — N18.31 STAGE 3A CHRONIC KIDNEY DISEASE (HCC): ICD-10-CM

## 2023-06-13 DIAGNOSIS — Z78.9 HISTORY OF URINARY SELF-CATHETERIZATION: ICD-10-CM

## 2023-06-13 DIAGNOSIS — Z93.3 S/P COLOSTOMY (HCC): ICD-10-CM

## 2023-06-13 DIAGNOSIS — E03.9 ACQUIRED HYPOTHYROIDISM: ICD-10-CM

## 2023-06-13 PROCEDURE — 99214 OFFICE O/P EST MOD 30 MIN: CPT | Performed by: INTERNAL MEDICINE

## 2023-06-13 RX ORDER — LEVOTHYROXINE SODIUM 0.07 MG/1
TABLET ORAL
Qty: 90 TABLET | Refills: 3 | Status: SHIPPED | OUTPATIENT
Start: 2023-06-13

## 2023-06-13 RX ORDER — ATORVASTATIN CALCIUM 20 MG/1
20 TABLET, FILM COATED ORAL NIGHTLY
Qty: 90 TABLET | Refills: 3 | Status: SHIPPED | OUTPATIENT
Start: 2023-06-13

## 2023-06-19 ENCOUNTER — LAB ENCOUNTER (OUTPATIENT)
Dept: LAB | Facility: HOSPITAL | Age: 73
End: 2023-06-19
Attending: UROLOGY
Payer: MEDICARE

## 2023-06-19 DIAGNOSIS — N39.0 URINARY TRACT INFECTION WITHOUT HEMATURIA, SITE UNSPECIFIED: ICD-10-CM

## 2023-06-19 LAB
BILIRUB UR QL: NEGATIVE
COLOR UR: YELLOW
GLUCOSE UR-MCNC: NEGATIVE MG/DL
HGB UR QL STRIP.AUTO: NEGATIVE
KETONES UR-MCNC: NEGATIVE MG/DL
NITRITE UR QL STRIP.AUTO: POSITIVE
PH UR: 7 [PH] (ref 5–8)
SP GR UR STRIP: 1.02 (ref 1–1.03)
UROBILINOGEN UR STRIP-ACNC: 0.2
WBC #/AREA URNS AUTO: >50 /HPF
WBC #/AREA URNS AUTO: >50 /HPF

## 2023-06-19 PROCEDURE — 81001 URINALYSIS AUTO W/SCOPE: CPT

## 2023-06-19 PROCEDURE — 87086 URINE CULTURE/COLONY COUNT: CPT

## 2023-06-19 PROCEDURE — 81015 MICROSCOPIC EXAM OF URINE: CPT

## 2023-08-01 ENCOUNTER — OFFICE VISIT (OUTPATIENT)
Dept: INTERNAL MEDICINE CLINIC | Facility: CLINIC | Age: 73
End: 2023-08-01
Payer: MEDICARE

## 2023-08-01 ENCOUNTER — LAB ENCOUNTER (OUTPATIENT)
Dept: LAB | Facility: HOSPITAL | Age: 73
End: 2023-08-01
Attending: INTERNAL MEDICINE
Payer: MEDICARE

## 2023-08-01 VITALS
WEIGHT: 139 LBS | SYSTOLIC BLOOD PRESSURE: 118 MMHG | HEART RATE: 92 BPM | BODY MASS INDEX: 29 KG/M2 | TEMPERATURE: 98 F | OXYGEN SATURATION: 99 % | DIASTOLIC BLOOD PRESSURE: 74 MMHG

## 2023-08-01 DIAGNOSIS — Z93.3 S/P COLOSTOMY (HCC): ICD-10-CM

## 2023-08-01 DIAGNOSIS — N39.0 URINARY TRACT INFECTION WITHOUT HEMATURIA, SITE UNSPECIFIED: ICD-10-CM

## 2023-08-01 DIAGNOSIS — I10 ESSENTIAL HYPERTENSION: Primary | ICD-10-CM

## 2023-08-01 DIAGNOSIS — Z79.01 CHRONIC ANTICOAGULATION: ICD-10-CM

## 2023-08-01 DIAGNOSIS — E03.9 ACQUIRED HYPOTHYROIDISM: ICD-10-CM

## 2023-08-01 DIAGNOSIS — I48.21 PERMANENT ATRIAL FIBRILLATION (HCC): ICD-10-CM

## 2023-08-01 DIAGNOSIS — E78.5 DYSLIPIDEMIA: ICD-10-CM

## 2023-08-01 DIAGNOSIS — R25.2 HAND CRAMPS: ICD-10-CM

## 2023-08-01 DIAGNOSIS — G57.11 MERALGIA PARESTHETICA OF RIGHT SIDE: ICD-10-CM

## 2023-08-01 DIAGNOSIS — N32.0 BLADDER OUTLET OBSTRUCTION: ICD-10-CM

## 2023-08-01 PROBLEM — N18.31 STAGE 3A CHRONIC KIDNEY DISEASE (HCC): Status: RESOLVED | Noted: 2023-06-13 | Resolved: 2023-08-01

## 2023-08-01 LAB
ALBUMIN SERPL-MCNC: 3.5 G/DL (ref 3.4–5)
ALBUMIN/GLOB SERPL: 0.9 {RATIO} (ref 1–2)
ALP LIVER SERPL-CCNC: 73 U/L
ALT SERPL-CCNC: 24 U/L
ANION GAP SERPL CALC-SCNC: 6 MMOL/L (ref 0–18)
AST SERPL-CCNC: 19 U/L (ref 15–37)
BASOPHILS # BLD AUTO: 0.03 X10(3) UL (ref 0–0.2)
BASOPHILS NFR BLD AUTO: 0.6 %
BILIRUB SERPL-MCNC: 0.5 MG/DL (ref 0.1–2)
BILIRUB UR QL: NEGATIVE
BUN BLD-MCNC: 15 MG/DL (ref 7–18)
BUN/CREAT SERPL: 18.8 (ref 10–20)
CALCIUM BLD-MCNC: 9.5 MG/DL (ref 8.5–10.1)
CHLORIDE SERPL-SCNC: 106 MMOL/L (ref 98–112)
CHOLEST SERPL-MCNC: 160 MG/DL (ref ?–200)
CLARITY UR: CLEAR
CO2 SERPL-SCNC: 29 MMOL/L (ref 21–32)
COLOR UR: YELLOW
CREAT BLD-MCNC: 0.8 MG/DL
DEPRECATED RDW RBC AUTO: 45 FL (ref 35.1–46.3)
EGFRCR SERPLBLD CKD-EPI 2021: 77 ML/MIN/1.73M2 (ref 60–?)
EOSINOPHIL # BLD AUTO: 0.12 X10(3) UL (ref 0–0.7)
EOSINOPHIL NFR BLD AUTO: 2.4 %
ERYTHROCYTE [DISTWIDTH] IN BLOOD BY AUTOMATED COUNT: 14.6 % (ref 11–15)
FASTING PATIENT LIPID ANSWER: YES
FASTING STATUS PATIENT QL REPORTED: YES
GLOBULIN PLAS-MCNC: 3.7 G/DL (ref 2.8–4.4)
GLUCOSE BLD-MCNC: 87 MG/DL (ref 70–99)
GLUCOSE UR-MCNC: NORMAL MG/DL
HCT VFR BLD AUTO: 41.8 %
HDLC SERPL-MCNC: 57 MG/DL (ref 40–59)
HGB BLD-MCNC: 13.4 G/DL
HGB UR QL STRIP.AUTO: NEGATIVE
IMM GRANULOCYTES # BLD AUTO: 0.01 X10(3) UL (ref 0–1)
IMM GRANULOCYTES NFR BLD: 0.2 %
KETONES UR-MCNC: NEGATIVE MG/DL
LDLC SERPL CALC-MCNC: 87 MG/DL (ref ?–100)
LEUKOCYTE ESTERASE UR QL STRIP.AUTO: 75
LYMPHOCYTES # BLD AUTO: 1.21 X10(3) UL (ref 1–4)
LYMPHOCYTES NFR BLD AUTO: 23.8 %
MCH RBC QN AUTO: 27.1 PG (ref 26–34)
MCHC RBC AUTO-ENTMCNC: 32.1 G/DL (ref 31–37)
MCV RBC AUTO: 84.6 FL
MONOCYTES # BLD AUTO: 0.46 X10(3) UL (ref 0.1–1)
MONOCYTES NFR BLD AUTO: 9 %
NEUTROPHILS # BLD AUTO: 3.26 X10 (3) UL (ref 1.5–7.7)
NEUTROPHILS # BLD AUTO: 3.26 X10(3) UL (ref 1.5–7.7)
NEUTROPHILS NFR BLD AUTO: 64 %
NITRITE UR QL STRIP.AUTO: NEGATIVE
NONHDLC SERPL-MCNC: 103 MG/DL (ref ?–130)
OSMOLALITY SERPL CALC.SUM OF ELEC: 292 MOSM/KG (ref 275–295)
PH UR: 6 [PH] (ref 5–8)
PLATELET # BLD AUTO: 219 10(3)UL (ref 150–450)
POTASSIUM SERPL-SCNC: 4.4 MMOL/L (ref 3.5–5.1)
PROT SERPL-MCNC: 7.2 G/DL (ref 6.4–8.2)
RBC # BLD AUTO: 4.94 X10(6)UL
SODIUM SERPL-SCNC: 141 MMOL/L (ref 136–145)
SP GR UR STRIP: 1.02 (ref 1–1.03)
TRIGL SERPL-MCNC: 88 MG/DL (ref 30–149)
TSI SER-ACNC: 2.36 MIU/ML (ref 0.36–3.74)
UROBILINOGEN UR STRIP-ACNC: NORMAL
VLDLC SERPL CALC-MCNC: 14 MG/DL (ref 0–30)
WBC # BLD AUTO: 5.1 X10(3) UL (ref 4–11)

## 2023-08-01 PROCEDURE — 85025 COMPLETE CBC W/AUTO DIFF WBC: CPT | Performed by: INTERNAL MEDICINE

## 2023-08-01 PROCEDURE — 80053 COMPREHEN METABOLIC PANEL: CPT | Performed by: INTERNAL MEDICINE

## 2023-08-01 PROCEDURE — 81001 URINALYSIS AUTO W/SCOPE: CPT

## 2023-08-01 PROCEDURE — 87077 CULTURE AEROBIC IDENTIFY: CPT

## 2023-08-01 PROCEDURE — 99214 OFFICE O/P EST MOD 30 MIN: CPT | Performed by: INTERNAL MEDICINE

## 2023-08-01 PROCEDURE — 84443 ASSAY THYROID STIM HORMONE: CPT | Performed by: INTERNAL MEDICINE

## 2023-08-01 PROCEDURE — 36415 COLL VENOUS BLD VENIPUNCTURE: CPT | Performed by: INTERNAL MEDICINE

## 2023-08-01 PROCEDURE — 87086 URINE CULTURE/COLONY COUNT: CPT

## 2023-08-01 PROCEDURE — 87186 SC STD MICRODIL/AGAR DIL: CPT

## 2023-08-01 PROCEDURE — 80061 LIPID PANEL: CPT | Performed by: INTERNAL MEDICINE

## 2023-08-01 PROCEDURE — 1126F AMNT PAIN NOTED NONE PRSNT: CPT | Performed by: INTERNAL MEDICINE

## 2023-08-02 ENCOUNTER — OFFICE VISIT (OUTPATIENT)
Dept: SURGERY | Facility: CLINIC | Age: 73
End: 2023-08-02

## 2023-08-02 DIAGNOSIS — R33.9 URINARY RETENTION: Primary | ICD-10-CM

## 2023-08-02 DIAGNOSIS — N39.0 RECURRENT UTI: ICD-10-CM

## 2023-08-02 PROCEDURE — 99214 OFFICE O/P EST MOD 30 MIN: CPT | Performed by: UROLOGY

## 2023-08-02 PROCEDURE — 1126F AMNT PAIN NOTED NONE PRSNT: CPT | Performed by: UROLOGY

## 2023-08-02 RX ORDER — ESTRADIOL 0.1 MG/G
CREAM VAGINAL
Qty: 42.5 G | Refills: 11 | Status: SHIPPED | OUTPATIENT
Start: 2023-08-02

## 2023-08-02 NOTE — PROGRESS NOTES
Darylene Space, MD  Department of Urology  [facility].[place]    T: [phone]  F: [phone]    To: MD Delmi Ramirez [place] [id number]    Re: Joe Stern   MRN: [id number]  : 1950    Dear Barb Sow MD,    Today I had the pleasure of seeing Joe Stern in my clinic. As you know, Ms. Jason Al is a pleasant 74year old year old female who I am seeing for follow-up of urinary tract infections. Patient was last seen in this department on 1/10/2023. Briefly,  patient is formally followed by Dr. Jacky Berman for right-sided hydroureteronephrosis. She has had a nuclear medicine scan in  that demonstrated a function of 15% on the right side with delayed excretion parameters. She did undergo balloon dilation of her right ureteral stricture and evaluation for malignancy all of which was benign. Last renal imaging was 2022 that demonstrates no hydroureteronephrosis bilaterally and stable mild. Please note that she also has neurogenic bladder and catheterizes 4 times a day. She apparently has had a urodynamic evaluation at [facility] which I do not have the results. Per chart review it appears that she has a large bladder capacity with normal compliance, no reflux and detrusor underactivity. She has previously been offered InterStim but was deemed to not be a candidate given low success rate with her condition. At last visit we discussed behavioral management for UTI prevention. She has gone through a lot over the last 6 months including a perforated bowel requiring colostomy and revision. She feels overall on the mend. She does have a positive culture recently but feels that her urine is only slightly cloudy occasionally. PAST MEDICAL HISTORY:  Past Medical History:   Diagnosis Date    A-fib ([facility] [address]     managed    Abnormal cholesterol test     CHOLESTEROL  PER. NG.     Arrhythmia Atrial Fibrillation    Arthritis     Back problem     Cataract     Disorder of thyroid     H/O complete eye exam 1997    H/O diagnostic mammography [id number]    H/O exercise stress test 1995    High blood pressure     High cholesterol     HTN (hypertension)     Hyperlipidemia     Hyperlipidemia     Hypothyroidism     Migraines     Neurogenic bladder     Pacemaker     Pulmonary embolism ([address]     On patients HX client communicates no PE? Self-catheterizes urinary bladder     Visual impairment     glasses        PAST SURGICAL HISTORY:  Past Surgical History:   Procedure Laterality Date    CARPAL TUNNEL RELEASE      COLONOSCOPY  2010    COLONOSCOPY N/A 8/13/2020    Procedure: COLONOSCOPY;  Surgeon: Minerva oMrales MD;  Location: [facility] ENDOSCOPY    OTHER      Jaw surgery    OTHER Right     elbow surgery    OTHER Right     ankle surgery    PACEMAKER           ALLERGIES:    Levaquin [Levofloxa*    HIVES  Vancomycin              HIVES, OTHER (SEE COMMENTS)      MEDICATIONS:  Current Outpatient Medications   Medication Instructions    ascorbic acid (VITAMIN C) 250 mg, Oral, 2 times daily    atorvastatin (LIPITOR) 20 mg, Oral, Nightly    Calcium Carb-Cholecalciferol (CALCIUM 1000 + D OR) 1 tablet, Oral, 2 times daily    cholecalciferol (VITAMIN D3) 2,000 Units, Oral, Nightly    Echinacea-Goldenseal (ECHINACEA COMB/SAINI SEAL OR) Oral, Daily    estradiol (ESTRACE) 0.1 MG/GM Vaginal Cream Apply fingertip amount of cream to the vagina every night for 1 week and then every other night indefinitely    estradiol (ESTRACE) 1 g, Vaginal, Daily    levothyroxine 75 MCG Oral Tab TAKE 1 TABLET BY MOUTH EVERY DAY BEFORE BREAKFAST    LISINOPRIL-HYDROCHLOROTHIAZIDE 10-12.5 MG Oral Tab TAKE 1 TABLET BY MOUTH EVERY DAY    magnesium citrate 1.745 GM/30ML Oral Solution 250 each, Oral, Once, Taking oral tabs twice daily    METHENAMINE 1 g Oral Tab TAKE 1 TABLET (1 GRAM) BY MOUTH TWICE A DAY WITH VITAMIN C.     Multiple Vitamins-Minerals (MULTIVITAMIN OR) 1 tablet, Oral, Nightly    rivaroxaban (XARELTO) 20 mg, Oral, Every evening    SUMAtriptan (IMITREX) 50 mg, Oral, Every 2 hour PRN        FAMILY HISTORY:  Family History   Problem Relation Age of Onset    Breast Cancer Other 29    Heart Disease Father     Lipids Father         HYPERLIPIDEMIA    Hypertension Father     Cancer Mother         STOMACH,COLON    Heart Disease Mother         SOCIAL HISTORY:  Social History     Socioeconomic History    Marital status:    Tobacco Use    Smoking status: Never    Smokeless tobacco: Never   Vaping Use    Vaping Use: Never used   Substance and Sexual Activity    Alcohol use: Not Currently     Comment: social    Drug use: No   Other Topics Concern    Caffeine Concern No     Comment: SODA 8OZ/DAY          PHYSICAL EXAMINATION:  There were no vitals filed for this visit. CONSTITUTIONAL: No apparent distress, cooperative and communicative  NEUROLOGIC: Alert and oriented   HEAD: Normocephalic, atraumatic   EYES: Sclera non-icteric   ENT: Hearing intact, moist mucous membranes   NECK: No obvious goiter or masses   RESPIRATORY: Normal respiratory effort, Nonlabored breathing on room air  SKIN: No evident rashes   ABDOMEN: Soft, nontender, nondistended, no rebound tenderness, no guarding, no masses      REVIEW OF SYSTEMS:    A comprehensive 10-point review of systems was completed. Pertinent positives and negatives are noted in the the HPI. LABORATORY DATA:  URINE CULTURE 10,000 - 50,000 CFU/ML Gram Negative Tyson Abnormal                  IMAGING REVIEW:  Narrative   PROCEDURE: US KIDNEYS (LAK=[id number])     COMPARISON: [facility], [facility]. for [facility], [address] [place] [address] (DCM=[id number]), 1/21/2022, 10:03 AM.     INDICATIONS: Hydronephrosis, unspecified hydronephrosis type     TECHNIQUE: Ultrasound examination was performed to visualize the kidneys and bladder. FINDINGS:  RIGHT KIDNEY: Normal.  Right kidney length is 8.3 cm. Normal echotexture.   No hydronephrosis, mass, calculus or perirenal fluid. LEFT KIDNEY: Normal.  Left kidney length is 10.6 cm. Normal echotexture. No hydronephrosis, mass, calculus or perirenal fluid. BLADDER: Normal.  No visible wall thickening, mass, or calculus. CONCLUSION:  1. No evidence of hydronephrosis. 2. Stable mild right renal atrophy. DICTATED BY (CST): Issac Louie MD ON 12/13/2022 AT 1:23 PM      FINALIZED BY (CST): Issac Louie MD ON 12/13/2022 AT 1:25 PM                    Result History       OTHER RELEVANT DATA:   none     IMPRESSION: 1. Right hydroureteronephrosis not resolved after dilation of ureteral stricture with no residual hydronephrosis seen on repeat renal bladder ultrasound. It is reassuring that her creatinine is at baseline. She can follow-up with her primary care physician for basic lab testing on an annual basis. If she has symptoms we can consider repeat imaging in 1 year or sooner. 2.  Neurogenic bladder with recurrent urinary tract infections in patient who catheterizes 4 times a day. I discussed catheterizing for volumes less than 300 cc and pursuing UTI prevention mechanisms as listed below. We talked about UTI prevention with continuing good hydration, starting a women's probiotic (lactobacillus), Ellura pills (pamphlet provided), bowel regimen (colace, senna, miralax), voiding before and after sexual activity and using pH balanced soaps. Can continue to check urine and treat when UCx is positive. If this persists,  can consider initiating low dose antibiotic prophylaxis for 6 months versus gentamicin irrigations if she would like a more local therapy. Estrace cream provided. For recurrent urinary tract infection we will hold off on treating as she is not having any cloudy or smelly urine. She feels that her fecal incontinence and hemorrhoids have improved after her recent colostomy.      PLAN:  Return to clinic as needed    Thank you for referring this very pleasant patient to my clinic. If you have any questions or concerns, please do not hesitate to contact me. Sincerely,  Katya Sagastume MD    30 minutes were spent on this patient at this visit obtaining a history, reviewing medical records, developing a treatment plan, counseling and discussing treatment strategy with patient, coordination of care and documentation. The [facility] makes medical notes available to patients in the interest of transparency. However, please be advised that this is a medical document. It is intended as a peer to peer communication. It is written in medical language and may contain abbreviations or verbiage that are technical and unfamiliar. It may appear blunt or direct. Medical documents are intended to carry relevant information, facts as evident, and the clinical opinion of the practitioner.

## 2023-08-03 ENCOUNTER — HOSPITAL ENCOUNTER (OUTPATIENT)
Dept: MAMMOGRAPHY | Facility: HOSPITAL | Age: 73
Discharge: HOME | End: 2023-08-03
Attending: INTERNAL MEDICINE
Payer: MEDICARE

## 2023-08-03 DIAGNOSIS — Z12.31 BREAST CANCER SCREENING BY MAMMOGRAM: ICD-10-CM

## 2023-08-03 PROCEDURE — 77067 SCR MAMMO BI INCL CAD: CPT | Performed by: INTERNAL MEDICINE

## 2023-08-03 PROCEDURE — 77063 BREAST TOMOSYNTHESIS BI: CPT | Performed by: INTERNAL MEDICINE

## 2023-08-07 ENCOUNTER — HOSPITAL ENCOUNTER (OUTPATIENT)
Dept: MAMMOGRAPHY | Facility: HOSPITAL | Age: 73
Discharge: HOME | End: 2023-08-07
Attending: INTERNAL MEDICINE
Payer: MEDICARE

## 2023-08-07 ENCOUNTER — TELEPHONE (OUTPATIENT)
Dept: SURGERY | Facility: CLINIC | Age: 73
End: 2023-08-07

## 2023-08-07 DIAGNOSIS — R92.8 ABNORMAL MAMMOGRAM: ICD-10-CM

## 2023-08-07 DIAGNOSIS — N39.0 RECURRENT UTI: Primary | ICD-10-CM

## 2023-08-07 PROCEDURE — 77061 BREAST TOMOSYNTHESIS UNI: CPT | Performed by: INTERNAL MEDICINE

## 2023-08-07 PROCEDURE — 77065 DX MAMMO INCL CAD UNI: CPT | Performed by: INTERNAL MEDICINE

## 2023-08-07 RX ORDER — SULFAMETHOXAZOLE AND TRIMETHOPRIM 800; 160 MG/1; MG/1
1 TABLET ORAL 2 TIMES DAILY
Qty: 6 TABLET | Refills: 0 | Status: SHIPPED | OUTPATIENT
Start: 2023-08-07 | End: 2023-08-10

## 2023-08-07 NOTE — TELEPHONE ENCOUNTER
----- Message from Yakov Fairchild sent at 8/7/2023 11:42 AM CDT -----  Regarding: UTI  Contact: [phone]  Yesterday when cathering, twice I had red urine in the tube. Does my UTI need to be treated? It has not appeared this morning but I will keep checking.

## 2023-08-07 NOTE — TELEPHONE ENCOUNTER
I s/w pt and determined that she noted that she had blood in her CIC cath yesterday on 2 different occasions but the urine is cloudy and foul smelling but not bloody. She performs self cath 5 times daily and today there was no blood. She does not urinate naturally only CIC, denies hx of kidney stones. I reviewed her past urine cultures and noted that AR did not treat the positive C&S of 8/3 because pt reported that she had no Symptoms on 8/1. Pt stated that this is why she sent the my chart msg because now she is having the blood in the caths. I told pt that I will ask AR if she wants to treat her now that she has new symptoms. I asked pt to hold. I s/w AR and she reviewed the pt's chart and her allergies and told me that she will send Bactrim DS 1 tab by mouth every 12 hrs for 3 days, # 6 no refills. I went back to the pt and informed her of this and she was thankful. I asked pt to call the office in the future when she has acute issues such a pain, bleeding, etc as the my chart messages are meant for non-urgent issues and do not get monitored as often as the phone calls. She verbalized understanding and compliance.

## 2023-09-05 RX ORDER — LISINOPRIL AND HYDROCHLOROTHIAZIDE 12.5; 1 MG/1; MG/1
1 TABLET ORAL DAILY
Qty: 90 TABLET | Refills: 0 | Status: SHIPPED | OUTPATIENT
Start: 2023-09-05

## 2023-09-20 ENCOUNTER — LAB ENCOUNTER (OUTPATIENT)
Dept: LAB | Facility: HOSPITAL | Age: 73
End: 2023-09-20
Attending: UROLOGY
Payer: MEDICARE

## 2023-09-20 DIAGNOSIS — R33.9 URINARY RETENTION: ICD-10-CM

## 2023-09-20 DIAGNOSIS — N39.0 RECURRENT UTI: ICD-10-CM

## 2023-09-20 LAB
BILIRUB UR QL: NEGATIVE
GLUCOSE UR-MCNC: NORMAL MG/DL
HGB UR QL STRIP.AUTO: NEGATIVE
KETONES UR-MCNC: NEGATIVE MG/DL
LEUKOCYTE ESTERASE UR QL STRIP.AUTO: 500
PH UR: 6 [PH] (ref 5–8)
PROT UR-MCNC: NEGATIVE MG/DL
SP GR UR STRIP: 1.01 (ref 1–1.03)
UROBILINOGEN UR STRIP-ACNC: NORMAL
WBC #/AREA URNS AUTO: >50 /HPF

## 2023-09-20 PROCEDURE — 87086 URINE CULTURE/COLONY COUNT: CPT

## 2023-09-20 PROCEDURE — 87077 CULTURE AEROBIC IDENTIFY: CPT

## 2023-09-20 PROCEDURE — 81001 URINALYSIS AUTO W/SCOPE: CPT

## 2023-09-20 PROCEDURE — 87186 SC STD MICRODIL/AGAR DIL: CPT

## 2023-09-21 RX ORDER — SULFAMETHOXAZOLE AND TRIMETHOPRIM 800; 160 MG/1; MG/1
1 TABLET ORAL 2 TIMES DAILY
Qty: 10 TABLET | Refills: 0 | Status: SHIPPED | OUTPATIENT
Start: 2023-09-21 | End: 2023-09-26

## 2023-09-22 ENCOUNTER — TELEPHONE (OUTPATIENT)
Dept: SURGERY | Facility: CLINIC | Age: 73
End: 2023-09-22

## 2023-09-22 NOTE — TELEPHONE ENCOUNTER
I called the patient and confirmed her full name and . I explained that her urine culture was positive and that Dr. Zoya Gandara had sent a prescription to [facility] in [address]. I reminded her to start taking the antibiotic today and finish all 5 days. The patient inquired about the culture results and I explained that the culture came back this morning and showed that the Bactrim DS is the best antibiotic for the organism. I confirmed this with Dr. Zoya Gandara. Pt verbalized her understanding. Encounter closed.

## 2023-09-22 NOTE — TELEPHONE ENCOUNTER
----- Message from Mary Rey MD sent at 9/21/2023 12:26 PM CDT -----  Please keep eye out for culture as I am out next week  I wrote for bactrim ds bid x 5 days

## 2023-10-30 ENCOUNTER — LAB ENCOUNTER (OUTPATIENT)
Dept: LAB | Facility: HOSPITAL | Age: 73
End: 2023-10-30
Attending: UROLOGY
Payer: MEDICARE

## 2023-10-30 DIAGNOSIS — R33.9 URINARY RETENTION: ICD-10-CM

## 2023-10-30 DIAGNOSIS — N39.0 RECURRENT UTI: ICD-10-CM

## 2023-10-30 LAB
BILIRUB UR QL: NEGATIVE
COLOR UR: YELLOW
GLUCOSE UR-MCNC: NORMAL MG/DL
KETONES UR-MCNC: NEGATIVE MG/DL
LEUKOCYTE ESTERASE UR QL STRIP.AUTO: 500
PH UR: 7 [PH] (ref 5–8)
PROT UR-MCNC: 20 MG/DL
SP GR UR STRIP: 1.01 (ref 1–1.03)
UROBILINOGEN UR STRIP-ACNC: NORMAL
WBC #/AREA URNS AUTO: >50 /HPF
WBC CLUMPS UR QL AUTO: PRESENT /HPF

## 2023-10-30 PROCEDURE — 87186 SC STD MICRODIL/AGAR DIL: CPT

## 2023-10-30 PROCEDURE — 81001 URINALYSIS AUTO W/SCOPE: CPT

## 2023-10-30 PROCEDURE — 87086 URINE CULTURE/COLONY COUNT: CPT

## 2023-10-30 PROCEDURE — 87077 CULTURE AEROBIC IDENTIFY: CPT

## 2023-10-31 RX ORDER — SULFAMETHOXAZOLE AND TRIMETHOPRIM 800; 160 MG/1; MG/1
1 TABLET ORAL 2 TIMES DAILY
Qty: 6 TABLET | Refills: 0 | Status: SHIPPED | OUTPATIENT
Start: 2023-10-31 | End: 2023-11-03

## 2023-11-01 ENCOUNTER — OFFICE VISIT (OUTPATIENT)
Dept: INTERNAL MEDICINE CLINIC | Facility: CLINIC | Age: 73
End: 2023-11-01
Payer: MEDICARE

## 2023-11-01 VITALS
BODY MASS INDEX: 30.86 KG/M2 | OXYGEN SATURATION: 98 % | WEIGHT: 147 LBS | HEIGHT: 58 IN | SYSTOLIC BLOOD PRESSURE: 128 MMHG | DIASTOLIC BLOOD PRESSURE: 68 MMHG | HEART RATE: 75 BPM

## 2023-11-01 DIAGNOSIS — L57.0 KERATOSIS: ICD-10-CM

## 2023-11-01 DIAGNOSIS — I48.21 PERMANENT ATRIAL FIBRILLATION (HCC): ICD-10-CM

## 2023-11-01 DIAGNOSIS — I44.2 ATRIOVENTRICULAR BLOCK, COMPLETE (HCC): ICD-10-CM

## 2023-11-01 DIAGNOSIS — M48.062 SPINAL STENOSIS OF LUMBAR REGION WITH NEUROGENIC CLAUDICATION: ICD-10-CM

## 2023-11-01 DIAGNOSIS — I10 ESSENTIAL HYPERTENSION: ICD-10-CM

## 2023-11-01 DIAGNOSIS — E78.5 DYSLIPIDEMIA: ICD-10-CM

## 2023-11-01 DIAGNOSIS — M65.312 TRIGGER FINGER OF LEFT THUMB: Primary | ICD-10-CM

## 2023-11-01 DIAGNOSIS — Z87.19 HISTORY OF DIVERTICULITIS: ICD-10-CM

## 2023-11-01 DIAGNOSIS — N32.0 BLADDER OUTLET OBSTRUCTION: ICD-10-CM

## 2023-11-01 DIAGNOSIS — E04.1 THYROID NODULE: ICD-10-CM

## 2023-11-01 DIAGNOSIS — N13.5 URETERAL STRICTURE, RIGHT: ICD-10-CM

## 2023-11-01 DIAGNOSIS — G43.109 MIGRAINE WITH AURA AND WITHOUT STATUS MIGRAINOSUS, NOT INTRACTABLE: ICD-10-CM

## 2023-11-01 DIAGNOSIS — I49.5 SICK SINUS SYNDROME (HCC): ICD-10-CM

## 2023-11-01 DIAGNOSIS — Z95.0 STATUS POST PLACEMENT OF CARDIAC PACEMAKER: ICD-10-CM

## 2023-11-01 DIAGNOSIS — Z78.9 HISTORY OF URINARY SELF-CATHETERIZATION: ICD-10-CM

## 2023-11-01 DIAGNOSIS — E03.9 ACQUIRED HYPOTHYROIDISM: ICD-10-CM

## 2023-11-01 DIAGNOSIS — R25.2 HAND CRAMPS: ICD-10-CM

## 2023-11-01 PROBLEM — M17.12 PRIMARY OSTEOARTHRITIS OF LEFT KNEE: Status: RESOLVED | Noted: 2022-10-04 | Resolved: 2023-11-01

## 2023-11-01 PROCEDURE — G0439 PPPS, SUBSEQ VISIT: HCPCS | Performed by: INTERNAL MEDICINE

## 2023-11-01 PROCEDURE — 1125F AMNT PAIN NOTED PAIN PRSNT: CPT | Performed by: INTERNAL MEDICINE

## 2023-11-01 RX ORDER — SUMATRIPTAN 50 MG/1
50 TABLET, FILM COATED ORAL EVERY 2 HOUR PRN
Qty: 9 TABLET | Refills: 2 | Status: SHIPPED | OUTPATIENT
Start: 2023-11-01

## 2023-11-08 ENCOUNTER — HOSPITAL ENCOUNTER (OUTPATIENT)
Dept: ULTRASOUND IMAGING | Facility: HOSPITAL | Age: 73
Discharge: HOME | End: 2023-11-08
Attending: INTERNAL MEDICINE
Payer: MEDICARE

## 2023-11-08 DIAGNOSIS — E04.1 THYROID NODULE: ICD-10-CM

## 2023-11-08 PROCEDURE — 76536 US EXAM OF HEAD AND NECK: CPT | Performed by: INTERNAL MEDICINE

## 2023-12-01 RX ORDER — LISINOPRIL AND HYDROCHLOROTHIAZIDE 12.5; 1 MG/1; MG/1
1 TABLET ORAL DAILY
Qty: 90 TABLET | Refills: 0 | Status: SHIPPED | OUTPATIENT
Start: 2023-12-01

## 2023-12-22 ENCOUNTER — LAB ENCOUNTER (OUTPATIENT)
Dept: LAB | Facility: HOSPITAL | Age: 73
End: 2023-12-22
Attending: UROLOGY
Payer: MEDICARE

## 2023-12-22 DIAGNOSIS — R33.9 URINARY RETENTION: ICD-10-CM

## 2023-12-22 DIAGNOSIS — N39.0 RECURRENT UTI: ICD-10-CM

## 2023-12-22 LAB
BILIRUB UR QL: NEGATIVE
GLUCOSE UR-MCNC: NORMAL MG/DL
HGB UR QL STRIP.AUTO: NEGATIVE
KETONES UR-MCNC: NEGATIVE MG/DL
LEUKOCYTE ESTERASE UR QL STRIP.AUTO: 500
PH UR: 6.5 [PH] (ref 5–8)
PROT UR-MCNC: NEGATIVE MG/DL
SP GR UR STRIP: 1.01 (ref 1–1.03)
UROBILINOGEN UR STRIP-ACNC: NORMAL
WBC #/AREA URNS AUTO: >50 /HPF
WBC CLUMPS UR QL AUTO: PRESENT /HPF

## 2023-12-22 PROCEDURE — 81001 URINALYSIS AUTO W/SCOPE: CPT

## 2023-12-22 PROCEDURE — 87086 URINE CULTURE/COLONY COUNT: CPT

## 2023-12-22 PROCEDURE — 87077 CULTURE AEROBIC IDENTIFY: CPT

## 2023-12-22 PROCEDURE — 87186 SC STD MICRODIL/AGAR DIL: CPT

## 2023-12-26 ENCOUNTER — TELEPHONE (OUTPATIENT)
Dept: SURGERY | Facility: CLINIC | Age: 73
End: 2023-12-26

## 2023-12-26 RX ORDER — SULFAMETHOXAZOLE AND TRIMETHOPRIM 800; 160 MG/1; MG/1
1 TABLET ORAL 2 TIMES DAILY
Qty: 10 TABLET | Refills: 0 | Status: SHIPPED | OUTPATIENT
Start: 2023-12-26 | End: 2023-12-31

## 2023-12-26 NOTE — TELEPHONE ENCOUNTER
- s/w pt; pt identity verified with name and   - read AR message to pt; pt acknowledged understanding and confirmed preferred pharmacy is [facility] in [address].  Pt denies any further questions. - encounter complete    ----- Message from Leonie New MD sent at 2023 10:01 AM CST -----  Wrote for bactrim, please let her know  See note    Hi Ms. Epstein   Your urine shows an infection. I will write you for bactrim for 5 days. Please stay hydrated as well.     Happy holidays  Dr. Anuj Parsons

## 2024-01-29 ENCOUNTER — LAB ENCOUNTER (OUTPATIENT)
Dept: LAB | Facility: HOSPITAL | Age: 74
End: 2024-01-29
Attending: UROLOGY
Payer: MEDICARE

## 2024-01-29 ENCOUNTER — PATIENT MESSAGE (OUTPATIENT)
Dept: SURGERY | Facility: CLINIC | Age: 74
End: 2024-01-29

## 2024-01-29 DIAGNOSIS — N39.0 RECURRENT UTI: ICD-10-CM

## 2024-01-29 DIAGNOSIS — R33.9 URINARY RETENTION: ICD-10-CM

## 2024-01-29 LAB
BILIRUB UR QL: NEGATIVE
COLOR UR: YELLOW
GLUCOSE UR-MCNC: NORMAL MG/DL
HGB UR QL STRIP.AUTO: NEGATIVE
KETONES UR-MCNC: NEGATIVE MG/DL
LEUKOCYTE ESTERASE UR QL STRIP.AUTO: 500
PH UR: 7 [PH] (ref 5–8)
SP GR UR STRIP: 1.01 (ref 1–1.03)
UROBILINOGEN UR STRIP-ACNC: NORMAL
WBC #/AREA URNS AUTO: >50 /HPF
WBC CLUMPS UR QL AUTO: PRESENT /HPF

## 2024-01-29 PROCEDURE — 87086 URINE CULTURE/COLONY COUNT: CPT

## 2024-01-29 PROCEDURE — 87186 SC STD MICRODIL/AGAR DIL: CPT

## 2024-01-29 PROCEDURE — 81001 URINALYSIS AUTO W/SCOPE: CPT

## 2024-01-29 PROCEDURE — 87077 CULTURE AEROBIC IDENTIFY: CPT

## 2024-01-29 NOTE — TELEPHONE ENCOUNTER
From: Heidy Epstein  To: Nayeli Deleon  Sent: 1/29/2024 1:03 PM CST  Subject: Medication     I just brought in a urine sample and if it is a UTI I need the medication sent to the [facility] in [place] on [address]. I am leaving for [place] tomorrow January 30th until the end of April. [phone]  Thank you!

## 2024-02-02 RX ORDER — SULFAMETHOXAZOLE AND TRIMETHOPRIM 800; 160 MG/1; MG/1
1 TABLET ORAL 2 TIMES DAILY
Qty: 10 TABLET | Refills: 0 | Status: SHIPPED | OUTPATIENT
Start: 2024-02-02 | End: 2024-02-07

## 2024-03-11 RX ORDER — LISINOPRIL AND HYDROCHLOROTHIAZIDE 12.5; 1 MG/1; MG/1
1 TABLET ORAL DAILY
Qty: 90 TABLET | Refills: 0 | Status: SHIPPED | OUTPATIENT
Start: 2024-03-11

## 2024-03-11 NOTE — TELEPHONE ENCOUNTER
Future Appt. 05/13/2024    Last Visit: 11/01/2023    Medication Requested:  Requested Prescriptions     Pending Prescriptions Disp Refills    LISINOPRIL-HYDROCHLOROTHIAZIDE 10-12.5 MG Oral Tab [Pharmacy Med Name: LISINOPRIL-HCTZ 10-12.5 MG TAB] 90 tablet 0     Sig: TAKE 1 TABLET BY MOUTH EVERY DAY        Last refill:      lisinopril-hydroCHLOROthiazide 10-12.5 MG Oral Tab 90 tablet 0 12/1/2023     Hypertension Medications Protocol [id number]/09/2024 12:25 AM   Protocol Details CMP or BMP in past 12 months    Last BP reading less than 140/90    In person appointment or virtual visit in the past 12 mos or appointment in next 3 mos    EGFRCR or GFRNAA > 50

## 2024-05-09 ENCOUNTER — LAB ENCOUNTER (OUTPATIENT)
Dept: LAB | Facility: HOSPITAL | Age: 74
End: 2024-05-09
Attending: UROLOGY
Payer: MEDICARE

## 2024-05-09 DIAGNOSIS — N39.0 RECURRENT UTI: ICD-10-CM

## 2024-05-09 DIAGNOSIS — R33.9 URINARY RETENTION: ICD-10-CM

## 2024-05-09 LAB
BILIRUB UR QL: NEGATIVE
COLOR UR: YELLOW
GLUCOSE UR-MCNC: NORMAL MG/DL
HGB UR QL STRIP.AUTO: NEGATIVE
KETONES UR-MCNC: NEGATIVE MG/DL
LEUKOCYTE ESTERASE UR QL STRIP.AUTO: 500
NITRITE UR QL STRIP.AUTO: NEGATIVE
PH UR: 6 [PH] (ref 5–8)
PROT UR-MCNC: 20 MG/DL
RBC #/AREA URNS AUTO: >10 /HPF
SP GR UR STRIP: 1.02 (ref 1–1.03)
UROBILINOGEN UR STRIP-ACNC: NORMAL
WBC #/AREA URNS AUTO: >50 /HPF
WBC CLUMPS UR QL AUTO: PRESENT /HPF

## 2024-05-09 PROCEDURE — 87086 URINE CULTURE/COLONY COUNT: CPT

## 2024-05-09 PROCEDURE — 87077 CULTURE AEROBIC IDENTIFY: CPT

## 2024-05-09 PROCEDURE — 87186 SC STD MICRODIL/AGAR DIL: CPT

## 2024-05-09 PROCEDURE — 81001 URINALYSIS AUTO W/SCOPE: CPT

## 2024-05-13 ENCOUNTER — OFFICE VISIT (OUTPATIENT)
Dept: INTERNAL MEDICINE CLINIC | Facility: CLINIC | Age: 74
End: 2024-05-13
Payer: MEDICARE

## 2024-05-13 ENCOUNTER — LAB ENCOUNTER (OUTPATIENT)
Dept: LAB | Facility: HOSPITAL | Age: 74
End: 2024-05-13
Attending: INTERNAL MEDICINE

## 2024-05-13 VITALS
BODY MASS INDEX: 32.95 KG/M2 | WEIGHT: 157 LBS | HEART RATE: 80 BPM | HEIGHT: 58 IN | OXYGEN SATURATION: 94 % | DIASTOLIC BLOOD PRESSURE: 70 MMHG | SYSTOLIC BLOOD PRESSURE: 118 MMHG

## 2024-05-13 DIAGNOSIS — I10 ESSENTIAL HYPERTENSION: ICD-10-CM

## 2024-05-13 DIAGNOSIS — N18.31 STAGE 3A CHRONIC KIDNEY DISEASE (HCC): ICD-10-CM

## 2024-05-13 DIAGNOSIS — Z79.01 CHRONIC ANTICOAGULATION: ICD-10-CM

## 2024-05-13 DIAGNOSIS — M25.471 RIGHT ANKLE SWELLING: ICD-10-CM

## 2024-05-13 DIAGNOSIS — E03.9 ACQUIRED HYPOTHYROIDISM: ICD-10-CM

## 2024-05-13 DIAGNOSIS — I49.5 SICK SINUS SYNDROME (HCC): ICD-10-CM

## 2024-05-13 DIAGNOSIS — K43.9 VENTRAL HERNIA WITHOUT OBSTRUCTION OR GANGRENE: Primary | ICD-10-CM

## 2024-05-13 LAB
ALBUMIN SERPL-MCNC: 4.7 G/DL (ref 3.2–4.8)
ALBUMIN/GLOB SERPL: 1.5 {RATIO} (ref 1–2)
ALP LIVER SERPL-CCNC: 80 U/L
ALT SERPL-CCNC: 22 U/L
ANION GAP SERPL CALC-SCNC: 7 MMOL/L (ref 0–18)
AST SERPL-CCNC: 23 U/L (ref ?–34)
BASOPHILS # BLD AUTO: 0.02 X10(3) UL (ref 0–0.2)
BASOPHILS NFR BLD AUTO: 0.3 %
BILIRUB SERPL-MCNC: 0.9 MG/DL (ref 0.2–1.1)
BUN BLD-MCNC: 11 MG/DL (ref 9–23)
BUN/CREAT SERPL: 11.7 (ref 10–20)
CALCIUM BLD-MCNC: 10 MG/DL (ref 8.7–10.4)
CHLORIDE SERPL-SCNC: 106 MMOL/L (ref 98–112)
CO2 SERPL-SCNC: 28 MMOL/L (ref 21–32)
CREAT BLD-MCNC: 0.94 MG/DL
DEPRECATED RDW RBC AUTO: 42.2 FL (ref 35.1–46.3)
EGFRCR SERPLBLD CKD-EPI 2021: 63 ML/MIN/1.73M2 (ref 60–?)
EOSINOPHIL # BLD AUTO: 0.08 X10(3) UL (ref 0–0.7)
EOSINOPHIL NFR BLD AUTO: 1.4 %
ERYTHROCYTE [DISTWIDTH] IN BLOOD BY AUTOMATED COUNT: 13.7 % (ref 11–15)
FASTING STATUS PATIENT QL REPORTED: YES
GLOBULIN PLAS-MCNC: 3.1 G/DL (ref 2–3.5)
GLUCOSE BLD-MCNC: 97 MG/DL (ref 70–99)
HCT VFR BLD AUTO: 44.3 %
HGB BLD-MCNC: 14.7 G/DL
IMM GRANULOCYTES # BLD AUTO: 0.01 X10(3) UL (ref 0–1)
IMM GRANULOCYTES NFR BLD: 0.2 %
LYMPHOCYTES # BLD AUTO: 1.25 X10(3) UL (ref 1–4)
LYMPHOCYTES NFR BLD AUTO: 21.9 %
MCH RBC QN AUTO: 27.7 PG (ref 26–34)
MCHC RBC AUTO-ENTMCNC: 33.2 G/DL (ref 31–37)
MCV RBC AUTO: 83.4 FL
MONOCYTES # BLD AUTO: 0.41 X10(3) UL (ref 0.1–1)
MONOCYTES NFR BLD AUTO: 7.2 %
NEUTROPHILS # BLD AUTO: 3.95 X10 (3) UL (ref 1.5–7.7)
NEUTROPHILS # BLD AUTO: 3.95 X10(3) UL (ref 1.5–7.7)
NEUTROPHILS NFR BLD AUTO: 69 %
OSMOLALITY SERPL CALC.SUM OF ELEC: 291 MOSM/KG (ref 275–295)
PLATELET # BLD AUTO: 211 10(3)UL (ref 150–450)
POTASSIUM SERPL-SCNC: 3.8 MMOL/L (ref 3.5–5.1)
PROT SERPL-MCNC: 7.8 G/DL (ref 5.7–8.2)
RBC # BLD AUTO: 5.31 X10(6)UL
SODIUM SERPL-SCNC: 141 MMOL/L (ref 136–145)
URATE SERPL-MCNC: 4.3 MG/DL
WBC # BLD AUTO: 5.7 X10(3) UL (ref 4–11)

## 2024-05-13 PROCEDURE — 36415 COLL VENOUS BLD VENIPUNCTURE: CPT

## 2024-05-13 PROCEDURE — 80053 COMPREHEN METABOLIC PANEL: CPT

## 2024-05-13 PROCEDURE — 99214 OFFICE O/P EST MOD 30 MIN: CPT | Performed by: INTERNAL MEDICINE

## 2024-05-13 PROCEDURE — 85025 COMPLETE CBC W/AUTO DIFF WBC: CPT

## 2024-05-13 PROCEDURE — 84550 ASSAY OF BLOOD/URIC ACID: CPT

## 2024-05-13 RX ORDER — SULFAMETHOXAZOLE AND TRIMETHOPRIM 800; 160 MG/1; MG/1
1 TABLET ORAL 2 TIMES DAILY
Qty: 6 TABLET | Refills: 0 | Status: SHIPPED | OUTPATIENT
Start: 2024-05-13 | End: 2024-05-16

## 2024-05-13 NOTE — PROGRESS NOTES
Heidygood Epstein female 73 year old         Chief Complaint   Patient presents with    Hypertension   Discussed blood pressure  -  medications ,lifestyle - readings .  Has no symptoms     BP Meds: lisinopril-hydroCHLOROthiazide Tabs - 10-12.5 MG        Had right ankle pain in [place] - used cbd cream was great -  was swollen - seems like gout     Has afib on xarelto  so no nsaids  - previous fx  has hardware        UTI  sxs - getting  abx  thru urology - had another in feb in [facility] - st caths self     To get stress test and  echo -  thru [facility]  -  no sx  - monitor  showed something - has pacemkaer     Last year had  bowel perforation - last  scope  22  - has prominent  stomach      No thyroid  sx       Ros  has some  MACE  -  not knew      Patient Active Problem List   Diagnosis    Essential hypertension    Hypothyroidism    Ureteral stricture, right    Atrioventricular block, complete (HCC)    Dyslipidemia    Permanent atrial fibrillation (HCC)    Sick sinus syndrome (HCC)    Spinal stenosis of lumbar region    Bladder outlet obstruction    Spondylolisthesis of lumbosacral region    Hand cramps    Status post placement of cardiac pacemaker    History of urinary self-catheterization    Migraine with aura and without status migrainosus, not intractable          Current Outpatient Medications on File Prior to Visit   Medication Sig Dispense Refill    LISINOPRIL-HYDROCHLOROTHIAZIDE 10-12.5 MG Oral Tab TAKE 1 TABLET BY MOUTH EVERY DAY 90 tablet 0    SUMAtriptan 50 MG Oral Tab Take 1 tablet (50 mg total) by mouth every 2 (two) hours as needed for Migraine. 9 tablet 2    estradiol (ESTRACE) 0.1 MG/GM Vaginal Cream Apply fingertip amount of cream to the vagina every night for 1 week and then every other night indefinitely 42.5 g 11    atorvastatin 20 MG Oral Tab Take 1 tablet (20 mg total) by mouth nightly. 90 tablet 3    levothyroxine 75 MCG Oral Tab TAKE 1 TABLET BY MOUTH EVERY DAY BEFORE BREAKFAST 90 tablet 3    magnesium  citrate 1.745 GM/30ML Oral Solution Take 74,000 mL by mouth once. Taking oral tabs twice daily      ascorbic acid, VITAMIN C, 250 MG Oral Tab Take 1 tablet (250 mg total) by mouth in the morning and 1 tablet (250 mg total) before bedtime.      Rivaroxaban 20 MG Oral Tab Take 1 tablet (20 mg total) by mouth every evening. 30 tablet 0    Echinacea-Goldenseal (ECHINACEA COMB/SAINI SEAL OR) Take by mouth daily.        Calcium Carb-Cholecalciferol (CALCIUM 1000 + D OR) Take 1 tablet by mouth 2 (two) times daily.      Vitamin D3 50 MCG (2000 UT) Oral Cap Take 1 capsule (2,000 Units total) by mouth nightly.      Multiple Vitamins-Minerals (MULTIVITAMIN OR) Take 1 tablet by mouth nightly.         No current facility-administered medications on file prior to visit.          Vitals:    05/13/24 0852   BP: 118/70   Pulse: 80   VITALSBody mass index is 32.81 kg/m².    Pertinent findings on the physical exam; looks good  cor reg  chest clear  bilat  ventral hernia ??      Ankle exam nl     Heidy was seen today for hypertension.    Diagnoses and all orders for this visit:    Ventral hernia without obstruction or gangrene  -     SURGERY - INTERNAL; Future    Right ankle swelling  -     Uric Acid; Future    Sick sinus syndrome (HCC)    Acquired hypothyroidism    Essential hypertension  -     Comp Metabolic Panel (14); Future    Stage 3a chronic kidney disease (HCC)  -     CBC With Differential With Platelet; Future  -     Comp Metabolic Panel (14); Future    Chronic anticoagulation  -     CBC With Differential With Platelet; Future         Discussed   possible hernia  refer to surgery     Await stress test and  echo        Check  labs for  possible  gout    No need for xray now  low suspicion for fx or hardware issues     Check tsh and  chem  and cbc             This note was prepared using Dragon Medical voice recognition dictation software and as a result, errors may occur. When identified, these errors have been corrected.  While every attempt is made to correct errors during dictation, discrepancies may still exist

## 2024-05-15 ENCOUNTER — OFFICE VISIT (OUTPATIENT)
Dept: SURGERY | Facility: CLINIC | Age: 74
End: 2024-05-15

## 2024-05-15 VITALS — BODY MASS INDEX: 32.95 KG/M2 | HEIGHT: 58 IN | WEIGHT: 157 LBS

## 2024-05-15 DIAGNOSIS — K43.9 VENTRAL HERNIA WITHOUT OBSTRUCTION OR GANGRENE: ICD-10-CM

## 2024-05-15 DIAGNOSIS — I49.5 SICK SINUS SYNDROME (HCC): ICD-10-CM

## 2024-05-15 DIAGNOSIS — Z79.01 ANTICOAGULATED: ICD-10-CM

## 2024-05-15 DIAGNOSIS — I44.2 AV BLOCK, COMPLETE (HCC): ICD-10-CM

## 2024-05-15 DIAGNOSIS — I48.91 ATRIAL FIBRILLATION, UNSPECIFIED TYPE (HCC): ICD-10-CM

## 2024-05-15 DIAGNOSIS — K43.2 INCISIONAL HERNIA, WITHOUT OBSTRUCTION OR GANGRENE: Primary | ICD-10-CM

## 2024-05-15 DIAGNOSIS — Z95.0 PACEMAKER: ICD-10-CM

## 2024-05-15 PROCEDURE — 99204 OFFICE O/P NEW MOD 45 MIN: CPT | Performed by: SURGERY

## 2024-05-15 NOTE — H&P
Chief complaint:   Chief Complaint   Patient presents with    Hernia     Referred by Dr. Eleazar Vaughan for ventral hernia.       HPI: Heidy presents for consult for an incisional hernia. She underwent in [place] an emergency colon resection, possibly Kelly's procedure and then had colostomy closure. She has a bulge in the midline incision. No N/V/SBO.  She has noticed an increase in the size of her incisional hernia and experiences asymmetric protrusion of the abdominal wall.    Past medical history:   Past Medical History:    A-fib (HCC)    managed    Abnormal cholesterol test    CHOLESTEROL 1998 PER. NG.    Arrhythmia    Atrial Fibrillation    Arthritis    Back problem    Cataract    Disorder of thyroid    H/O complete eye exam    H/O diagnostic mammography    H/O exercise stress test    High blood pressure    High cholesterol    HTN (hypertension)    Hyperlipidemia    Hyperlipidemia    Hypothyroidism    Migraines    Neurogenic bladder    Pacemaker    Pulmonary embolism (HCC)    On patients HX client communicates no PE?     Self-catheterizes urinary bladder    Visual impairment    glasses       Past surgical history:   Past Surgical History:   Procedure Laterality Date    Carpal tunnel release      Colonoscopy  2010    Colonoscopy N/A 8/13/2020    Procedure: COLONOSCOPY;  Surgeon: Gino Lawson MD;  Location: [facility] ENDOSCOPY    Other      Jaw surgery    Other Right     elbow surgery    Other Right     ankle surgery    Pacemaker         Allergies:   Allergies   Allergen Reactions    Levaquin [Levofloxacin] HIVES    Vancomycin HIVES and OTHER (SEE COMMENTS)       Medications:   Current Outpatient Medications   Medication Sig Dispense Refill    sulfamethoxazole-trimethoprim -160 MG Oral Tab per tablet Take 1 tablet by mouth 2 (two) times daily for 3 days. 6 tablet 0    LISINOPRIL-HYDROCHLOROTHIAZIDE 10-12.5 MG Oral Tab TAKE 1 TABLET BY MOUTH EVERY DAY 90 tablet 0    SUMAtriptan 50 MG Oral Tab Take 1 tablet (50 mg  total) by mouth every 2 (two) hours as needed for Migraine. 9 tablet 2    estradiol (ESTRACE) 0.1 MG/GM Vaginal Cream Apply fingertip amount of cream to the vagina every night for 1 week and then every other night indefinitely 42.5 g 11    atorvastatin 20 MG Oral Tab Take 1 tablet (20 mg total) by mouth nightly. 90 tablet 3    levothyroxine 75 MCG Oral Tab TAKE 1 TABLET BY MOUTH EVERY DAY BEFORE BREAKFAST 90 tablet 3    magnesium citrate 1.745 GM/30ML Oral Solution Take 74,000 mL by mouth once. Taking oral tabs twice daily      ascorbic acid, VITAMIN C, 250 MG Oral Tab Take 1 tablet (250 mg total) by mouth in the morning and 1 tablet (250 mg total) before bedtime.      Rivaroxaban 20 MG Oral Tab Take 1 tablet (20 mg total) by mouth every evening. 30 tablet 0    Echinacea-Goldenseal (ECHINACEA COMB/SAINI SEAL OR) Take by mouth daily.        Calcium Carb-Cholecalciferol (CALCIUM 1000 + D OR) Take 1 tablet by mouth 2 (two) times daily.      Vitamin D3 50 MCG (2000 UT) Oral Cap Take 1 capsule (2,000 Units total) by mouth nightly.      Multiple Vitamins-Minerals (MULTIVITAMIN OR) Take 1 tablet by mouth nightly.           Social history:   Social History     Socioeconomic History    Marital status:    Tobacco Use    Smoking status: Never    Smokeless tobacco: Never   Vaping Use    Vaping status: Never Used   Substance and Sexual Activity    Alcohol use: Not Currently     Comment: social    Drug use: No        Family history:  Family History   Problem Relation Age of Onset    Breast Cancer Other 28    Heart Disease Father     Lipids Father         HYPERLIPIDEMIA    Hypertension Father     Cancer Mother         STOMACH,COLON    Heart Disease Mother         Review of Systems:   GENERAL: feels generally well  SKIN: no ulcerated or worrisome skin lesions  EYES:denies blurred vision or double vision  HEENT: denies new nasal congestion, sinus pain or ST  LUNGS: denies shortness of breath with exertion  CARDIOVASCULAR:  denies chest pain on exertion  GI: no hematemesis, no BRBPR, no worsening heartburn  : no dysuria, no blood in urine, no difficulty urinating  MUSCULOSKELETAL: no new musculoskeletal complaints  NEURO: no persistent, recurrent  headaches  PSYCHE:no depression or anxiety  HEMATOLOGIC: no hx of blood dyscrasia  ENDOCRINE: no new endocrine problems  ALL/ASTHMA: no new hx of severe allergy or asthma  BACK: normal, no spinal deformity, no CVA tenderness    Physical examination:     Constitutional: appears well hydrated alert and responsive no acute distress noted  HEENT wnl, anicteric, PERRL, normocephalic, atraumatic  Neck supple, norm ROM, no JVD  L CTA B  H Reg rate  Abd soft, NT, ND, no masses, probable incisional hernia, no HSM.  Extr no c/c/e  Skin intact, no jaundice, no rashes, no lesions  Neuro grossly intact, no focal deficits, no tremors  Back no deformity, no CVA tnd.         Assessment and plan:  Diagnoses and all orders for this visit:    Incisional hernia, without obstruction or gangrene    Ventral hernia without obstruction or gangrene  -     SURGERY - INTERNAL  -     CT ABDOMEN+PELVIS(CONTRAST ONLY)(CPT=74177); Future    AV block, complete (HCC)    Atrial fibrillation, unspecified type (HCC)    Pacemaker    Sick sinus syndrome (HCC)    Anticoagulated       Plan CT for better eval of both midline and ostomy incisions.  Differential diagnosis, further workup, surgery discussed.   RTC.  Thank you.    Daija Martinez MD  5/15/2024  11:31 AM

## 2024-05-24 ENCOUNTER — PATIENT MESSAGE (OUTPATIENT)
Dept: SURGERY | Facility: CLINIC | Age: 74
End: 2024-05-24

## 2024-05-28 NOTE — TELEPHONE ENCOUNTER
From: Heidy Epstein  To: Daija Martinez  Sent: 5/24/2024 3:32 PM CDT  Subject: CT    Hi ,    Just got the results from my nuclear stress test. While reading I notice that it said:    abdomen: Middle hepatic lobe cyst    Just thought you might want to see those results. I have an appt for June 7th for a CT with contrast scheduled. I am scheduled for surgery on July 23.

## 2024-05-31 RX ORDER — ATORVASTATIN CALCIUM 20 MG/1
20 TABLET, FILM COATED ORAL NIGHTLY
Qty: 90 TABLET | Refills: 3 | Status: SHIPPED | OUTPATIENT
Start: 2024-05-31

## 2024-06-06 RX ORDER — LEVOTHYROXINE SODIUM 0.07 MG/1
TABLET ORAL
Qty: 90 TABLET | Refills: 3 | Status: SHIPPED | OUTPATIENT
Start: 2024-06-06

## 2024-06-06 RX ORDER — LISINOPRIL AND HYDROCHLOROTHIAZIDE 12.5; 1 MG/1; MG/1
1 TABLET ORAL DAILY
Qty: 90 TABLET | Refills: 0 | Status: SHIPPED | OUTPATIENT
Start: 2024-06-06

## 2024-06-07 ENCOUNTER — HOSPITAL ENCOUNTER (OUTPATIENT)
Dept: CT IMAGING | Facility: HOSPITAL | Age: 74
Discharge: HOME | End: 2024-06-07
Attending: SURGERY
Payer: MEDICARE

## 2024-06-07 DIAGNOSIS — K43.9 VENTRAL HERNIA WITHOUT OBSTRUCTION OR GANGRENE: ICD-10-CM

## 2024-06-07 PROCEDURE — 74177 CT ABD & PELVIS W/CONTRAST: CPT | Performed by: SURGERY

## 2024-06-10 ENCOUNTER — OFFICE VISIT (OUTPATIENT)
Dept: SURGERY | Facility: CLINIC | Age: 74
End: 2024-06-10
Payer: MEDICARE

## 2024-06-10 DIAGNOSIS — K43.2 INCISIONAL HERNIA, WITHOUT OBSTRUCTION OR GANGRENE: Primary | ICD-10-CM

## 2024-06-10 PROCEDURE — 99214 OFFICE O/P EST MOD 30 MIN: CPT | Performed by: SURGERY

## 2024-06-10 NOTE — H&P
Chief complaint:   Chief Complaint   Patient presents with    Hernia     Abdomen+Pelvis CT, 6/7. Fito like to review. Pain is on and off.        HPI: Heidy is a delightful patient, she has a pacemaker in place, she presents for follow up to consult for an incisional hernia. She underwent in [place] an emergency colon resection, possibly Kelly's procedure and then had colostomy closure. She has a bulge in the midline incision with extension to the right and one at the upper aspect of the incision. No N/V/SBO.  She has noticed an increase in the size of her incisional hernia and experiences asymmetric protrusion of the abdominal wall.    CT obtained  CONCLUSION:  1. Large ventral abdominal wall hernia containing multiple nonobstructed small bowel loops.  2. Small fat containing supraumbilical rupal-incisional abdominal wall hernia.       Past medical history:   Past Medical History:    A-fib (HCC)    managed    Abnormal cholesterol test    CHOLESTEROL 1998 PER. NG.    Arrhythmia    Atrial Fibrillation    Arthritis    Back problem    Cataract    Disorder of thyroid    H/O complete eye exam    H/O diagnostic mammography    H/O exercise stress test    High blood pressure    High cholesterol    HTN (hypertension)    Hyperlipidemia    Hyperlipidemia    Hypothyroidism    Migraines    Neurogenic bladder    Pacemaker    Pulmonary embolism (HCC)    On patients HX client communicates no PE?     Self-catheterizes urinary bladder    Visual impairment    glasses       Past surgical history:   Past Surgical History:   Procedure Laterality Date    Carpal tunnel release      Colonoscopy  2010    Colonoscopy N/A 8/13/2020    Procedure: COLONOSCOPY;  Surgeon: Gino Lawson MD;  Location: [facility] ENDOSCOPY    Other      Jaw surgery    Other Right     elbow surgery    Other Right     ankle surgery    Pacemaker         Allergies:   Allergies   Allergen Reactions    Levaquin [Levofloxacin] HIVES    Vancomycin HIVES and OTHER (SEE COMMENTS)        Medications:   Current Outpatient Medications   Medication Sig Dispense Refill    LEVOTHYROXINE 75 MCG Oral Tab TAKE 1 TABLET BY MOUTH EVERY DAY BEFORE BREAKFAST 90 tablet 3    LISINOPRIL-HYDROCHLOROTHIAZIDE 10-12.5 MG Oral Tab TAKE 1 TABLET BY MOUTH EVERY DAY 90 tablet 0    ATORVASTATIN 20 MG Oral Tab TAKE 1 TABLET BY MOUTH EVERY DAY AT NIGHT 90 tablet 3    SUMAtriptan 50 MG Oral Tab Take 1 tablet (50 mg total) by mouth every 2 (two) hours as needed for Migraine. 9 tablet 2    estradiol (ESTRACE) 0.1 MG/GM Vaginal Cream Apply fingertip amount of cream to the vagina every night for 1 week and then every other night indefinitely 42.5 g 11    magnesium citrate 1.745 GM/30ML Oral Solution Take 74,000 mL by mouth once. Taking oral tabs twice daily      ascorbic acid, VITAMIN C, 250 MG Oral Tab Take 1 tablet (250 mg total) by mouth in the morning and 1 tablet (250 mg total) before bedtime.      Rivaroxaban 20 MG Oral Tab Take 1 tablet (20 mg total) by mouth every evening. 30 tablet 0    Echinacea-Goldenseal (ECHINACEA COMB/SAINI SEAL OR) Take by mouth daily.        Calcium Carb-Cholecalciferol (CALCIUM 1000 + D OR) Take 1 tablet by mouth 2 (two) times daily.      Vitamin D3 50 MCG (2000 UT) Oral Cap Take 1 capsule (2,000 Units total) by mouth nightly.      Multiple Vitamins-Minerals (MULTIVITAMIN OR) Take 1 tablet by mouth nightly.           Social history:   Social History     Socioeconomic History    Marital status:    Tobacco Use    Smoking status: Never    Smokeless tobacco: Never   Vaping Use    Vaping status: Never Used   Substance and Sexual Activity    Alcohol use: Not Currently     Comment: social    Drug use: No        Family history:  Family History   Problem Relation Age of Onset    Breast Cancer Other 28    Heart Disease Father     Lipids Father         HYPERLIPIDEMIA    Hypertension Father     Cancer Mother         STOMACH,COLON    Heart Disease Mother         Review of Systems:   GENERAL:  feels generally well  SKIN: no ulcerated or worrisome skin lesions  EYES:denies blurred vision or double vision  HEENT: denies new nasal congestion, sinus pain or ST  LUNGS: denies shortness of breath with exertion  CARDIOVASCULAR: denies chest pain on exertion  GI: no hematemesis, no BRBPR, no worsening heartburn  : no dysuria, no blood in urine, no difficulty urinating  MUSCULOSKELETAL: no new musculoskeletal complaints  NEURO: no persistent, recurrent  headaches  PSYCHE:no depression or anxiety  HEMATOLOGIC: no hx of blood dyscrasia  ENDOCRINE: no new endocrine problems  ALL/ASTHMA: no new hx of severe allergy or asthma  BACK: normal, no spinal deformity, no CVA tenderness    Physical examination:     Constitutional: appears well hydrated alert and responsive no acute distress noted  HEENT wnl, anicteric, PERRL, normocephalic, atraumatic  Neck supple, norm ROM, no JVD  L CTA B  H Reg rate  Abd soft, NT, ND, no masses,  two incisional hernias, no HSM.  Extr no c/c/e  Skin intact, no jaundice, no rashes, no lesions  Neuro grossly intact, no focal deficits, no tremors  Back no deformity, no CVA tnd.         Assessment and plan:  Diagnoses and all orders for this visit:    Incisional hernia, without obstruction or gangrene       Plan repair with mesh of two hernias, incisional.  Differential diagnosis, cardiology clearance, surgery discussed.   RTC.  Thank you.    Daija Martinez MD

## 2024-07-10 RX ORDER — AMPICILLIN TRIHYDRATE 250 MG
800 CAPSULE ORAL DAILY
COMMUNITY

## 2024-07-10 RX ORDER — MULTIVITAMIN WITH IRON
250 TABLET ORAL 2 TIMES DAILY
COMMUNITY

## 2024-07-10 RX ORDER — ASCORBIC ACID 500 MG
1000 TABLET ORAL 2 TIMES DAILY
COMMUNITY

## 2024-07-10 RX ORDER — CALCIUM POLYCARBOPHIL 625 MG
1 TABLET ORAL NIGHTLY
COMMUNITY

## 2024-07-17 ENCOUNTER — LAB ENCOUNTER (OUTPATIENT)
Dept: LAB | Facility: HOSPITAL | Age: 74
End: 2024-07-17
Attending: UROLOGY
Payer: MEDICARE

## 2024-07-17 DIAGNOSIS — R33.9 URINARY RETENTION: ICD-10-CM

## 2024-07-17 DIAGNOSIS — N39.0 RECURRENT UTI: ICD-10-CM

## 2024-07-17 LAB
BILIRUB UR QL: NEGATIVE
GLUCOSE UR-MCNC: NORMAL MG/DL
HGB UR QL STRIP.AUTO: NEGATIVE
KETONES UR-MCNC: NEGATIVE MG/DL
LEUKOCYTE ESTERASE UR QL STRIP.AUTO: 500
PH UR: 5.5 [PH] (ref 5–8)
PROT UR-MCNC: NEGATIVE MG/DL
SP GR UR STRIP: 1.01 (ref 1–1.03)
UROBILINOGEN UR STRIP-ACNC: NORMAL
WBC #/AREA URNS AUTO: >50 /HPF
WBC CLUMPS UR QL AUTO: PRESENT /HPF

## 2024-07-17 PROCEDURE — 81001 URINALYSIS AUTO W/SCOPE: CPT

## 2024-07-17 PROCEDURE — 87186 SC STD MICRODIL/AGAR DIL: CPT

## 2024-07-17 PROCEDURE — 87086 URINE CULTURE/COLONY COUNT: CPT

## 2024-07-17 PROCEDURE — 87077 CULTURE AEROBIC IDENTIFY: CPT

## 2024-07-18 RX ORDER — SULFAMETHOXAZOLE AND TRIMETHOPRIM 800; 160 MG/1; MG/1
1 TABLET ORAL 2 TIMES DAILY
Qty: 6 TABLET | Refills: 0 | Status: SHIPPED | OUTPATIENT
Start: 2024-07-18 | End: 2024-07-21

## 2024-07-19 ENCOUNTER — TELEPHONE (OUTPATIENT)
Dept: SURGERY | Facility: CLINIC | Age: 74
End: 2024-07-19

## 2024-07-19 NOTE — TELEPHONE ENCOUNTER
I called pt and informed her of the results msg from AR as stated below and pt stated that she started taking the abx 2 days ago and she did have cloudy clumpy urine as she describes it. No other UTI symptoms.

## 2024-07-19 NOTE — TELEPHONE ENCOUNTER
----- Message from Nayeli Deleon sent at 7/18/2024  4:12 PM CDT -----  Hi all  Please let patient know that there are some preliminary bacteria in the bladder. I have written her for 3 days of bactrim empirically. As always, she should not take the antibiotics unless she is having symptoms. Otherwise, she should not take the antibiotics and Urine should not be checked.    Thanks,  AR

## 2024-07-22 ENCOUNTER — ANESTHESIA EVENT (OUTPATIENT)
Dept: SURGERY | Facility: HOSPITAL | Age: 74
End: 2024-07-22
Payer: MEDICARE

## 2024-07-23 ENCOUNTER — ANESTHESIA (OUTPATIENT)
Dept: SURGERY | Facility: HOSPITAL | Age: 74
End: 2024-07-23
Payer: MEDICARE

## 2024-07-23 ENCOUNTER — HOSPITAL ENCOUNTER (OUTPATIENT)
Facility: HOSPITAL | Age: 74
Setting detail: HOSPITAL OUTPATIENT SURGERY
Discharge: HOME | End: 2024-07-23
Attending: SURGERY | Admitting: SURGERY
Payer: MEDICARE

## 2024-07-23 VITALS
TEMPERATURE: 98 F | HEIGHT: 58 IN | BODY MASS INDEX: 32.12 KG/M2 | DIASTOLIC BLOOD PRESSURE: 60 MMHG | SYSTOLIC BLOOD PRESSURE: 143 MMHG | WEIGHT: 153 LBS | HEART RATE: 66 BPM | RESPIRATION RATE: 16 BRPM | OXYGEN SATURATION: 96 %

## 2024-07-23 DIAGNOSIS — I48.91 ATRIAL FIBRILLATION, UNSPECIFIED TYPE (HCC): ICD-10-CM

## 2024-07-23 DIAGNOSIS — K43.9 VENTRAL HERNIA WITHOUT OBSTRUCTION OR GANGRENE: ICD-10-CM

## 2024-07-23 DIAGNOSIS — I44.2 AV BLOCK, COMPLETE (HCC): ICD-10-CM

## 2024-07-23 DIAGNOSIS — I49.5 SICK SINUS SYNDROME (HCC): ICD-10-CM

## 2024-07-23 DIAGNOSIS — G89.18 POST-OP PAIN: Primary | ICD-10-CM

## 2024-07-23 DIAGNOSIS — Z79.01 ANTICOAGULATED: ICD-10-CM

## 2024-07-23 DIAGNOSIS — Z95.0 PACEMAKER: ICD-10-CM

## 2024-07-23 DIAGNOSIS — K43.2 INCISIONAL HERNIA, WITHOUT OBSTRUCTION OR GANGRENE: ICD-10-CM

## 2024-07-23 PROCEDURE — 0WUF0JZ SUPPLEMENT ABDOMINAL WALL WITH SYNTHETIC SUBSTITUTE, OPEN APPROACH: ICD-10-PCS | Performed by: SURGERY

## 2024-07-23 PROCEDURE — 88302 TISSUE EXAM BY PATHOLOGIST: CPT | Performed by: SURGERY

## 2024-07-23 PROCEDURE — 76942 ECHO GUIDE FOR BIOPSY: CPT | Performed by: STUDENT IN AN ORGANIZED HEALTH CARE EDUCATION/TRAINING PROGRAM

## 2024-07-23 PROCEDURE — 88305 TISSUE EXAM BY PATHOLOGIST: CPT | Performed by: SURGERY

## 2024-07-23 DEVICE — VENTRIO ST HERNIA PATCH
Type: IMPLANTABLE DEVICE | Status: FUNCTIONAL
Brand: VENTRIO ST HERNIA PATCH

## 2024-07-23 RX ORDER — ONDANSETRON 2 MG/ML
INJECTION INTRAMUSCULAR; INTRAVENOUS AS NEEDED
Status: DISCONTINUED | OUTPATIENT
Start: 2024-07-23 | End: 2024-07-23 | Stop reason: SURG

## 2024-07-23 RX ORDER — HYDROCODONE BITARTRATE AND ACETAMINOPHEN 5; 325 MG/1; MG/1
1 TABLET ORAL ONCE
Status: COMPLETED | OUTPATIENT
Start: 2024-07-23 | End: 2024-07-23

## 2024-07-23 RX ORDER — HYDROMORPHONE HYDROCHLORIDE 1 MG/ML
0.6 INJECTION, SOLUTION INTRAMUSCULAR; INTRAVENOUS; SUBCUTANEOUS EVERY 5 MIN PRN
Status: DISCONTINUED | OUTPATIENT
Start: 2024-07-23 | End: 2024-07-23

## 2024-07-23 RX ORDER — FAMOTIDINE 10 MG/ML
20 INJECTION, SOLUTION INTRAVENOUS ONCE
Status: COMPLETED | OUTPATIENT
Start: 2024-07-23 | End: 2024-07-23

## 2024-07-23 RX ORDER — HYDROCODONE BITARTRATE AND ACETAMINOPHEN 5; 325 MG/1; MG/1
1 TABLET ORAL EVERY 6 HOURS PRN
Qty: 30 TABLET | Refills: 0 | Status: SHIPPED | OUTPATIENT
Start: 2024-07-23

## 2024-07-23 RX ORDER — FAMOTIDINE 20 MG/1
20 TABLET, FILM COATED ORAL ONCE
Status: COMPLETED | OUTPATIENT
Start: 2024-07-23 | End: 2024-07-23

## 2024-07-23 RX ORDER — METOCLOPRAMIDE 10 MG/1
10 TABLET ORAL ONCE
Status: COMPLETED | OUTPATIENT
Start: 2024-07-23 | End: 2024-07-23

## 2024-07-23 RX ORDER — MIDAZOLAM HYDROCHLORIDE 1 MG/ML
INJECTION INTRAMUSCULAR; INTRAVENOUS AS NEEDED
Status: DISCONTINUED | OUTPATIENT
Start: 2024-07-23 | End: 2024-07-23 | Stop reason: SURG

## 2024-07-23 RX ORDER — KETOROLAC TROMETHAMINE 15 MG/ML
15 INJECTION, SOLUTION INTRAMUSCULAR; INTRAVENOUS ONCE
Status: COMPLETED | OUTPATIENT
Start: 2024-07-23 | End: 2024-07-23

## 2024-07-23 RX ORDER — BUPIVACAINE HYDROCHLORIDE 2.5 MG/ML
INJECTION, SOLUTION EPIDURAL; INFILTRATION; INTRACAUDAL AS NEEDED
Status: DISCONTINUED | OUTPATIENT
Start: 2024-07-23 | End: 2024-07-23 | Stop reason: SURG

## 2024-07-23 RX ORDER — NALOXONE HYDROCHLORIDE 0.4 MG/ML
0.08 INJECTION, SOLUTION INTRAMUSCULAR; INTRAVENOUS; SUBCUTANEOUS AS NEEDED
Status: DISCONTINUED | OUTPATIENT
Start: 2024-07-23 | End: 2024-07-23

## 2024-07-23 RX ORDER — LIDOCAINE HYDROCHLORIDE 10 MG/ML
INJECTION, SOLUTION EPIDURAL; INFILTRATION; INTRACAUDAL; PERINEURAL AS NEEDED
Status: DISCONTINUED | OUTPATIENT
Start: 2024-07-23 | End: 2024-07-23 | Stop reason: SURG

## 2024-07-23 RX ORDER — METOCLOPRAMIDE HYDROCHLORIDE 5 MG/ML
10 INJECTION INTRAMUSCULAR; INTRAVENOUS EVERY 8 HOURS PRN
Status: DISCONTINUED | OUTPATIENT
Start: 2024-07-23 | End: 2024-07-23

## 2024-07-23 RX ORDER — HYDROMORPHONE HYDROCHLORIDE 1 MG/ML
0.2 INJECTION, SOLUTION INTRAMUSCULAR; INTRAVENOUS; SUBCUTANEOUS EVERY 5 MIN PRN
Status: DISCONTINUED | OUTPATIENT
Start: 2024-07-23 | End: 2024-07-23

## 2024-07-23 RX ORDER — ROCURONIUM BROMIDE 10 MG/ML
INJECTION, SOLUTION INTRAVENOUS AS NEEDED
Status: DISCONTINUED | OUTPATIENT
Start: 2024-07-23 | End: 2024-07-23 | Stop reason: SURG

## 2024-07-23 RX ORDER — SODIUM CHLORIDE, SODIUM LACTATE, POTASSIUM CHLORIDE, CALCIUM CHLORIDE 600; 310; 30; 20 MG/100ML; MG/100ML; MG/100ML; MG/100ML
INJECTION, SOLUTION INTRAVENOUS CONTINUOUS
Status: DISCONTINUED | OUTPATIENT
Start: 2024-07-23 | End: 2024-07-23

## 2024-07-23 RX ORDER — IBUPROFEN 600 MG/1
600 TABLET ORAL EVERY 8 HOURS PRN
Qty: 30 TABLET | Refills: 0 | Status: SHIPPED | OUTPATIENT
Start: 2024-07-23

## 2024-07-23 RX ORDER — HYDROMORPHONE HYDROCHLORIDE 1 MG/ML
0.4 INJECTION, SOLUTION INTRAMUSCULAR; INTRAVENOUS; SUBCUTANEOUS EVERY 5 MIN PRN
Status: DISCONTINUED | OUTPATIENT
Start: 2024-07-23 | End: 2024-07-23

## 2024-07-23 RX ORDER — ONDANSETRON 2 MG/ML
4 INJECTION INTRAMUSCULAR; INTRAVENOUS EVERY 6 HOURS PRN
Status: DISCONTINUED | OUTPATIENT
Start: 2024-07-23 | End: 2024-07-23

## 2024-07-23 RX ORDER — ACETAMINOPHEN 500 MG
1000 TABLET ORAL ONCE
Status: COMPLETED | OUTPATIENT
Start: 2024-07-23 | End: 2024-07-23

## 2024-07-23 RX ORDER — HYDRALAZINE HYDROCHLORIDE 20 MG/ML
10 INJECTION INTRAMUSCULAR; INTRAVENOUS ONCE
Status: COMPLETED | OUTPATIENT
Start: 2024-07-23 | End: 2024-07-23

## 2024-07-23 RX ORDER — METOCLOPRAMIDE HYDROCHLORIDE 5 MG/ML
10 INJECTION INTRAMUSCULAR; INTRAVENOUS ONCE
Status: COMPLETED | OUTPATIENT
Start: 2024-07-23 | End: 2024-07-23

## 2024-07-23 RX ORDER — HYDROMORPHONE HYDROCHLORIDE 1 MG/ML
INJECTION, SOLUTION INTRAMUSCULAR; INTRAVENOUS; SUBCUTANEOUS AS NEEDED
Status: DISCONTINUED | OUTPATIENT
Start: 2024-07-23 | End: 2024-07-23 | Stop reason: SURG

## 2024-07-23 RX ORDER — DEXAMETHASONE SODIUM PHOSPHATE 4 MG/ML
VIAL (ML) INJECTION AS NEEDED
Status: DISCONTINUED | OUTPATIENT
Start: 2024-07-23 | End: 2024-07-23 | Stop reason: SURG

## 2024-07-23 RX ORDER — ONDANSETRON 2 MG/ML
4 INJECTION INTRAMUSCULAR; INTRAVENOUS ONCE
Status: COMPLETED | OUTPATIENT
Start: 2024-07-23 | End: 2024-07-23

## 2024-07-23 RX ADMIN — LIDOCAINE HYDROCHLORIDE 50 MG: 10 INJECTION, SOLUTION EPIDURAL; INFILTRATION; INTRACAUDAL; PERINEURAL at 10:18:00

## 2024-07-23 RX ADMIN — MIDAZOLAM HYDROCHLORIDE 1 MG: 1 INJECTION INTRAMUSCULAR; INTRAVENOUS at 10:12:00

## 2024-07-23 RX ADMIN — BUPIVACAINE HYDROCHLORIDE 20 ML: 2.5 INJECTION, SOLUTION EPIDURAL; INFILTRATION; INTRACAUDAL at 12:30:00

## 2024-07-23 RX ADMIN — ROCURONIUM BROMIDE 20 MG: 10 INJECTION, SOLUTION INTRAVENOUS at 11:15:00

## 2024-07-23 RX ADMIN — HYDROMORPHONE HYDROCHLORIDE 0.25 MG: 1 INJECTION, SOLUTION INTRAMUSCULAR; INTRAVENOUS; SUBCUTANEOUS at 10:42:00

## 2024-07-23 RX ADMIN — ONDANSETRON 4 MG: 2 INJECTION INTRAMUSCULAR; INTRAVENOUS at 12:13:00

## 2024-07-23 RX ADMIN — DEXAMETHASONE SODIUM PHOSPHATE 4 MG: 4 MG/ML VIAL (ML) INJECTION at 10:25:00

## 2024-07-23 RX ADMIN — HYDROMORPHONE HYDROCHLORIDE 0.25 MG: 1 INJECTION, SOLUTION INTRAMUSCULAR; INTRAVENOUS; SUBCUTANEOUS at 11:43:00

## 2024-07-23 RX ADMIN — ROCURONIUM BROMIDE 30 MG: 10 INJECTION, SOLUTION INTRAVENOUS at 10:19:00

## 2024-07-23 RX ADMIN — SODIUM CHLORIDE, SODIUM LACTATE, POTASSIUM CHLORIDE, CALCIUM CHLORIDE: 600; 310; 30; 20 INJECTION, SOLUTION INTRAVENOUS at 12:40:00

## 2024-07-23 RX ADMIN — BUPIVACAINE HYDROCHLORIDE 20 ML: 2.5 INJECTION, SOLUTION EPIDURAL; INFILTRATION; INTRACAUDAL at 12:27:00

## 2024-07-23 RX ADMIN — ROCURONIUM BROMIDE 10 MG: 10 INJECTION, SOLUTION INTRAVENOUS at 11:24:00

## 2024-07-23 RX ADMIN — ROCURONIUM BROMIDE 20 MG: 10 INJECTION, SOLUTION INTRAVENOUS at 10:48:00

## 2024-07-23 NOTE — ANESTHESIA POSTPROCEDURE EVALUATION
Patient: Heidy Epstein    Procedure Summary       Date: 07/23/24 Room / Location: [facility] MAIN OR  / [facility] MAIN OR    Anesthesia Start: 1011 Anesthesia Stop: 1241    Procedure: Henia, two incisional repair with mesh, extensive lysis of adhesions, repair of stoma hernia , excisions of keloids (Abdomen) Diagnosis:       Ventral hernia without obstruction or gangrene      Incisional hernia, without obstruction or gangrene      AV block, complete (HCC)      Atrial fibrillation, unspecified type (HCC)      Pacemaker      Sick sinus syndrome (HCC)      Anticoagulated      (Ventral hernia without obstruction or gangrene [K43.9]Incisional hernia, without obstruction or gangrene [K43.2]AV block, complete (HCC) [I44.2]Atrial fibrillation, unspecified type (HCC) [I48.91]Pacemaker [Z95.0]Sick sinus syndrome (HCC))    Surgeons: Daija Martinez MD Anesthesiologist: Barron Stauffer DO    Anesthesia Type: general ASA Status: 3            Anesthesia Type: general    Vitals Value Taken Time   /84 07/23/24 1240   Temp 97.3 07/23/24 1244   Pulse 64 07/23/24 1244   Resp 20 07/23/24 1244   SpO2 92 % 07/23/24 1244   Vitals shown include unfiled device data.    [facility] AN Post Evaluation:   Patient Evaluated in PACU  Level of Consciousness: sleepy but conscious  Pain Management: adequate  Airway Patency:patent  Dental exam unchanged from preop  Yes    Nausea/Vomiting: none  Cardiovascular Status: acceptable  Respiratory Status: acceptable  Postoperative Hydration acceptable      Barron Stauffer DO  7/23/2024 12:44 PM

## 2024-07-23 NOTE — ANESTHESIA PREPROCEDURE EVALUATION
Anesthesia PreOp Note    HPI:     Heidy Epstein is a 74 year old female who presents for preoperative consultation requested by: Daija Martinez MD    Date of Surgery: 7/23/2024    Procedure(s):  Henia, incisional repair with mesh  Indication: Ventral hernia without obstruction or gangrene [K43.9]  Incisional hernia, without obstruction or gangrene [K43.2]  AV block, complete (HCC) [I44.2]  Atrial fibrillation, unspecified type (HCC) [I48.91]  Pacemaker [Z95.0]  Sick sinus syndrome (HCC) [I49.5]  Anticoagulated [Z79.01]    Relevant Problems   No relevant active problems       NPO:  Last Liquid Consumption Date: 07/22/24  Last Liquid Consumption Time: 2100  Last Solid Consumption Date: 07/22/24  Last Solid Consumption Time: 2000  Last Liquid Consumption Date: 07/22/24          History Review:  Patient Active Problem List    Diagnosis Date Noted    Migraine with aura and without status migrainosus, not intractable 11/01/2023    Status post placement of cardiac pacemaker 01/04/2023    History of urinary self-catheterization 01/04/2023    Hand cramps 10/04/2022    Bladder outlet obstruction 10/03/2022    Spondylolisthesis of lumbosacral region 10/03/2022    Ureteral stricture, right     Dyslipidemia 12/19/2019    Sick sinus syndrome (HCC) 12/19/2019    Essential hypertension 01/10/2014    Permanent atrial fibrillation (HCC) 08/10/2011    Hypothyroidism 01/05/2011    Spinal stenosis of lumbar region 11/30/2009    Atrioventricular block, complete (HCC) 06/28/2007       Past Medical History:    A-fib (HCC)    managed    Abnormal cholesterol test    CHOLESTEROL 1998 PER. NG.    Arrhythmia    Atrial Fibrillation    Arthritis    Back problem    Cataract    Disorder of thyroid    H/O complete eye exam    H/O diagnostic mammography    H/O exercise stress test    High blood pressure    High cholesterol    HTN (hypertension)    Hyperlipidemia    Hyperlipidemia    Hypothyroidism    Migraines    Neurogenic bladder     Pacemaker    Pulmonary embolism (HCC)    On patients HX client communicates no PE?     Self-catheterizes urinary bladder    Visual impairment    glasses       Past Surgical History:   Procedure Laterality Date    Carpal tunnel release Right     Colonoscopy  2010    Colonoscopy N/A 08/13/2020    Procedure: COLONOSCOPY;  Surgeon: Gino Lawson MD;  Location: [facility] ENDOSCOPY    Other      Jaw surgery    Other Right     elbow surgery    Other Right     ankle surgery    Pacemaker      Tonsillectomy         Medications Prior to Admission   Medication Sig Dispense Refill Last Dose    magnesium 250 MG Oral Tab Take 1 tablet (250 mg total) by mouth in the morning and 1 tablet (250 mg total) before bedtime.   Past Week    Vitamin C 500 MG Oral Tab Take 2 tablets (1,000 mg total) by mouth in the morning and 2 tablets (1,000 mg total) before bedtime.   Past Week    Calcium-Magnesium-Vitamin D (CALCIUM 500 OR) Take 1,000 mg by mouth daily.   Past Week    cholecalciferol 125 MCG (5000 UT) Oral Tab Take 1 tablet (5,000 Units total) by mouth daily.   Past Week    Garlic 400 MG Oral Tab EC Take 1 tablet by mouth daily.   Past Week    Cinnamon 500 MG Oral Cap Take 800 mg by mouth daily.   Past Week    Probiotic Product (FORTIFY PROBIOTIC WOMENS OR) Take 1 tablet by mouth daily.   Past Week    Calcium Polycarbophil (FIBER) 625 MG Oral Tab Take 1 tablet (625 mg total) by mouth at bedtime.   Past Week    Cranberry 360 MG Oral Cap Take 1 capsule by mouth daily.   Past Week    LEVOTHYROXINE 75 MCG Oral Tab TAKE 1 TABLET BY MOUTH EVERY DAY BEFORE BREAKFAST 90 tablet 3 7/22/2024    LISINOPRIL-HYDROCHLOROTHIAZIDE 10-12.5 MG Oral Tab TAKE 1 TABLET BY MOUTH EVERY DAY (Patient taking differently: Take 1 tablet by mouth every morning.) 90 tablet 0 7/22/2024    ATORVASTATIN 20 MG Oral Tab TAKE 1 TABLET BY MOUTH EVERY DAY AT NIGHT 90 tablet 3 7/22/2024    estradiol (ESTRACE) 0.1 MG/GM Vaginal Cream Apply fingertip amount of cream to the vagina every  night for 1 week and then every other night indefinitely 42.5 g 11 Past Week    Rivaroxaban 20 MG Oral Tab Take 1 tablet (20 mg total) by mouth every evening. 30 tablet 0 2024 at 1900    Echinacea-Goldenseal (ECHINACEA COMB/SAINI SEAL OR) Take by mouth daily.     Past Week    Multiple Vitamins-Minerals (MULTIVITAMIN OR) Take 1 tablet by mouth nightly.     Past Week    [] sulfamethoxazole-trimethoprim -160 MG Oral Tab per tablet Take 1 tablet by mouth 2 (two) times daily for 3 days. 6 tablet 0     SUMAtriptan 50 MG Oral Tab Take 1 tablet (50 mg total) by mouth every 2 (two) hours as needed for Migraine. 9 tablet 2 Unknown     Current Facility-Administered Medications Ordered in Epic   Medication Dose Route Frequency Provider Last Rate Last Admin    lactated ringers infusion   Intravenous Continuous Daija Martinez MD        ceFAZolin (Ancef) 2g in 10mL IV syringe premix  2 g Intravenous Once Daija Martinez MD         Current Outpatient Medications Ordered in Epic   Medication Sig Dispense Refill    HYDROcodone-acetaminophen 5-325 MG Oral Tab Take 1 tablet by mouth every 6 (six) hours as needed. 30 tablet 0    ibuprofen 600 MG Oral Tab Take 1 tablet (600 mg total) by mouth every 8 (eight) hours as needed. 30 tablet 0       Allergies   Allergen Reactions    Levaquin [Levofloxacin] HIVES    Vancomycin HIVES and OTHER (SEE COMMENTS)       Family History   Problem Relation Age of Onset    Heart Disease Father     Lipids Father         HYPERLIPIDEMIA    Hypertension Father     Cancer Mother         STOMACH,COLON    Heart Disease Mother     Breast Cancer Other 28     Social History     Socioeconomic History    Marital status:    Tobacco Use    Smoking status: Never    Smokeless tobacco: Never   Vaping Use    Vaping status: Never Used   Substance and Sexual Activity    Alcohol use: Not Currently     Comment: social    Drug use: Never   Other Topics Concern    Caffeine Concern No     Comment:  SODA 8OZ/DAY       Available pre-op labs reviewed.  Lab Results   Component Value Date    WBC 5.7 05/13/2024    RBC 5.31 (H) 05/13/2024    HGB 14.7 05/13/2024    HCT 44.3 05/13/2024    MCV 83.4 05/13/2024    MCH 27.7 05/13/2024    MCHC 33.2 05/13/2024    RDW 13.7 05/13/2024    .0 05/13/2024     Lab Results   Component Value Date     05/13/2024    K 3.8 05/13/2024     05/13/2024    CO2 28.0 05/13/2024    BUN 11 05/13/2024    CREATSERUM 0.94 05/13/2024    GLU 97 05/13/2024    CA 10.0 05/13/2024   Cardiac clearance given by Dr. Remy Lorenzo, who recommends bipolar, and use of magnet in light of pacemaker. Pt reports last taking xarelto 2 days ago.        Vital Signs:  Body mass index is 31.98 kg/m².   height is 1.473 m (4' 10\") and weight is 69.4 kg (153 lb). Her oral temperature is 98.1 °F (36.7 °C). Her blood pressure is 127/76 and her pulse is 72. Her respiration is 16 and oxygen saturation is 97%.   Vitals:    07/10/24 1448 07/23/24 0938   BP:  127/76   Pulse:  72   Resp:  16   Temp:  98.1 °F (36.7 °C)   TempSrc:  Oral   SpO2:  97%   Weight: 68.9 kg (152 lb) 69.4 kg (153 lb)   Height: 1.473 m (4' 10\")         Anesthesia Evaluation      Airway   Mallampati: II  TM distance: >3 FB  Neck ROM: full  Dental      Pulmonary - normal exam   Cardiovascular     PE comment: PM dependent    Neuro/Psych      GI/Hepatic/Renal      Endo/Other    Abdominal   (-) obese                 Anesthesia Plan:   ASA:  3  Plan:   General  Airway:  ETT  Implantable Device Management: Magnet  Post-op Pain Management: IV analgesics      I have informed Heidy Epstein and/or legal guardian or family member of the nature of the anesthetic plan, benefits, risks including possible dental damage if relevant, major complications, and any alternative forms of anesthetic management.   All of the patient's questions were answered to the best of my ability. The patient desires the anesthetic management as planned.  Barron Stauffer,    7/23/2024 10:00 AM  Present on Admission:  **None**

## 2024-07-23 NOTE — ANESTHESIA PROCEDURE NOTES
Peripheral Block    Performed by: Barron Stauffer DO  Authorized by: Barron Stauffer DO      General Information and Staff    Start Time:   Anesthesiologist:  Barron Stauffer DO  Patient Location:  OR    Block Placement: Post Induction  Site Identification: real time ultrasound guided      Reason for Block: at surgeon's request      Procedure Details    Patient Position:  Supine  Prep: ChloraPrep    Monitoring:  Heart rate, continuous pulse ox and blood pressure cuff  Block Type:  TAP  Laterality:  Bilateral  Injection Technique:  Single-shot    Needle    Needle Type:  Echogenic  Needle Gauge:  20 G  Needle Length:  90 mm  Needle Localization:  Ultrasound guidance  Reason for Ultrasound Use: appropriate spread of the medication was noted in real time            Assessment    Injection Assessment:  Good spread noted, low pressure, negative aspiration for heme and incremental injection  - Patient tolerated block procedure well without evidence of immediate block related complications.     Medications      Additional Comments

## 2024-07-23 NOTE — H&P
Chief complaint: Ventral Hernia      HPI: Heidy presents for consult for an incisional hernia. She underwent in [place] an emergency colon resection, possibly Kelly's procedure and then had colostomy closure. She has a bulge in the midline incision. No N/V/SBO.  She has noticed an increase in the size of her incisional hernia and experiences asymmetric protrusion of the abdominal wall.    Past medical history:   Past Medical History:    A-fib (HCC)    managed    Abnormal cholesterol test    CHOLESTEROL 1998 PER. NG.    Arrhythmia    Atrial Fibrillation    Arthritis    Back problem    Cataract    Disorder of thyroid    H/O complete eye exam    H/O diagnostic mammography    H/O exercise stress test    High blood pressure    High cholesterol    HTN (hypertension)    Hyperlipidemia    Hyperlipidemia    Hypothyroidism    Migraines    Neurogenic bladder    Pacemaker    Pulmonary embolism (HCC)    On patients HX client communicates no PE?     Self-catheterizes urinary bladder    Visual impairment    glasses       Past surgical history:   Past Surgical History:   Procedure Laterality Date    Carpal tunnel release Right     Colonoscopy  2010    Colonoscopy N/A 08/13/2020    Procedure: COLONOSCOPY;  Surgeon: Gino Lawson MD;  Location: [facility] ENDOSCOPY    Other      Jaw surgery    Other Right     elbow surgery    Other Right     ankle surgery    Pacemaker      Tonsillectomy         Allergies:   Allergies   Allergen Reactions    Levaquin [Levofloxacin] HIVES    Vancomycin HIVES and OTHER (SEE COMMENTS)       Medications:   Current Outpatient Medications   Medication Sig Dispense Refill    magnesium 250 MG Oral Tab Take 1 tablet (250 mg total) by mouth in the morning and 1 tablet (250 mg total) before bedtime.      Vitamin C 500 MG Oral Tab Take 2 tablets (1,000 mg total) by mouth in the morning and 2 tablets (1,000 mg total) before bedtime.      Calcium-Magnesium-Vitamin D (CALCIUM 500 OR) Take 1,000 mg by mouth daily.       cholecalciferol 125 MCG (5000 UT) Oral Tab Take 1 tablet (5,000 Units total) by mouth daily.      Garlic 400 MG Oral Tab EC Take 1 tablet by mouth daily.      Cinnamon 500 MG Oral Cap Take 800 mg by mouth daily.      Probiotic Product (FORTIFY PROBIOTIC WOMENS OR) Take 1 tablet by mouth daily.      Calcium Polycarbophil (FIBER) 625 MG Oral Tab Take 1 tablet (625 mg total) by mouth at bedtime.      Cranberry 360 MG Oral Cap Take 1 capsule by mouth daily.      LEVOTHYROXINE 75 MCG Oral Tab TAKE 1 TABLET BY MOUTH EVERY DAY BEFORE BREAKFAST 90 tablet 3    LISINOPRIL-HYDROCHLOROTHIAZIDE 10-12.5 MG Oral Tab TAKE 1 TABLET BY MOUTH EVERY DAY (Patient taking differently: Take 1 tablet by mouth every morning.) 90 tablet 0    ATORVASTATIN 20 MG Oral Tab TAKE 1 TABLET BY MOUTH EVERY DAY AT NIGHT 90 tablet 3    SUMAtriptan 50 MG Oral Tab Take 1 tablet (50 mg total) by mouth every 2 (two) hours as needed for Migraine. 9 tablet 2    estradiol (ESTRACE) 0.1 MG/GM Vaginal Cream Apply fingertip amount of cream to the vagina every night for 1 week and then every other night indefinitely 42.5 g 11    Rivaroxaban 20 MG Oral Tab Take 1 tablet (20 mg total) by mouth every evening. 30 tablet 0    Echinacea-Goldenseal (ECHINACEA COMB/SAINI SEAL OR) Take by mouth daily.        Multiple Vitamins-Minerals (MULTIVITAMIN OR) Take 1 tablet by mouth nightly.           Social history:   Social History     Socioeconomic History    Marital status:    Tobacco Use    Smoking status: Never    Smokeless tobacco: Never   Vaping Use    Vaping status: Never Used   Substance and Sexual Activity    Alcohol use: Not Currently     Comment: social    Drug use: Never        Family history:  Family History   Problem Relation Age of Onset    Heart Disease Father     Lipids Father         HYPERLIPIDEMIA    Hypertension Father     Cancer Mother         STOMACH,COLON    Heart Disease Mother     Breast Cancer Other 28        Review of Systems:   GENERAL: feels  generally well  SKIN: no ulcerated or worrisome skin lesions  EYES:denies blurred vision or double vision  HEENT: denies new nasal congestion, sinus pain or ST  LUNGS: denies shortness of breath with exertion  CARDIOVASCULAR: denies chest pain on exertion  GI: no hematemesis, no BRBPR, no worsening heartburn  : no dysuria, no blood in urine, no difficulty urinating  MUSCULOSKELETAL: no new musculoskeletal complaints  NEURO: no persistent, recurrent  headaches  PSYCHE:no depression or anxiety  HEMATOLOGIC: no hx of blood dyscrasia  ENDOCRINE: no new endocrine problems  ALL/ASTHMA: no new hx of severe allergy or asthma  BACK: normal, no spinal deformity, no CVA tenderness    Physical examination:     Constitutional: appears well hydrated alert and responsive no acute distress noted  HEENT wnl, anicteric, PERRL, normocephalic, atraumatic  Neck supple, norm ROM, no JVD  L CTA B  H Reg rate  Abd soft, NT, ND, no masses,  incarcerated incisional hernias, no HSM.  Extr no c/c/e  Skin intact, no jaundice, no rashes, no lesions  Neuro grossly intact, no focal deficits, no tremors  Back no deformity, no CVA tnd.         Assessment and plan:  Two incisional hernias, incarcerated, complex     Ventral hernia repair with mesh of two complicated incisional hernias.    We have discussed the surgical risks, benefits, alternatives, and expected recovery. All of the patient's questions have been answered to her satisfaction.  Thank you.    Daija Martinez MD

## 2024-07-23 NOTE — OPERATIVE REPORT
Pre-Operative Diagnosis: Ventral hernia without obstruction or gangrene [K43.9]  Incisional hernia, without obstruction or gangrene [K43.2]  AV block, complete (HCC) [I44.2]  Atrial fibrillation, unspecified type (HCC) [I48.91]  Pacemaker [Z95.0]  Sick sinus syndrome (HCC) [I49.5]  Anticoagulated [Z79.01]     Post-Operative Diagnosis: Two Incarcerated complex ventral incisional hernias  and incarcerated left lateral incisional hernia at previous stoma site,  Extensive adhesions, Painful upper abdominal and suprapubic Keloids of previous incisions     Procedure Performed: Repair with mesh Two Incarcerated complex ventral incisional hernias totaling 28 cm x 6 cm  and Repair with mesh 4 cm x 3 cm incarcerated left lateral incisional hernia at previous stoma site,  Extensive lysis of adhesions, Excision of 2  Painful upper abdominal and suprapubic Keloids and previous midline incision       Surgeons and Role:     * Daija Martinez MD - Primary    Assistant(s):  Surgical Assistant.: Sharon Roman     Surgical Findings:  Two Incarcerated complex ventral incisional hernias  and incarcerated left lateral incisional hernia at previous stoma site,  Extensive adhesions, Painful upper abdominal and suprapubic Keloids of previous incisions     Specimen: Hernia sacs and keloids     Estimated Blood Loss: 20 mL       INDICATION:  Pt is a 74 year old female who with a symptomatic Ventral hernia without obstruction or gangrene [K43.9]  Incisional hernia, without obstruction or gangrene [K43.2]  AV block, complete (HCC) [I44.2]  Atrial fibrillation, unspecified type (HCC) [I48.91]  Pacemaker [Z95.0]  Sick sinus syndrome (HCC) [I49.5]  Anticoagulated [Z79.01] who is scheduled for a Henia, two incisional repair with mesh, extensive lysis of adhesions, repair of stoma hernia , excisions of keloids.    CONSENT:  An informed consent discussion was held with the patient regarding the nature of ventral/incisional hernias, the  treatment options, expected outcomes, risks, benefits, complications, and expected recovery and restrictions.  These risks include but are not limited to bleeding, wound infection, mesh infection, bowel injury, chronic abdominal pain and hernia recurrence.  The patient expressed understanding and agreed to proceed with an open ventral/incisional hernia repair with mesh.      TECHNIQUE:    The patient was taken to the operating room, placed in supine position, SCDs were applied and were functioning, the pt was placed under general endotracheal anesthesia.  IV antibiotic was administered within 1 hour of incision time and SCDs were placed for DVT prophylaxis.  The abdomen was prepped and draped in sterile fashion.  An Ioban was used to cover the skin. The skin and subcutaneous tissue was infiltrated with 0.5% marcaine.  An incision was made over the hernia and the subcutaneous tissue was divided using electrocautery. The patient had painful 4 cm keloids at the upper and lower ends of her previous incisions. The incisions and keloids were excised. The hernia contents were chronically incarcerated. There were many adhesions which needed enterolysis. Extensive adhesiolysis taking over 30 minutes was performed.  The contents were identified and freed.  The hernia sacs were   excised and the fascia edges debrided.  The fascial defects  of two Incarcerated complex ventral incisional hernias totaling 28 cm x 6 cm  and  a left lateral abdominal incisional hernia of  4 cm x 3 cm  at previous stoma site,  The decision was made at this time to fix the hernia using a large Ventralex mesh as an underlay. 0 Ethibond was used to close the stoma hernia. The mesh was positioned. Eight  transfacial sutures of 0 prolene were used to secure the mesh.  The SecureStrap tacker was used to fill the gap between the transfacial sutures.  The mesh laid flat as an underlay and the  fascia was closed using 0 Ethibond to completely cover the mesh.   The wound was irrigated with saline and found to be hemostatic.  The subcutaneous tissue and skin were closed using 3-0 and 4-0 vicryl.  Sterile dressing was applied and the patient was awaken from anesthesia.  All instruments and sponge counts were correct and I was present for the critical portions of the procedure.        Daija Martinez M.D.

## 2024-07-23 NOTE — ANESTHESIA PROCEDURE NOTES
Airway  Date/Time: 7/23/2024 10:21 AM  Urgency: elective    Airway not difficult    General Information and Staff    Patient location during procedure: OR  Anesthesiologist: Barron Stauffer DO  Performed: anesthesiologist   Performed by: Barron Stauffer DO  Authorized by: Barron Stauffer DO      Indications and Patient Condition  Indications for airway management: anesthesia  Sedation level: deep  Preoxygenated: yes  Patient position: sniffing  Mask difficulty assessment: 1 - vent by mask  Planned trial extubation    Final Airway Details  Final airway type: endotracheal airway      Successful airway: ETT  Cuffed: yes   Successful intubation technique: direct laryngoscopy  Endotracheal tube insertion site: oral  Blade: Shon  ETT size (mm): 7.0    Cormack-Lehane Classification: grade I - full view of glottis  Placement verified by: capnometry   Measured from: lips  ETT to lips (cm): 22  Number of attempts at approach: 1  Ventilation between attempts: none  Number of other approaches attempted: 0

## 2024-07-23 NOTE — INTERVAL H&P NOTE
Pre-op Diagnosis: Ventral hernia without obstruction or gangrene [K43.9]  Incisional hernia, without obstruction or gangrene [K43.2]  AV block, complete (HCC) [I44.2]  Atrial fibrillation, unspecified type (HCC) [I48.91]  Pacemaker [Z95.0]  Sick sinus syndrome (HCC) [I49.5]  Anticoagulated [Z79.01]    The above referenced H&P was reviewed by Daija Martinez MD on 7/23/2024, the patient was examined and no significant changes have occurred in the patient's condition since the H&P was performed.  I discussed with the patient and/or legal representative the potential benefits, risks and side effects of this procedure; the likelihood of the patient achieving goals; and potential problems that might occur during recuperation.  I discussed reasonable alternatives to the procedure, including risks, benefits and side effects related to the alternatives and risks related to not receiving this procedure.  We will proceed with procedure as planned.

## 2024-07-23 NOTE — BRIEF OP NOTE
Pre-Operative Diagnosis: Ventral hernia without obstruction or gangrene [K43.9]  Incisional hernia, without obstruction or gangrene [K43.2]  AV block, complete (HCC) [I44.2]  Atrial fibrillation, unspecified type (HCC) [I48.91]  Pacemaker [Z95.0]  Sick sinus syndrome (HCC) [I49.5]  Anticoagulated [Z79.01]     Post-Operative Diagnosis: Two Incarcerated complex ventral incisional hernias  and incarcerated left lateral incisional hernia at previous stoma site,  Extensive adhesions, Painful upper abdominal and suprapubic Keloids of previous incisions     Procedure Performed: Repair with mesh Two Incarcerated complex ventral incisional hernias totaling 28 cm x 6 cm  and Repair with mesh 4 cm x 3 cm incarcerated left lateral incisional hernia at previous stoma site,  Extensive lysis of adhesions, Excision of 2  Painful upper abdominal and suprapubic Keloids and previous midline incision       Surgeons and Role:     * Daija Martinez MD - Primary    Assistant(s):  Surgical Assistant.: Sharon Roman     Surgical Findings:  Two Incarcerated complex ventral incisional hernias  and incarcerated left lateral incisional hernia at previous stoma site,  Extensive adhesions, Painful upper abdominal and suprapubic Keloids of previous incisions     Specimen: Hernia sacs and keloids     Estimated Blood Loss: 20 mL         Daija Martinez MD  7/23/2024  12:33 PM

## 2024-07-23 NOTE — DISCHARGE INSTRUCTIONS
DISCHARGE INSTRUCTIONS      Keep steri strips on, change gauze if saturated. If gauze remains dry and comfortable, may leave it on for a few days.   Diet, clear liquids then advance to a high fiber diet as tolerated .  Drink plenty of fluids.  No lifting > 10 lbs. Walking, stairs, normal activities as tolerated.   No driving.  See Geo WALKER  in 1-2 wk,  May use ice packs liberally.   Shower starting tomorrow, no submersion in bath/hot tub/pool.  Norco or Tylenol (do not exceed more than 4,000 mg of acetaminophen in 24 hours) can be taken along with Ibuprofen.  Stool softeners and laxatives as needed.   Wear abdominal binder as much as possible, especially when ambulating.       HOME INSTRUCTIONS  AMBSURG HOME CARE INSTRUCTIONS: POST-OP ANESTHESIA  The medication that you received for sedation or general anesthesia can last up to 24 hours. Your judgment and reflexes may be altered, even if you feel like your normal self.      We Recommend:   Do not drive any motor vehicle or bicycle   Avoid mowing the lawn, playing sports, or working with power tools/applicances (power saws, electric knives or mixers)   That you have someone stay with you on your first night home   Do not drink alcohol or take sleeping pills or tranquilizers   Do not sign legal documents within 24 hours of your procedure   If you had a nerve block for your surgery, take extra care not to put any pressure on your arm or hand for 24 hours    It is normal:  For you to have a sore throat if you had a breathing tube during surgery (while you were asleep!). The sore throat should get better within 48 hours. You can gargle with warm salt water (1/2 tsp in 4 oz warm water) or use a throat lozenge for comfort  To feel muscle aches or soreness especially in the abdomen, chest or neck. The achy feeling should go away in the next 24 hours  To feel weak, sleepy or \"wiped out\". Your should start feeling better in the next 24 hours.   To experience mild  discomforts such as sore lip or tongue, headache, cramps, gas pains or a bloated feeling in your abdomen.   To experience mild back pain or soreness for a day or two if you had spinal or epidural anesthesia.   If you had laparoscopic surgery, to feel shoulder pain or discomfort on the day of surgery.   For some patients to have nausea after surgery/anesthesia    If you feel nausea or experience vomiting:   Try to move around less.   Eat less than usual or drink only liquids until the next morning   Nausea should resolve in about 24 hours    If you have a problem when you are at home:    Call your surgeons office

## 2024-07-24 ENCOUNTER — TELEPHONE (OUTPATIENT)
Dept: SURGERY | Facility: CLINIC | Age: 74
End: 2024-07-24

## 2024-07-24 NOTE — TELEPHONE ENCOUNTER
Called patient back.She has very little appetite. Denies nausea/vomiting and constipation. She is going back on Xarelto tonight, and her cardiologist doesn't want her to take ibuprofen on empty stomach with xarelto. Advised clear liquids/soup, advancing to soft foods as tolerated. Eat small , more frequent portions, and stay well hydrated. May discontinue Norco ASAP, and switch to tylenol ES, not to exceed 4000 mg daily. Confirmed patient's PO apt. Patient verbalized understanding.

## 2024-07-24 NOTE — TELEPHONE ENCOUNTER
Patient states she has not been able to eat or drink very much since her surgery. Patient states her cardiologist advised her to contact Dr. Martinez because patient has been taking the 600mg of ibuprofen without eating and patient needs to start xarelto tonight and the cardiologist is worried without eating it will cause a bleed. Patient wants a call back with recommendations on what to do. Please call.

## 2024-08-06 ENCOUNTER — OFFICE VISIT (OUTPATIENT)
Dept: INTERNAL MEDICINE CLINIC | Facility: CLINIC | Age: 74
End: 2024-08-06
Payer: MEDICARE

## 2024-08-06 ENCOUNTER — TELEPHONE (OUTPATIENT)
Dept: SURGERY | Facility: CLINIC | Age: 74
End: 2024-08-06

## 2024-08-06 ENCOUNTER — HOSPITAL ENCOUNTER (EMERGENCY)
Facility: HOSPITAL | Age: 74
Discharge: HOME | End: 2024-08-06
Attending: EMERGENCY MEDICINE
Payer: MEDICARE

## 2024-08-06 ENCOUNTER — NURSE TRIAGE (OUTPATIENT)
Dept: INTERNAL MEDICINE CLINIC | Facility: CLINIC | Age: 74
End: 2024-08-06

## 2024-08-06 ENCOUNTER — APPOINTMENT (OUTPATIENT)
Dept: ULTRASOUND IMAGING | Facility: HOSPITAL | Age: 74
End: 2024-08-06
Attending: EMERGENCY MEDICINE
Payer: MEDICARE

## 2024-08-06 ENCOUNTER — LAB ENCOUNTER (OUTPATIENT)
Dept: LAB | Facility: HOSPITAL | Age: 74
End: 2024-08-06
Attending: INTERNAL MEDICINE
Payer: MEDICARE

## 2024-08-06 VITALS
WEIGHT: 153 LBS | RESPIRATION RATE: 28 BRPM | HEART RATE: 60 BPM | HEIGHT: 59 IN | OXYGEN SATURATION: 96 % | SYSTOLIC BLOOD PRESSURE: 126 MMHG | BODY MASS INDEX: 30.84 KG/M2 | DIASTOLIC BLOOD PRESSURE: 62 MMHG | TEMPERATURE: 99 F

## 2024-08-06 VITALS
DIASTOLIC BLOOD PRESSURE: 60 MMHG | WEIGHT: 152 LBS | BODY MASS INDEX: 31.91 KG/M2 | SYSTOLIC BLOOD PRESSURE: 132 MMHG | HEART RATE: 61 BPM | OXYGEN SATURATION: 95 % | HEIGHT: 58 IN

## 2024-08-06 DIAGNOSIS — R60.0 BILATERAL LEG EDEMA: Primary | ICD-10-CM

## 2024-08-06 DIAGNOSIS — N18.31 STAGE 3A CHRONIC KIDNEY DISEASE (HCC): ICD-10-CM

## 2024-08-06 DIAGNOSIS — E87.6 HYPOKALEMIA: ICD-10-CM

## 2024-08-06 DIAGNOSIS — I48.21 PERMANENT ATRIAL FIBRILLATION (HCC): ICD-10-CM

## 2024-08-06 DIAGNOSIS — Z79.01 CHRONIC ANTICOAGULATION: ICD-10-CM

## 2024-08-06 DIAGNOSIS — R60.0 BILATERAL LEG EDEMA: ICD-10-CM

## 2024-08-06 LAB
ALBUMIN SERPL-MCNC: 4.1 G/DL (ref 3.2–4.8)
ALBUMIN/GLOB SERPL: 1.3 {RATIO} (ref 1–2)
ALP LIVER SERPL-CCNC: 155 U/L
ALT SERPL-CCNC: 82 U/L
ANION GAP SERPL CALC-SCNC: 9 MMOL/L (ref 0–18)
AST SERPL-CCNC: 65 U/L (ref ?–34)
BILIRUB SERPL-MCNC: 1 MG/DL (ref 0.2–1.1)
BUN BLD-MCNC: 11 MG/DL (ref 9–23)
BUN/CREAT SERPL: 14.1 (ref 10–20)
CALCIUM BLD-MCNC: 9.1 MG/DL (ref 8.7–10.4)
CHLORIDE SERPL-SCNC: 102 MMOL/L (ref 98–112)
CO2 SERPL-SCNC: 29 MMOL/L (ref 21–32)
CREAT BLD-MCNC: 0.78 MG/DL
D DIMER PPP FEU-MCNC: 1.76 UG/ML FEU (ref ?–0.76)
EGFRCR SERPLBLD CKD-EPI 2021: 79 ML/MIN/1.73M2 (ref 60–?)
FASTING STATUS PATIENT QL REPORTED: NO
GLOBULIN PLAS-MCNC: 3.1 G/DL (ref 2–3.5)
GLUCOSE BLD-MCNC: 97 MG/DL (ref 70–99)
OSMOLALITY SERPL CALC.SUM OF ELEC: 289 MOSM/KG (ref 275–295)
POTASSIUM SERPL-SCNC: 2.8 MMOL/L (ref 3.5–5.1)
PROT SERPL-MCNC: 7.2 G/DL (ref 5.7–8.2)
SODIUM SERPL-SCNC: 140 MMOL/L (ref 136–145)

## 2024-08-06 PROCEDURE — 80053 COMPREHEN METABOLIC PANEL: CPT

## 2024-08-06 PROCEDURE — 93005 ELECTROCARDIOGRAM TRACING: CPT

## 2024-08-06 PROCEDURE — G2211 COMPLEX E/M VISIT ADD ON: HCPCS | Performed by: INTERNAL MEDICINE

## 2024-08-06 PROCEDURE — 36415 COLL VENOUS BLD VENIPUNCTURE: CPT

## 2024-08-06 PROCEDURE — 85379 FIBRIN DEGRADATION QUANT: CPT

## 2024-08-06 PROCEDURE — 99284 EMERGENCY DEPT VISIT MOD MDM: CPT

## 2024-08-06 PROCEDURE — 99215 OFFICE O/P EST HI 40 MIN: CPT | Performed by: INTERNAL MEDICINE

## 2024-08-06 PROCEDURE — 93970 EXTREMITY STUDY: CPT | Performed by: EMERGENCY MEDICINE

## 2024-08-06 PROCEDURE — 93010 ELECTROCARDIOGRAM REPORT: CPT

## 2024-08-06 RX ORDER — POTASSIUM CHLORIDE 20 MEQ/1
40 TABLET, EXTENDED RELEASE ORAL ONCE
Status: COMPLETED | OUTPATIENT
Start: 2024-08-06 | End: 2024-08-06

## 2024-08-06 RX ORDER — FUROSEMIDE 40 MG/1
40 TABLET ORAL DAILY
Qty: 30 TABLET | Refills: 1 | Status: SHIPPED | OUTPATIENT
Start: 2024-08-06 | End: 2024-08-06

## 2024-08-06 RX ORDER — POTASSIUM CHLORIDE 20 MEQ/1
20 TABLET, EXTENDED RELEASE ORAL 2 TIMES DAILY
Qty: 20 TABLET | Refills: 0 | Status: SHIPPED | OUTPATIENT
Start: 2024-08-06 | End: 2024-08-16

## 2024-08-06 NOTE — TELEPHONE ENCOUNTER
Spoke to patient. She states she had some swelling in her feet, and now her Left leg is swollen since 8/5. She states she can't bend her knee. Denies redness. Advised keep leg elevated, and check with her PCP. Patient verbalized understanding.

## 2024-08-06 NOTE — TELEPHONE ENCOUNTER
Spoke with Heidy who developed bilateral leg swelling 2 days ago. She stated her left leg is very swollen including left knee which is difficult to bend and right lower leg that is less swollen. Patient denied calf pain but does have generalized bilateral leg pain 4/10. Patient denied shortness of breath or chest pain. Patient is post op of hernia repair on 7/23/24. Patient called surgeon's office to report these symptoms and advised to call PCP. Patient is taking Xarelto.      Dispostion: Seen in office today but no available appointments.       Consulted with Dr. Vaughan who will overbook at 3:20 PM today for an appointment.     Spoke with Heidy who agreed to 3:20 PM appointment for today.   Overbooking scheduled today with Dr. Vaughan at 3:20 PM.    Reason for Disposition   Thigh, calf, or ankle swelling in only one leg    Answer Assessment - Initial Assessment Questions  1. ONSET: \"When did the swelling start?\" (e.g., minutes, hours, days)      2 days   2. LOCATION: \"What part of the leg is swollen?\"  \"Are both legs swollen or just one leg?\"      Left leg very swollen up to knee. Right leg is swelling but lower leg.   3. SEVERITY: \"How bad is the swelling?\" (e.g., localized; mild, moderate, severe)    - Localized: Small area of swelling localized to one leg.    - MILD pedal edema: Swelling limited to foot and ankle, pitting edema < 1/4 inch (6 mm) deep, rest and elevation eliminate most or all swelling.    - MODERATE edema: Swelling of lower leg to knee, pitting edema > 1/4 inch (6 mm) deep, rest and elevation only partially reduce swelling.    - SEVERE edema: Swelling extends above knee, facial or hand swelling present.       Left leg: moderate, right leg lower leg  4. REDNESS: \"Does the swelling look red or infected?\"      Denied  5. PAIN: \"Is the swelling painful to touch?\" If Yes, ask: \"How painful is it?\"   (Scale 1-10; mild, moderate or severe)      4/10  6. FEVER: \"Do you have a fever?\" If Yes, ask: \"What  is it, how was it measured, and when did it start?\"       Denied  7. CAUSE: \"What do you think is causing the leg swelling?\"      Unknown   8. MEDICAL HISTORY: \"Do you have a history of blood clots (e.g., DVT), cancer, heart failure, kidney disease, or liver failure?\"      Patient has pacemaker but no heart failure.   9. RECURRENT SYMPTOM: \"Have you had leg swelling before?\" If Yes, ask: \"When was the last time?\" \"What happened that time?\"      Denied  10. OTHER SYMPTOMS: \"Do you have any other symptoms?\" (e.g., chest pain, difficulty breathing)        Denied  11. PREGNANCY: \"Is there any chance you are pregnant?\" \"When was your last menstrual period?\"        N/A    Protocols used: Leg Swelling and Edema-A-OH

## 2024-08-06 NOTE — PROGRESS NOTES
Heidy Epstein female 74 year old         Chief Complaint   Patient presents with    Leg Swelling     Bilateral, left ankle very painful    Urinary     Urine has been cloudy     S/p  hernia  repairs July 23      Swelling started  2 days  ago  both legs      Had some  edema  earlier this summer     No sob    no  wheezing  no  cp        Patient Active Problem List   Diagnosis    Essential hypertension    Hypothyroidism    Ureteral stricture, right    Atrioventricular block, complete (HCC)    Dyslipidemia    Permanent atrial fibrillation (HCC)    Sick sinus syndrome (HCC)    Spinal stenosis of lumbar region    Bladder outlet obstruction    Spondylolisthesis of lumbosacral region    Hand cramps    Status post placement of cardiac pacemaker    History of urinary self-catheterization    Migraine with aura and without status migrainosus, not intractable    Post-op pain          Current Outpatient Medications on File Prior to Visit   Medication Sig Dispense Refill    magnesium 250 MG Oral Tab Take 1 tablet (250 mg total) by mouth in the morning and 1 tablet (250 mg total) before bedtime.      Vitamin C 500 MG Oral Tab Take 2 tablets (1,000 mg total) by mouth in the morning and 2 tablets (1,000 mg total) before bedtime.      Calcium-Magnesium-Vitamin D (CALCIUM 500 OR) Take 1,000 mg by mouth daily.      cholecalciferol 125 MCG (5000 UT) Oral Tab Take 1 tablet (5,000 Units total) by mouth daily.      Garlic 400 MG Oral Tab EC Take 1 tablet by mouth daily.      Cinnamon 500 MG Oral Cap Take 800 mg by mouth daily.      Probiotic Product (FORTIFY PROBIOTIC WOMENS OR) Take 1 tablet by mouth daily.      Calcium Polycarbophil (FIBER) 625 MG Oral Tab Take 1 tablet (625 mg total) by mouth at bedtime.      Cranberry 360 MG Oral Cap Take 1 capsule by mouth daily.      LEVOTHYROXINE 75 MCG Oral Tab TAKE 1 TABLET BY MOUTH EVERY DAY BEFORE BREAKFAST 90 tablet 3    LISINOPRIL-HYDROCHLOROTHIAZIDE 10-12.5 MG Oral Tab TAKE 1 TABLET BY  MOUTH EVERY DAY (Patient taking differently: Take 1 tablet by mouth every morning.) 90 tablet 0    ATORVASTATIN 20 MG Oral Tab TAKE 1 TABLET BY MOUTH EVERY DAY AT NIGHT 90 tablet 3    SUMAtriptan 50 MG Oral Tab Take 1 tablet (50 mg total) by mouth every 2 (two) hours as needed for Migraine. 9 tablet 2    Rivaroxaban 20 MG Oral Tab Take 1 tablet (20 mg total) by mouth every evening. 30 tablet 0    Echinacea-Goldenseal (ECHINACEA COMB/SAINI SEAL OR) Take by mouth daily.        Multiple Vitamins-Minerals (MULTIVITAMIN OR) Take 1 tablet by mouth nightly.         No current facility-administered medications on file prior to visit.          Vitals:    08/06/24 1517   BP: 132/60   Pulse: 61   VITALSBody mass index is 31.77 kg/m².    Pertinent findings on the physical exam; cor reg  no jvd  lungs clear   2 plus edema      Heidy was seen today for leg swelling and urinary.    Diagnoses and all orders for this visit:    Bilateral leg edema    Chronic anticoagulation    Permanent atrial fibrillation (HCC)    Stage 3a chronic kidney disease (HCC)         We discussed differential diagnosis of leg swelling.  Fact that its bilateral suggest a systemic process such as venous insufficiency heart failure, liver or kidney problems less likely DVT.      No obvious  chf  sx -      Will check labs for   liver  kidney dz     Check D-dimer    Rx lasix for  trial     .........  Her labs came back potassium was 2.8  D-dimer was elevated, also elevated alk phos and transaminase levels    I instructed her to go to the emergency room for the low potassium I think we need to replenish her potassium stores especially if our goal will be to diurese her.    Her elevated D-dimer is concerned I think she needs a ultrasound of the left leg to exclude DVT (even though on anticoagulation could still have a clot)  This note was prepared using Dragon Medical voice recognition dictation software and as a result, errors may occur. When identified, these  errors have been corrected. While every attempt is made to correct errors during dictation, discrepancies may still exist

## 2024-08-06 NOTE — TELEPHONE ENCOUNTER
Pt is calling stating that recently had surgery and a couples days now started with swelling on both ankles and feet, but now left leg is swollen and is not able to bend. Pt did mention that called surgeon but was told that needs to check with pcp.      Please call and advise

## 2024-08-06 NOTE — TELEPHONE ENCOUNTER
Per patient she had surgery 7/23 and as of 8/5 she began to have swelling in both feet, both ankle and her left leg. Please advise

## 2024-08-06 NOTE — ED INITIAL ASSESSMENT (HPI)
Pt sent here by her Doctor for low Potassium result, blood work done today.  Per pt she has been having bilateral lower leg swelling x 2 days.  Denies shortness of breath, denies chest pain.  A/Ox  4, breathing unlabored.

## 2024-08-07 ENCOUNTER — PATIENT OUTREACH (OUTPATIENT)
Dept: CASE MANAGEMENT | Age: 74
End: 2024-08-07

## 2024-08-07 LAB
ATRIAL RATE: 288 BPM
Q-T INTERVAL: 468 MS
QRS DURATION: 166 MS
QTC CALCULATION (BEZET): 494 MS
R AXIS: -60 DEGREES
T AXIS: 117 DEGREES
VENTRICULAR RATE: 67 BPM

## 2024-08-07 NOTE — ED PROVIDER NOTES
Patient Seen in: [facility] Emergency Department    History     Chief Complaint   Patient presents with    Abnormal Labs       HPI    74-year-old female sent by her PCP given hypokalemia and elevated D-dimer on outpatient labs.  Patient reports temporary with holding her anticoagulation as instructed for recent surgery and since then approximately 2 weeks ago has noted lower extremity bilateral edema, symmetric.  She denies any chest pain or dyspnea.    History reviewed.   Past Medical History:    A-fib (HCC)    managed    Abnormal cholesterol test    CHOLESTEROL 1998 PER. NG.    Arrhythmia    Atrial Fibrillation    Arthritis    Back problem    Cataract    Disorder of thyroid    H/O complete eye exam    H/O diagnostic mammography    H/O exercise stress test    High blood pressure    High cholesterol    HTN (hypertension)    Hyperlipidemia    Hyperlipidemia    Hypothyroidism    Migraines    Neurogenic bladder    Pacemaker    Pulmonary embolism (HCC)    On patients HX client communicates no PE?     Self-catheterizes urinary bladder    Visual impairment    glasses       History reviewed.   Past Surgical History:   Procedure Laterality Date    Carpal tunnel release Right     Colonoscopy  2010    Colonoscopy N/A 08/13/2020    Procedure: COLONOSCOPY;  Surgeon: Gino Lawson MD;  Location: [facility] ENDOSCOPY    Other      Jaw surgery    Other Right     elbow surgery    Other Right     ankle surgery    Pacemaker      Tonsillectomy           Medications :  (Not in a hospital admission)       Family History   Problem Relation Age of Onset    Heart Disease Father     Lipids Father         HYPERLIPIDEMIA    Hypertension Father     Cancer Mother         STOMACH,COLON    Heart Disease Mother     Breast Cancer Other 28       Smoking Status:   Social History     Socioeconomic History    Marital status:    Tobacco Use    Smoking status: Never    Smokeless tobacco: Never   Vaping Use    Vaping status: Never Used    Substance and Sexual Activity    Alcohol use: Not Currently     Comment: social    Drug use: Never   Other Topics Concern    Caffeine Concern No     Comment: SODA 8OZ/DAY       Constitutional and vital signs reviewed.      Social History and Family History elements reviewed from today, pertinent positives to the presenting problem noted.    Physical Exam     ED Triage Vitals   BP 08/06/24 1856 112/72   Pulse 08/06/24 1856 74   Resp 08/06/24 1856 17   Temp 08/06/24 1856 98.8 °F (37.1 °C)   Temp src 08/06/24 1856 Oral   SpO2 08/06/24 1856 96 %   O2 Device 08/06/24 1915 None (Room air)       All measures to prevent infection transmission during my interaction with the patient were taken. The patient was already wearing a droplet mask on my arrival to the room. Personal protective equipment was worn throughout the duration of the exam.  Handwashing was performed prior to and after the exam.  Stethoscope and any equipment used during my examination was cleaned with super sani-cloth germicidal wipes following the exam.     Physical Exam    General: NAD  Head: Normocephalic and atraumatic.  Mouth/Throat/Ears/Nose: Oropharynx is clear   Eyes: Conjunctivae and EOM are normal.   Neck: Normal range of motion. Supple.   Cardiovascular: Normal rate, regular rhythm   Respiratory/Chest: Clear and equal bilaterally. Exhibits no tenderness.  Gastrointestinal: Soft, non-tender, non-distended. Bowel sounds are normal.   Musculoskeletal:No deformity. Mild pitting symmetric lower extremity edema  Neurological: Alert and appropriate. No focal deficits.   Skin: Skin is warm and dry. No pallor.  Psychiatric: Has a normal mood and affect.      ED Course      Labs Reviewed - No data to display  EKG    Rate, intervals and axes as noted on EKG Report.  Rate: 67  Rhythm: Ventricular paced rhythm  Reading: No STEMI.  Ventricular paced rhythm           As Interpreted by me    Imaging Results Available and Reviewed while in ED: US VENOUS DOPPLER  LEG BILAT - DIAG IMG (CPT=93970)    Result Date: 8/6/2024  CONCLUSION:  1. No lower extremity DVT bilaterally. 2. Small left greater than right popliteal/Baker's cyst.    Dictated by (CST): Franky Bliss MD on 8/06/2024 at 9:44 PM     Finalized by (CST): Franky Bliss MD on 8/06/2024 at 9:46 PM         ED Medications Administered:   Medications   potassium chloride (Klor-Con M20) tab 40 mEq (40 mEq Oral Given 8/6/24 2001)         MDM     Vitals:    08/06/24 2115 08/06/24 2130 08/06/24 2145 08/06/24 2215   BP: 127/55 124/60 132/71 126/62   Pulse: 60 59 60 60   Resp: (!) 28 (!) 27 26 (!) 28   Temp:       TempSrc:       SpO2: 94% 94% 97% 96%   Weight:       Height:         *I personally reviewed and interpreted all ED vitals.    Pulse Ox: 94%, Room air, Normal     Monitor Interpretation:   Ventricular paced rhythm as interpreted by me.  The cardiac monitor was ordered given hypokalemia.      Medical Decision Making      Differential Diagnosis/ Diagnostic Considerations: Hypokalemia, lymphedema, CHF    Complicating Factors: The patient already has hypertension to contribute to the complexity of this ED evaluation.    I reviewed prior chart records including office visit note from earlier today prompting referral to the emergency department.  The patient here has hypokalemia on my interpretation of the lab work from earlier today.  P.o. repletion of potassium chloride was provided and a prescription of the same is also provided.  DVT unlikely however given elevated D-dimer, ultrasounds of the lower extremities were obtained and negative for DVT.  Patient understands to follow-up closely with PCP.  She already received prescription of Lasix by her primary care doctor.  Discharged home in stable condition    Disposition and Plan     Clinical Impression:  1. Bilateral leg edema    2. Hypokalemia        Disposition:  Discharge    Follow-up:  Eleazar Vaughan  [address] [facility] [address]  [facility]  [id number]  [phone]    Schedule an appointment as soon as possible for a visit in 1 day(s)        Medications Prescribed:  Discharge Medication List as of 8/6/2024 10:25 PM        START taking these medications    Details   potassium chloride 20 MEQ Oral Tab CR Take 1 tablet (20 mEq total) by mouth 2 (two) times daily for 10 days., Normal, Disp-20 tablet, R-0

## 2024-08-07 NOTE — PROGRESS NOTES
Pt had recent ED visit, calling to offer [facility] ED follow-up apt (discharged 08/06)    Pt has existing apt Mon 08/12 @3:40pm  Closing encounter

## 2024-08-12 ENCOUNTER — OFFICE VISIT (OUTPATIENT)
Dept: INTERNAL MEDICINE CLINIC | Facility: CLINIC | Age: 74
End: 2024-08-12
Payer: MEDICARE

## 2024-08-12 ENCOUNTER — OFFICE VISIT (OUTPATIENT)
Dept: SURGERY | Facility: CLINIC | Age: 74
End: 2024-08-12
Payer: MEDICARE

## 2024-08-12 ENCOUNTER — LAB ENCOUNTER (OUTPATIENT)
Dept: LAB | Facility: HOSPITAL | Age: 74
End: 2024-08-12
Attending: INTERNAL MEDICINE
Payer: MEDICARE

## 2024-08-12 VITALS
HEIGHT: 59 IN | OXYGEN SATURATION: 96 % | HEART RATE: 63 BPM | BODY MASS INDEX: 29.84 KG/M2 | WEIGHT: 148 LBS | DIASTOLIC BLOOD PRESSURE: 60 MMHG | SYSTOLIC BLOOD PRESSURE: 126 MMHG

## 2024-08-12 VITALS — WEIGHT: 153 LBS | BODY MASS INDEX: 31 KG/M2

## 2024-08-12 DIAGNOSIS — E04.1 THYROID NODULE: ICD-10-CM

## 2024-08-12 DIAGNOSIS — E87.6 HYPOKALEMIA: ICD-10-CM

## 2024-08-12 DIAGNOSIS — Z48.89 ENCOUNTER FOR POSTOPERATIVE CARE: Primary | ICD-10-CM

## 2024-08-12 DIAGNOSIS — R79.89 ELEVATED LFTS: Primary | ICD-10-CM

## 2024-08-12 DIAGNOSIS — I48.21 PERMANENT ATRIAL FIBRILLATION (HCC): ICD-10-CM

## 2024-08-12 DIAGNOSIS — I71.21 ANEURYSM OF THE ASCENDING AORTA, WITHOUT RUPTURE (HCC): ICD-10-CM

## 2024-08-12 DIAGNOSIS — E87.6 HYPOKALEMIA DUE TO EXCESSIVE GASTROINTESTINAL LOSS OF POTASSIUM: ICD-10-CM

## 2024-08-12 DIAGNOSIS — Z79.01 CHRONIC ANTICOAGULATION: ICD-10-CM

## 2024-08-12 DIAGNOSIS — R60.0 BILATERAL LEG EDEMA: ICD-10-CM

## 2024-08-12 DIAGNOSIS — K43.9 VENTRAL HERNIA WITHOUT OBSTRUCTION OR GANGRENE: ICD-10-CM

## 2024-08-12 DIAGNOSIS — R79.89 ELEVATED LFTS: ICD-10-CM

## 2024-08-12 LAB
ALBUMIN SERPL-MCNC: 4.3 G/DL (ref 3.2–4.8)
ALBUMIN/GLOB SERPL: 1.3 {RATIO} (ref 1–2)
ALP LIVER SERPL-CCNC: 173 U/L
ALT SERPL-CCNC: 89 U/L
ANION GAP SERPL CALC-SCNC: 8 MMOL/L (ref 0–18)
AST SERPL-CCNC: 48 U/L (ref ?–34)
BILIRUB SERPL-MCNC: 0.7 MG/DL (ref 0.2–1.1)
BUN BLD-MCNC: 11 MG/DL (ref 9–23)
BUN/CREAT SERPL: 12.8 (ref 10–20)
CALCIUM BLD-MCNC: 9.6 MG/DL (ref 8.7–10.4)
CHLORIDE SERPL-SCNC: 104 MMOL/L (ref 98–112)
CO2 SERPL-SCNC: 28 MMOL/L (ref 21–32)
CREAT BLD-MCNC: 0.86 MG/DL
EGFRCR SERPLBLD CKD-EPI 2021: 70 ML/MIN/1.73M2 (ref 60–?)
FASTING STATUS PATIENT QL REPORTED: NO
GLOBULIN PLAS-MCNC: 3.4 G/DL (ref 2–3.5)
GLUCOSE BLD-MCNC: 132 MG/DL (ref 70–99)
MAGNESIUM SERPL-MCNC: 2.2 MG/DL (ref 1.6–2.6)
OSMOLALITY SERPL CALC.SUM OF ELEC: 291 MOSM/KG (ref 275–295)
POTASSIUM SERPL-SCNC: 4.7 MMOL/L (ref 3.5–5.1)
PROT SERPL-MCNC: 7.7 G/DL (ref 5.7–8.2)
SODIUM SERPL-SCNC: 140 MMOL/L (ref 136–145)
T4 FREE SERPL-MCNC: 1.4 NG/DL (ref 0.8–1.7)
TSI SER-ACNC: 8.36 MIU/ML (ref 0.55–4.78)

## 2024-08-12 PROCEDURE — 83735 ASSAY OF MAGNESIUM: CPT

## 2024-08-12 PROCEDURE — 80053 COMPREHEN METABOLIC PANEL: CPT

## 2024-08-12 PROCEDURE — 99214 OFFICE O/P EST MOD 30 MIN: CPT | Performed by: INTERNAL MEDICINE

## 2024-08-12 PROCEDURE — 84443 ASSAY THYROID STIM HORMONE: CPT

## 2024-08-12 PROCEDURE — 36415 COLL VENOUS BLD VENIPUNCTURE: CPT

## 2024-08-12 PROCEDURE — 99212 OFFICE O/P EST SF 10 MIN: CPT

## 2024-08-12 PROCEDURE — G2211 COMPLEX E/M VISIT ADD ON: HCPCS | Performed by: INTERNAL MEDICINE

## 2024-08-12 PROCEDURE — 84439 ASSAY OF FREE THYROXINE: CPT

## 2024-08-12 NOTE — PROGRESS NOTES
S:  Heidy Epstein is a 74 year old female sp repair of ventral incisional hernias with Dr. Martinez  Doing well, no fevers, no chills, tolerating a general diet, generally normal bowel movements, no calf tenderness nor lower extremity edema, no shortness of breath, no chest pain.       O:  Wt 153 lb (69.4 kg)   BMI 30.90 kg/m²   GEN:  No acute distress  L: nonlabored respirations  H: reg rate  Abd:  Soft, NT,ND.  Skin: Incision C D I, no eryth. Staples removed, wound well approximated  Extr: No edema, no calf tenderness    Path:  Reviewed w pt    Assessment   1. Encounter for postoperative care        Doing well post op, staples removed  Continue to avoid heavy lifting for another few weeks.  Continue to keep incision clean and dry.  Maintain a healthy diet.  Maintain good hydration.  F/u prn.         Geo Blakely PA-C

## 2024-08-12 NOTE — PROGRESS NOTES
Heidy Epstein female 74 year old         Chief Complaint   Patient presents with    Leg Swelling, hypokalemia   We discussed her leg swelling and low potassium.  Sent her to the emergency room she ruled out for DVT with bilateral ultrasound.  Also because her potassium was in the twos.  She was given oral supplementation not IV.  Because of the low potassium has not been able to take Lasix but restarted back on lisinopril hydrochlorothiazide leg swelling has improved.    Endorses that she had diarrhea that is now abated.  No  nausea  vomiting  or  biliary   colic       Diarrhea    is better -  thinks do to  diet  soups  etc        Leg  edema  better  on  lis  hydrochlorothiazide  -has no heart failure symptoms.  Denies any shortness of breath or orthopnea      Elevated lft noted on labs as well.      Patient Active Problem List   Diagnosis    Essential hypertension    Hypothyroidism    Ureteral stricture, right    Atrioventricular block, complete (HCC)    Dyslipidemia    Permanent atrial fibrillation (HCC)    Sick sinus syndrome (HCC)    Spinal stenosis of lumbar region    Bladder outlet obstruction    Spondylolisthesis of lumbosacral region    Hand cramps    Status post placement of cardiac pacemaker    History of urinary self-catheterization    Migraine with aura and without status migrainosus, not intractable    Post-op pain    Aneurysm of the ascending aorta, without rupture (HCC)          Current Outpatient Medications on File Prior to Visit   Medication Sig Dispense Refill    potassium chloride 20 MEQ Oral Tab CR Take 1 tablet (20 mEq total) by mouth 2 (two) times daily for 10 days. 20 tablet 0    magnesium 250 MG Oral Tab Take 1 tablet (250 mg total) by mouth in the morning and 1 tablet (250 mg total) before bedtime.      Vitamin C 500 MG Oral Tab Take 2 tablets (1,000 mg total) by mouth in the morning and 2 tablets (1,000 mg total) before bedtime.      Calcium-Magnesium-Vitamin D (CALCIUM 500 OR) Take  1,000 mg by mouth daily.      cholecalciferol 125 MCG (5000 UT) Oral Tab Take 1 tablet (5,000 Units total) by mouth daily.      Garlic 400 MG Oral Tab EC Take 1 tablet by mouth daily.      Cinnamon 500 MG Oral Cap Take 800 mg by mouth daily.      Probiotic Product (FORTIFY PROBIOTIC WOMENS OR) Take 1 tablet by mouth daily.      Calcium Polycarbophil (FIBER) 625 MG Oral Tab Take 1 tablet (625 mg total) by mouth at bedtime.      Cranberry 360 MG Oral Cap Take 1 capsule by mouth daily.      LEVOTHYROXINE 75 MCG Oral Tab TAKE 1 TABLET BY MOUTH EVERY DAY BEFORE BREAKFAST 90 tablet 3    LISINOPRIL-HYDROCHLOROTHIAZIDE 10-12.5 MG Oral Tab TAKE 1 TABLET BY MOUTH EVERY DAY (Patient taking differently: Take 1 tablet by mouth every morning.) 90 tablet 0    ATORVASTATIN 20 MG Oral Tab TAKE 1 TABLET BY MOUTH EVERY DAY AT NIGHT 90 tablet 3    SUMAtriptan 50 MG Oral Tab Take 1 tablet (50 mg total) by mouth every 2 (two) hours as needed for Migraine. 9 tablet 2    Rivaroxaban 20 MG Oral Tab Take 1 tablet (20 mg total) by mouth every evening. 30 tablet 0    Echinacea-Goldenseal (ECHINACEA COMB/SAINI SEAL OR) Take by mouth daily.        Multiple Vitamins-Minerals (MULTIVITAMIN OR) Take 1 tablet by mouth nightly.         No current facility-administered medications on file prior to visit.          Vitals:    08/12/24 1526   BP: 126/60   Pulse: 63   VITALSBody mass index is 29.89 kg/m².    Pertinent findings on the physical exam; she looks healthy.  Abdomen benign no JVD heart regular (has A-fib seems regular) no S3 edema has improved significantly.    Heidy was seen today for leg swelling.    Diagnoses and all orders for this visit:    Elevated LFTs  -     Comp Metabolic Panel (14); Future    Hypokalemia  -     Comp Metabolic Panel (14); Future    Hypokalemia due to excessive gastrointestinal loss of potassium    Bilateral leg edema    Aneurysm of the ascending aorta, without rupture (HCC)    Chronic anticoagulation    Permanent  atrial fibrillation (HCC)    Ventral hernia without obstruction or gangrene       Presentation is somewhat complicated.  I have no obvious cause for her leg swelling.  She ruled out for DVT.  Labs did show some elevated liver transaminase levels and alk phos.  I do not think that this is congestive.  She has no obvious heart failure.    Leg swelling is better at the present time back on hydrochlorothiazide and ACE inhibitor.    She had some diarrhea which certainly could have contributed to the hypokalemia.    She has asymptomatic aneurysm I do not think this is a cause of her problem    On chronic anticoagulation for history of A-fib.    Will recheck blood work to make sure that liver enzymes come down.  If not we will obtain ultrasound or possibly MRCP.    Will also check thyroid.  Has history of hypothyroidism levothyroxine    This note was prepared using Dragon Medical voice recognition dictation software and as a result, errors may occur. When identified, these errors have been corrected. While every attempt is made to correct errors during dictation, discrepancies may still exist

## 2024-08-13 ENCOUNTER — TELEPHONE (OUTPATIENT)
Dept: INTERNAL MEDICINE CLINIC | Facility: CLINIC | Age: 74
End: 2024-08-13

## 2024-08-13 DIAGNOSIS — R79.89 ELEVATED LFTS: Primary | ICD-10-CM

## 2024-08-13 NOTE — PROGRESS NOTES
Discussed labs potassium back to normal.  Still has elevated liver enzymes we will recheck again in 1 week she states she has no GI symptoms at all.

## 2024-08-13 NOTE — TELEPHONE ENCOUNTER
Per MD routing comment:  \"Please have her take it just once a day for the next 4 days and then can stop it. \"    Spoke to pt and advised on MD message below; pt verbalized understanding.

## 2024-08-13 NOTE — TELEPHONE ENCOUNTER
Pt is calling stating that doctor had called pt to given her some instructions, but pt states that doctor did not mention if she should stop taking potassium.      Please call and advise

## 2024-08-21 ENCOUNTER — PATIENT MESSAGE (OUTPATIENT)
Dept: SURGERY | Facility: CLINIC | Age: 74
End: 2024-08-21

## 2024-08-22 ENCOUNTER — LAB ENCOUNTER (OUTPATIENT)
Dept: LAB | Facility: HOSPITAL | Age: 74
End: 2024-08-22
Attending: INTERNAL MEDICINE
Payer: MEDICARE

## 2024-08-22 DIAGNOSIS — R79.89 ELEVATED LFTS: ICD-10-CM

## 2024-08-22 LAB
ALBUMIN SERPL-MCNC: 4.4 G/DL (ref 3.2–4.8)
ALBUMIN/GLOB SERPL: 1.4 {RATIO} (ref 1–2)
ALP LIVER SERPL-CCNC: 105 U/L
ALT SERPL-CCNC: 20 U/L
ANION GAP SERPL CALC-SCNC: 10 MMOL/L (ref 0–18)
AST SERPL-CCNC: 20 U/L (ref ?–34)
BILIRUB SERPL-MCNC: 0.7 MG/DL (ref 0.2–1.1)
BUN BLD-MCNC: 11 MG/DL (ref 9–23)
BUN/CREAT SERPL: 12 (ref 10–20)
CALCIUM BLD-MCNC: 10 MG/DL (ref 8.7–10.4)
CHLORIDE SERPL-SCNC: 102 MMOL/L (ref 98–112)
CO2 SERPL-SCNC: 27 MMOL/L (ref 21–32)
CREAT BLD-MCNC: 0.92 MG/DL
EGFRCR SERPLBLD CKD-EPI 2021: 65 ML/MIN/1.73M2 (ref 60–?)
FASTING STATUS PATIENT QL REPORTED: YES
GLOBULIN PLAS-MCNC: 3.2 G/DL (ref 2–3.5)
GLUCOSE BLD-MCNC: 94 MG/DL (ref 70–99)
OSMOLALITY SERPL CALC.SUM OF ELEC: 287 MOSM/KG (ref 275–295)
POTASSIUM SERPL-SCNC: 4.1 MMOL/L (ref 3.5–5.1)
PROT SERPL-MCNC: 7.6 G/DL (ref 5.7–8.2)
SODIUM SERPL-SCNC: 139 MMOL/L (ref 136–145)

## 2024-08-22 PROCEDURE — 36415 COLL VENOUS BLD VENIPUNCTURE: CPT

## 2024-08-22 PROCEDURE — 80053 COMPREHEN METABOLIC PANEL: CPT

## 2024-08-27 ENCOUNTER — TELEPHONE (OUTPATIENT)
Dept: INTERNAL MEDICINE CLINIC | Facility: CLINIC | Age: 74
End: 2024-08-27

## 2024-08-27 NOTE — TELEPHONE ENCOUNTER
----- Message from Eleazar Vaughan sent at 8/25/2024  2:36 PM CDT -----  Please call and let know potassium and labs look good

## 2024-08-27 NOTE — TELEPHONE ENCOUNTER
From: Heidy Epstein  To: Nayeli Deleon  Sent: 2024 9:52 AM CDT  Subject: Renewal of urine test    Last time I had a test the lab said the order  in August. I have had surgery and I think I have another UTI. I would like to go to the lab on Friday when I go for a blood test.  Thanks,  Heidy Epstein

## 2024-08-29 RX ORDER — FUROSEMIDE 40 MG
40 TABLET ORAL DAILY
Qty: 90 TABLET | Refills: 0 | Status: SHIPPED | OUTPATIENT
Start: 2024-08-29

## 2024-08-29 RX ORDER — FUROSEMIDE 40 MG
40 TABLET ORAL DAILY
COMMUNITY
Start: 2024-08-06 | End: 2024-08-29

## 2024-08-29 NOTE — TELEPHONE ENCOUNTER
MEDICATION REFILL REQUEST:    Future Appointment: 09/10/2024    Last appointment: 08/12/2024    Medication requested:   Requested Prescriptions     Pending Prescriptions Disp Refills    furosemide 40 MG Oral Tab 90 tablet 0     Sig: Take 1 tablet (40 mg total) by mouth daily.       Last refill date:   furosemide 40 MG Oral Tab (Discontinued) 30 tablet 1 8/6/2024       Protocol Status:     Hypertension Medications Protocol [id number]/29/2024 09:58 AM   Protocol Details CMP or BMP in past 12 months    Last BP reading less than 140/90    In person appointment or virtual visit in the past 12 mos or appointment in next 3 mos    EGFRCR or GFRNAA > 50

## 2024-08-30 RX ORDER — LISINOPRIL/HYDROCHLOROTHIAZIDE 10-12.5 MG
1 TABLET ORAL DAILY
Qty: 90 TABLET | Refills: 0 | Status: SHIPPED | OUTPATIENT
Start: 2024-08-30

## 2024-09-10 ENCOUNTER — OFFICE VISIT (OUTPATIENT)
Dept: INTERNAL MEDICINE CLINIC | Facility: CLINIC | Age: 74
End: 2024-09-10
Payer: MEDICARE

## 2024-09-10 VITALS
DIASTOLIC BLOOD PRESSURE: 74 MMHG | OXYGEN SATURATION: 98 % | SYSTOLIC BLOOD PRESSURE: 122 MMHG | WEIGHT: 144 LBS | BODY MASS INDEX: 29.03 KG/M2 | HEART RATE: 98 BPM | HEIGHT: 59 IN

## 2024-09-10 DIAGNOSIS — E03.9 ACQUIRED HYPOTHYROIDISM: ICD-10-CM

## 2024-09-10 DIAGNOSIS — R60.0 BILATERAL LEG EDEMA: ICD-10-CM

## 2024-09-10 DIAGNOSIS — E87.6 HYPOKALEMIA: ICD-10-CM

## 2024-09-10 DIAGNOSIS — I48.21 PERMANENT ATRIAL FIBRILLATION (HCC): ICD-10-CM

## 2024-09-10 DIAGNOSIS — R25.1 TREMOR: Primary | ICD-10-CM

## 2024-09-10 PROCEDURE — 99214 OFFICE O/P EST MOD 30 MIN: CPT | Performed by: INTERNAL MEDICINE

## 2024-09-10 PROCEDURE — G2211 COMPLEX E/M VISIT ADD ON: HCPCS | Performed by: INTERNAL MEDICINE

## 2024-09-10 NOTE — PROGRESS NOTES
Heidy Epstein female 74 year old         Chief Complaint   Patient presents with    Follow - Up     F/u labs      Has  trmor  of  right  thumb  wih  flexion  also ocass tremor in right  arm        Edema  gone  - never  took lasix     No sob or  cp -  last   labs  good  k nl        Has  bilat   groin    discomfort  pain  S?p hernia  repair      Right  shoulder  and  neck  pain  - better  with  heating  pad           Patient Active Problem List   Diagnosis    Essential hypertension    Hypothyroidism    Ureteral stricture, right    Atrioventricular block, complete (HCC)    Dyslipidemia    Permanent atrial fibrillation (HCC)    Sick sinus syndrome (HCC)    Spinal stenosis of lumbar region    Bladder outlet obstruction    Spondylolisthesis of lumbosacral region    Hand cramps    Status post placement of cardiac pacemaker    History of urinary self-catheterization    Migraine with aura and without status migrainosus, not intractable    Post-op pain    Aneurysm of the ascending aorta, without rupture ([facility])          Current Outpatient Medications on File Prior to Visit   Medication Sig Dispense Refill    LISINOPRIL-HYDROCHLOROTHIAZIDE 10-12.5 MG Oral Tab TAKE 1 TABLET BY MOUTH EVERY DAY 90 tablet 0    furosemide 40 MG Oral Tab Take 1 tablet (40 mg total) by mouth daily. 90 tablet 0    magnesium 250 MG Oral Tab Take 1 tablet (250 mg total) by mouth in the morning and 1 tablet (250 mg total) before bedtime.      Vitamin C 500 MG Oral Tab Take 2 tablets (1,000 mg total) by mouth in the morning and 2 tablets (1,000 mg total) before bedtime.      Calcium-Magnesium-Vitamin D (CALCIUM 500 OR) Take 1,000 mg by mouth daily.      cholecalciferol 125 MCG (5000 UT) Oral Tab Take 1 tablet (5,000 Units total) by mouth daily.      Garlic 400 MG Oral Tab EC Take 1 tablet by mouth daily.      Cinnamon 500 MG Oral Cap Take 800 mg by mouth daily.      Probiotic Product (FORTIFY PROBIOTIC WOMENS OR) Take 1 tablet by mouth daily.       Calcium Polycarbophil (FIBER) 625 MG Oral Tab Take 1 tablet (625 mg total) by mouth at bedtime.      Cranberry 360 MG Oral Cap Take 1 capsule by mouth daily.      LEVOTHYROXINE 75 MCG Oral Tab TAKE 1 TABLET BY MOUTH EVERY DAY BEFORE BREAKFAST 90 tablet 3    ATORVASTATIN 20 MG Oral Tab TAKE 1 TABLET BY MOUTH EVERY DAY AT NIGHT 90 tablet 3    SUMAtriptan 50 MG Oral Tab Take 1 tablet (50 mg total) by mouth every 2 (two) hours as needed for Migraine. 9 tablet 2    Rivaroxaban 20 MG Oral Tab Take 1 tablet (20 mg total) by mouth every evening. 30 tablet 0    Echinacea-Goldenseal (ECHINACEA COMB/SAINI SEAL OR) Take by mouth daily.        Multiple Vitamins-Minerals (MULTIVITAMIN OR) Take 1 tablet by mouth nightly.         No current facility-administered medications on file prior to visit.          Vitals:    09/10/24 0936   BP: 122/74   Pulse: 98   VITALSBody mass index is 29.08 kg/m².    Pertinent findings on the physical exam; neck  tender in right side and trap -    abd  incisin good no path  palpated -  no edema  , has  bilat  tremor     Mild cog wheeling  in right arm  with  distraction      Heidy was seen today for follow - up.    Diagnoses and all orders for this visit:    Tremor  -     NEURO - INTERNAL    Hypokalemia    Bilateral leg edema    Permanent atrial fibrillation (HCC)    Acquired hypothyroidism       Will refer to neuro  I wonder if early parinsons     Abd  discomfort   prob do to  surgery  sutures  doubt  path -  if  not improved in  3 weeks  see surgery - if any red flags  sooner    Low k better       Edema  gone - never took lasix  no obvious cause obtained      Keep on xarelto  for afib     Needs massage therapy for neck     Is off statin now - discussed izabella and  risk  - will reconsider      Take  levo  with out  calcium -  tsh in future        Last  LFT improved      See back in  2 mos     This note was prepared using Dragon Medical voice recognition dictation software and as a result, errors may  occur. When identified, these errors have been corrected. While every attempt is made to correct errors during dictation, discrepancies may still exist

## 2024-09-11 ENCOUNTER — OFFICE VISIT (OUTPATIENT)
Dept: SURGERY | Facility: CLINIC | Age: 74
End: 2024-09-11
Payer: MEDICARE

## 2024-09-11 DIAGNOSIS — N39.0 RECURRENT UTI: Primary | ICD-10-CM

## 2024-09-11 PROCEDURE — 99214 OFFICE O/P EST MOD 30 MIN: CPT | Performed by: UROLOGY

## 2024-09-11 RX ORDER — ESTRADIOL 0.1 MG/G
CREAM VAGINAL
Qty: 42.5 G | Refills: 11 | Status: SHIPPED | OUTPATIENT
Start: 2024-09-11

## 2024-09-11 NOTE — PROGRESS NOTES
Nayeli Deleon MD  Department of Urology  [address]  [place] [address]    T: [phone]  F: [phone]    To: Eleazar Vaughan MD   [address] [facility] [address]  [facility] [address]    Re: Heidy Epstein   MRN: [id number]  : 1950    Dear Eleazar Vaughan MD,    Today I had the pleasure of seeing Heidy Epstein in my clinic. As you know, Ms. Epstein is a pleasant 74 year old year old female who I am seeing for followup. Patient was last seen in this department on Visit date not found.    Briefly, patient is formally followed by Dr. Arvizu for right-sided hydroureteronephrosis.  She has had a nuclear medicine scan in  that demonstrated a function of 15% on the right side with delayed excretion parameters.  She did undergo balloon dilation of her right ureteral stricture and evaluation for malignancy all of which was benign.  Last renal imaging was 2022 that demonstrates no hydroureteronephrosis bilaterally and stable mild.     Please note that she also has neurogenic bladder and catheterizes 4 times a day.  She apparently has had a urodynamic evaluation at [facility] which I do not have the results.  Per chart review it appears that she has a large bladder capacity with normal compliance, no reflux and detrusor underactivity.  She has previously been offered InterStim but was deemed to not be a candidate given low success rate with her condition.     At prior visit we discussed behavioral management for UTI prevention.  She has gone through a lot over the last 6 months including a perforated bowel requiring colostomy and revision.  She feels overall on the mend.     Plan at last visit on 2023 he was to return to clinic as needed.    Since she was last seen me she has had numerous urine cultures checked which have returned E. coli and Klebsiella.      PAST MEDICAL HISTORY:  Past Medical History:    A-fib (HCC)    managed    Abnormal cholesterol test     CHOLESTEROL 1998 PER. NG.    Arrhythmia    Atrial Fibrillation    Arthritis    Back problem    Cataract    Disorder of thyroid    H/O complete eye exam    H/O diagnostic mammography    H/O exercise stress test    High blood pressure    High cholesterol    HTN (hypertension)    Hyperlipidemia    Hyperlipidemia    Hypothyroidism    Migraines    Neurogenic bladder    Pacemaker    Pulmonary embolism (HCC)    On patients HX client communicates no PE?     Self-catheterizes urinary bladder    Visual impairment    glasses        PAST SURGICAL HISTORY:  Past Surgical History:   Procedure Laterality Date    Carpal tunnel release Right     Colonoscopy  2010    Colonoscopy N/A 08/13/2020    Procedure: COLONOSCOPY;  Surgeon: Gino Lawson MD;  Location: [facility] ENDOSCOPY    Other      Jaw surgery    Other Right     elbow surgery    Other Right     ankle surgery    Pacemaker      Tonsillectomy           ALLERGIES:  Allergies   Allergen Reactions    Levaquin [Levofloxacin] HIVES    Vancomycin HIVES and OTHER (SEE COMMENTS)         MEDICATIONS:  Current Outpatient Medications   Medication Instructions    Calcium Polycarbophil (FIBER) 625 MG Oral Tab 1 tablet, Oral, Nightly    Calcium-Magnesium-Vitamin D (CALCIUM 500 OR) 1,000 mg, Oral, Daily    cholecalciferol (VITAMIN D3) 5,000 Units, Oral, Daily    Cinnamon 800 mg, Oral, Daily    Cranberry 360 MG Oral Cap 1 capsule, Oral, Daily    Echinacea-Goldenseal (ECHINACEA COMB/SAINI SEAL OR) Oral, Daily    Garlic 400 MG Oral Tab EC 1 tablet, Oral, Daily    LEVOTHYROXINE 75 MCG Oral Tab TAKE 1 TABLET BY MOUTH EVERY DAY BEFORE BREAKFAST    LISINOPRIL-HYDROCHLOROTHIAZIDE 10-12.5 MG Oral Tab 1 tablet, Oral, Daily    magnesium 250 mg, Oral, 2 times daily    Multiple Vitamins-Minerals (MULTIVITAMIN OR) 1 tablet, Oral, Nightly    Probiotic Product (FORTIFY PROBIOTIC WOMENS OR) 1 tablet, Oral, Daily    rivaroxaban (XARELTO) 20 mg, Oral, Every evening    SUMAtriptan (IMITREX) 50 mg, Oral, Every 2 hour  PRN    Vitamin C (VITAMIN C) 1,000 mg, Oral, 2 times daily        FAMILY HISTORY:  Family History   Problem Relation Age of Onset    Heart Disease Father     Lipids Father         HYPERLIPIDEMIA    Hypertension Father     Cancer Mother         STOMACH,COLON    Heart Disease Mother     Breast Cancer Other 28        SOCIAL HISTORY:  Social History     Socioeconomic History    Marital status:    Tobacco Use    Smoking status: Never    Smokeless tobacco: Never   Vaping Use    Vaping status: Never Used   Substance and Sexual Activity    Alcohol use: Not Currently     Comment: social    Drug use: Never   Other Topics Concern    Caffeine Concern No     Comment: SODA 8OZ/DAY          PHYSICAL EXAMINATION:  There were no vitals filed for this visit.  CONSTITUTIONAL: No apparent distress, cooperative and communicative  NEUROLOGIC: Alert and oriented   HEAD: Normocephalic, atraumatic   EYES: Sclera non-icteric   ENT: Hearing intact, moist mucous membranes   NECK: No obvious goiter or masses   RESPIRATORY: Normal respiratory effort, Nonlabored breathing on room air  SKIN: No evident rashes   ABDOMEN: Soft, nontender, nondistended, no rebound tenderness, no guarding, no masses      REVIEW OF SYSTEMS:    A comprehensive 10-point review of systems was completed.  Pertinent positives and negatives are noted in the the HPI.       LABORATORY DATA:  URINE CULTURE >100,000 CFU/ML Escherichia coli Abnormal            Resulting Agency: [facility] ([facility])     Susceptibility     Escherichia coli     Not Specified    Ampicillin >=32 Resistant    Ampicillin + Sulbactam 8 Sensitive    Cefazolin <=4 Sensitive    Ciprofloxacin >=4 Resistant    Gentamicin <=1 Sensitive    Levofloxacin >=8 Resistant    Meropenem <=0.25 Sensitive    Nitrofurantoin <=16 Sensitive    Piperacillin + Tazobactam <=4 Sensitive    Trimethoprim/Sulfa 40 Sensitive              Linear View                 IMAGING REVIEW:  Narrative   PROCEDURE: CT ABDOMEN + PELVIS  (CONTRAST ONLY) (CPT=74177)     COMPARISON: [facility], CT ABDOMEN + PELVIS (CONTRAST ONLY) (CPT=74177), 11/07/2021, 8:02 AM.     INDICATIONS: Ventral hernia without obstruction or gangrene.     TECHNIQUE: CT images of the abdomen and pelvis were obtained with non-ionic intravenous contrast material.  Automated exposure control for dose reduction was used. Adjustment of the mA and/or kV was done based on the patient's size. Use of iterative  reconstruction technique for dose reduction was used.  Dose information is transmitted to the ACR (American College of Radiology) NRDR (National Radiology Data Registry) which includes the Dose Index Registry.     FINDINGS:  LIVER: A 4.9 cm hepatic dome cyst previously measured 4.3 cm.  Few additional smaller hepatic cysts.  No suspicious hepatic lesion.  A calcified hepatic granuloma.    BILIARY: Calcified gallstones.  No evidence for cholecystitis or biliary dilatation.  SPLEEN: Normal.    PANCREAS: Normal.    ADRENALS: Normal.  KIDNEYS: Right renal atrophy with cortical scarring.  No solid renal lesion or renal obstruction.  AORTA/VASCULAR: Atherosclerotic vascular calcification including coronary artery calcification.  No aneurysm or dissection.    LYMPHADENOPATHY: None.  GI/MESENTERY: Multiple uncomplicated duodenal diverticula including a 4 cm periampullary diverticulum.  Colonic diverticulosis.  Anastomotic staple line related to a previous distal colon resection.  Normal appendix.  No obstruction, bowel wall  thickening, or mesenteric mass.  ABDOMINAL WALL: Large ventral abdominal wall hernia with defect having a transverse dimension of about 13 cm containing multiple nonobstructed small bowel loops.  A small fat containing midline supraumbilical incisional hernia.  URINARY BLADDER: A stable approximately 3.3 cm broad-based right anterolateral bladder diverticulum.  No discrete mass.  ASCITES:   None.  PELVIC ORGANS: No suspect pelvic mass.  BONES: No  suspect bone lesion.  Grade 2 spondylolisthesis of L5 on S1 related to bilateral L5 pars defects.  LUNG BASES: Subsegmental atelectasis and or scarring in left lower lobe.  OTHER: Partly visualized pacemaker.               Impression   CONCLUSION:  1. Large ventral abdominal wall hernia containing multiple nonobstructed small bowel loops.  2. Small fat containing supraumbilical rupal-incisional abdominal wall hernia.           Dictated by (CST): Franky Bliss MD on 6/07/2024 at 1:58 PM      Finalized by (CST): Franky Bliss MD on 6/07/2024 at 2:08 PM              OTHER RELEVANT DATA:   none     IMPRESSION: 1.  Right hydroureteronephrosis not resolved after dilation of ureteral stricture with no residual hydronephrosis seen on repeat renal bladder ultrasound.  It is reassuring that her creatinine is at baseline.  She can follow-up with her primary care physician for basic lab testing on an annual basis. If she has symptoms we can consider repeat imaging in 1 year or sooner.     2.  Neurogenic bladder with recurrent urinary tract infections in patient who catheterizes 4 times a day.  I discussed catheterizing for volumes less than 300 cc and pursuing UTI prevention mechanisms as listed below.     We talked about UTI prevention with continuing good hydration, starting a women's probiotic (lactobacillus), Ellura pills (pamphlet provided), bowel regimen (colace, senna, miralax), voiding before and after sexual activity and using pH balanced soaps. Can continue to check urine and treat when UCx is positive. If this persists,  can consider initiating low dose antibiotic prophylaxis for 6 months versus gentamicin irrigations if she would like a more local therapy. Estrace cream provided.     For recurrent urinary tract infections I emphasized the importance of NOT checking urine unless truly symptomatic. She will also keep a cath'd diary to ensure volumes catheterized are <300cc.     PLAN:  RTC as needed  Urine cultures  only to be checked and treated if symptomatic -  3 standing orders placed  Cath'd diary to ensure <300cc  Estrace cream ordered    Thank you for referring this very pleasant patient to my clinic. If you have any questions or concerns, please do not hesitate to contact me.    Sincerely,  Nayeli Deleon MD    30 minutes were spent on this patient at this visit obtaining a history, reviewing medical records, developing a treatment plan, counseling and discussing treatment strategy with patient, coordination of care and documentation.     The 21st Century Cures Act makes medical notes available to patients in the interest of transparency.  However, please be advised that this is a medical document.  It is intended as a peer to peer communication.  It is written in medical language and may contain abbreviations or verbiage that are technical and unfamiliar.  It may appear blunt or direct.  Medical documents are intended to carry relevant information, facts as evident, and the clinical opinion of the practitioner.

## 2024-09-12 ENCOUNTER — OFFICE VISIT (OUTPATIENT)
Dept: INTERNAL MEDICINE CLINIC | Facility: CLINIC | Age: 74
End: 2024-09-12

## 2024-09-12 ENCOUNTER — TELEPHONE (OUTPATIENT)
Dept: INTERNAL MEDICINE CLINIC | Facility: CLINIC | Age: 74
End: 2024-09-12

## 2024-09-12 DIAGNOSIS — R25.1 TREMOR: Primary | ICD-10-CM

## 2024-09-12 DIAGNOSIS — I48.21 PERMANENT ATRIAL FIBRILLATION (HCC): Primary | ICD-10-CM

## 2024-09-12 NOTE — TELEPHONE ENCOUNTER
Patient called saying the neuro she was referred to says they are psychiatrists not neuro. Please provide with a neuro Doctor she is requesting.   Please call patient once put in, Thank you

## 2024-10-16 RX ORDER — SUMATRIPTAN 50 MG/1
50 TABLET, FILM COATED ORAL EVERY 2 HOUR PRN
Qty: 9 TABLET | Refills: 0 | Status: SHIPPED | OUTPATIENT
Start: 2024-10-16 | End: 2024-11-19

## 2024-11-12 ENCOUNTER — OFFICE VISIT (OUTPATIENT)
Dept: INTERNAL MEDICINE CLINIC | Facility: CLINIC | Age: 74
End: 2024-11-12
Payer: MEDICARE

## 2024-11-12 ENCOUNTER — LAB ENCOUNTER (OUTPATIENT)
Dept: LAB | Facility: HOSPITAL | Age: 74
End: 2024-11-12
Attending: INTERNAL MEDICINE
Payer: MEDICARE

## 2024-11-12 VITALS
DIASTOLIC BLOOD PRESSURE: 60 MMHG | TEMPERATURE: 98 F | SYSTOLIC BLOOD PRESSURE: 122 MMHG | BODY MASS INDEX: 28.83 KG/M2 | HEIGHT: 59 IN | OXYGEN SATURATION: 96 % | HEART RATE: 79 BPM | WEIGHT: 143 LBS

## 2024-11-12 DIAGNOSIS — R25.1 TREMOR: ICD-10-CM

## 2024-11-12 DIAGNOSIS — I70.0 AORTIC CALCIFICATION (HCC): ICD-10-CM

## 2024-11-12 DIAGNOSIS — E03.9 ACQUIRED HYPOTHYROIDISM: ICD-10-CM

## 2024-11-12 DIAGNOSIS — R10.31 RIGHT LOWER QUADRANT ABDOMINAL PAIN: Primary | ICD-10-CM

## 2024-11-12 DIAGNOSIS — E78.5 DYSLIPIDEMIA: ICD-10-CM

## 2024-11-12 LAB
CHOLEST SERPL-MCNC: 266 MG/DL (ref ?–200)
FASTING PATIENT LIPID ANSWER: YES
HDLC SERPL-MCNC: 52 MG/DL (ref 40–59)
LDLC SERPL CALC-MCNC: 193 MG/DL (ref ?–100)
NONHDLC SERPL-MCNC: 214 MG/DL (ref ?–130)
TRIGL SERPL-MCNC: 116 MG/DL (ref 30–149)
TSI SER-ACNC: 2.63 UIU/ML (ref 0.55–4.78)
VLDLC SERPL CALC-MCNC: 24 MG/DL (ref 0–30)

## 2024-11-12 PROCEDURE — 99214 OFFICE O/P EST MOD 30 MIN: CPT | Performed by: INTERNAL MEDICINE

## 2024-11-12 PROCEDURE — 36415 COLL VENOUS BLD VENIPUNCTURE: CPT

## 2024-11-12 PROCEDURE — 80061 LIPID PANEL: CPT

## 2024-11-12 PROCEDURE — G2211 COMPLEX E/M VISIT ADD ON: HCPCS | Performed by: INTERNAL MEDICINE

## 2024-11-12 PROCEDURE — 84443 ASSAY THYROID STIM HORMONE: CPT

## 2024-11-12 NOTE — PROGRESS NOTES
Please call and let know that baseline LDL is 190 which is extremely high.  Recommend to take a statin.

## 2024-11-12 NOTE — PROGRESS NOTES
Heidy Epstein female 74 year old         Chief Complaint   Patient presents with    Medication Follow-Up    Lab Results     Right  lower  abd pain  when tries to lie on it -denies any obstructive symptoms fever chills or severe pain other than when lying on it.    She is status post hernia surgery in July.       Since last  vist  stopped  atorvastatin -  on it for many years  -  discussed  pros and cons       Last  tsh elevated  -  was taking her levothyroxine with calcium      Last labs  LFT nl prior to that in early August alk phos was 173 ALT was 89.  Denies any abdominal symptoms other than the hernia area pain    No edema      No issues  with  xarelto     We gave  wrong  neuro consult ( was psych) for her tremor to be sure she does not have Parkinson's disease.    Aortic calcification- and coronary  calcification seen on imaging discussed       Patient Active Problem List   Diagnosis    Essential hypertension    Hypothyroidism    Ureteral stricture, right    Atrioventricular block, complete (HCC)    Dyslipidemia    Permanent atrial fibrillation (HCC)    Sick sinus syndrome (HCC)    Spinal stenosis of lumbar region    Bladder outlet obstruction    Spondylolisthesis of lumbosacral region    Hand cramps    Status post placement of cardiac pacemaker    History of urinary self-catheterization    Migraine with aura and without status migrainosus, not intractable    Post-op pain    Aneurysm of the ascending aorta, without rupture (HCC)          Medications Ordered Prior to Encounter[1]       Vitals:    11/12/24 0943   BP: 122/60   Pulse: 79   Temp: 98.1 °F (36.7 °C)   VITALSBody mass index is 28.88 kg/m².    Pertinent findings on the physical exam; abd  exam nl  scar tissue  firm mild tenderness.  No  hernia  felt  No significant tremor noted today.  No thyromegaly noted  Heidy was seen today for medication follow-up and lab results.    Diagnoses and all orders for this visit:    Right lower quadrant abdominal  pain    Dyslipidemia  -     Lipid Panel; Future    Acquired hypothyroidism  -     TSH W Reflex To Free T4; Future    Tremor    Aortic calcification (HCC)           No obvious issues with hernia -we did provocation and could not find a new hernia.  The area was slightly tender and consistent with the area of her pain.  I suspected scar tissue.    Discussed  calcification  and  statin -   check  baseline  chol  then start it-is aware of the benefits and risk    No heart  sx at the present time.  Will hold off on any imaging such as stress test.    Labs back to nl  -etiology of elevated LFTs is unclear.    Check tsh after has been on medication without calcium pills.    Blood pressure is controlled    Edema gone.    A-fib stable continue anticoagulation          This note was prepared using Dragon Medical voice recognition dictation software and as a result, errors may occur. When identified, these errors have been corrected. While every attempt is made to correct errors during dictation, discrepancies may still exist            [1]   Current Outpatient Medications on File Prior to Visit   Medication Sig Dispense Refill    SUMAtriptan 50 MG Oral Tab Take 1 tablet (50 mg total) by mouth every 2 (two) hours as needed for Migraine. 9 tablet 0    estradiol (ESTRACE) 0.1 MG/GM Vaginal Cream Apply fingertip amount of cream to the vagina (0.5-1g) every night for 1 week and then every other night indefinitely 42.5 g 11    LISINOPRIL-HYDROCHLOROTHIAZIDE 10-12.5 MG Oral Tab TAKE 1 TABLET BY MOUTH EVERY DAY 90 tablet 0    magnesium 250 MG Oral Tab Take 1 tablet (250 mg total) by mouth in the morning and 1 tablet (250 mg total) before bedtime.      Vitamin C 500 MG Oral Tab Take 2 tablets (1,000 mg total) by mouth in the morning and 2 tablets (1,000 mg total) before bedtime.      cholecalciferol 125 MCG (5000 UT) Oral Tab Take 1 tablet (5,000 Units total) by mouth daily.      Garlic 400 MG Oral Tab EC Take 1 tablet by mouth daily.       Cinnamon 500 MG Oral Cap Take 800 mg by mouth daily.      Probiotic Product (FORTIFY PROBIOTIC WOMENS OR) Take 1 tablet by mouth daily.      Calcium Polycarbophil (FIBER) 625 MG Oral Tab Take 1 tablet (625 mg total) by mouth at bedtime.      Cranberry 360 MG Oral Cap Take 1 capsule by mouth daily.      LEVOTHYROXINE 75 MCG Oral Tab TAKE 1 TABLET BY MOUTH EVERY DAY BEFORE BREAKFAST 90 tablet 3    Rivaroxaban 20 MG Oral Tab Take 1 tablet (20 mg total) by mouth every evening. 30 tablet 0    Echinacea-Goldenseal (ECHINACEA COMB/SAINI SEAL OR) Take by mouth daily.        Multiple Vitamins-Minerals (MULTIVITAMIN OR) Take 1 tablet by mouth nightly.         No current facility-administered medications on file prior to visit.

## 2024-11-14 ENCOUNTER — TELEPHONE (OUTPATIENT)
Dept: INTERNAL MEDICINE CLINIC | Facility: CLINIC | Age: 74
End: 2024-11-14

## 2024-11-14 RX ORDER — ATORVASTATIN CALCIUM 20 MG/1
20 TABLET, FILM COATED ORAL NIGHTLY
COMMUNITY

## 2024-11-14 NOTE — TELEPHONE ENCOUNTER
Called patient, CAMILLA verified, results below given, per patient she did start taking Atorvastatin 20mg again, medication was added to her medication list

## 2024-11-14 NOTE — TELEPHONE ENCOUNTER
----- Message from Eleazar Vaughan sent at 11/12/2024  5:52 PM CST -----  Please call and let know that baseline LDL is 190 which is extremely high.  Recommend to take a statin.

## 2024-11-19 RX ORDER — SUMATRIPTAN 50 MG/1
50 TABLET, FILM COATED ORAL EVERY 2 HOUR PRN
Qty: 9 TABLET | Refills: 0 | Status: SHIPPED | OUTPATIENT
Start: 2024-11-19

## 2024-12-02 RX ORDER — LISINOPRIL AND HYDROCHLOROTHIAZIDE 10; 12.5 MG/1; MG/1
1 TABLET ORAL DAILY
Qty: 90 TABLET | Refills: 0 | Status: SHIPPED | OUTPATIENT
Start: 2024-12-02

## 2024-12-24 RX ORDER — ATORVASTATIN CALCIUM 20 MG/1
20 TABLET, FILM COATED ORAL NIGHTLY
Qty: 90 TABLET | Refills: 1 | Status: SHIPPED | OUTPATIENT
Start: 2024-12-24

## 2025-01-09 ENCOUNTER — OFFICE VISIT (OUTPATIENT)
Dept: INTERNAL MEDICINE CLINIC | Facility: CLINIC | Age: 75
End: 2025-01-09
Payer: MEDICARE

## 2025-01-09 VITALS
SYSTOLIC BLOOD PRESSURE: 122 MMHG | BODY MASS INDEX: 28.83 KG/M2 | DIASTOLIC BLOOD PRESSURE: 74 MMHG | OXYGEN SATURATION: 97 % | RESPIRATION RATE: 18 BRPM | HEART RATE: 105 BPM | HEIGHT: 59 IN | WEIGHT: 143 LBS

## 2025-01-09 DIAGNOSIS — I10 ESSENTIAL HYPERTENSION: ICD-10-CM

## 2025-01-09 DIAGNOSIS — I49.5 SICK SINUS SYNDROME (HCC): ICD-10-CM

## 2025-01-09 DIAGNOSIS — Z95.0 STATUS POST PLACEMENT OF CARDIAC PACEMAKER: ICD-10-CM

## 2025-01-09 DIAGNOSIS — E78.5 DYSLIPIDEMIA: Primary | ICD-10-CM

## 2025-01-09 DIAGNOSIS — M48.062 SPINAL STENOSIS OF LUMBAR REGION WITH NEUROGENIC CLAUDICATION: ICD-10-CM

## 2025-01-09 DIAGNOSIS — I70.0 AORTIC CALCIFICATION (HCC): ICD-10-CM

## 2025-01-09 DIAGNOSIS — E03.9 ACQUIRED HYPOTHYROIDISM: ICD-10-CM

## 2025-01-09 DIAGNOSIS — N32.0 BLADDER OUTLET OBSTRUCTION: ICD-10-CM

## 2025-01-09 DIAGNOSIS — I48.21 PERMANENT ATRIAL FIBRILLATION (HCC): ICD-10-CM

## 2025-01-09 DIAGNOSIS — G43.109 MIGRAINE WITH AURA AND WITHOUT STATUS MIGRAINOSUS, NOT INTRACTABLE: ICD-10-CM

## 2025-01-09 DIAGNOSIS — I71.21 ANEURYSM OF THE ASCENDING AORTA, WITHOUT RUPTURE (HCC): ICD-10-CM

## 2025-01-09 PROCEDURE — G0439 PPPS, SUBSEQ VISIT: HCPCS | Performed by: INTERNAL MEDICINE

## 2025-01-09 RX ORDER — ATORVASTATIN CALCIUM 20 MG/1
10 TABLET, FILM COATED ORAL NIGHTLY
COMMUNITY
Start: 2025-01-09

## 2025-01-09 NOTE — PROGRESS NOTES
Heidy Epstein is a 74 year old  who presents for a Medicare Subsequent Annual Wellness visit (Pt already had Initial Annual Wellness)    Discussed medicare wellness , reviewed assessments and health maintenance for screening and immunizations.  We reviewed and assessed medical problems and medications    In addition to the wellness visit addressed   high chol  back on statin     Hair falling out      No thyroid symptoms.  No symptoms to suggest aneurysm dissection.  She has atrial fibrillation and pacemaker.    Continues to do self-catheterization.    Chronic back pain but tolerates it.  No new symptoms    Still has occasional migraines    Is on anticoagulation for her atrial fibrillation no known bleeding    Blood pressure doing well      Allergies:   is allergic to levaquin [levofloxacin] and vancomycin.  Medical History:    has a past medical history of A-fib (), Abnormal cholesterol test (), Allergic rhinitis (spring & ), Arrhythmia, Arthritis, Back problem, Cataract, Disorder of thyroid, Essential hypertension (20-25 years ago), H/O complete eye exam (), H/O diagnostic mammography (,), H/O exercise stress test (), High blood pressure, High cholesterol, HTN (hypertension), Hyperlipidemia, Hyperlipidemia, Hypothyroidism, Migraines, Neurogenic bladder, Pacemaker, Pulmonary embolism (HCC), Self-catheterizes urinary bladder, and Visual impairment.  Surgical History:    has a past surgical history that includes pacemaker; carpal tunnel release (Right); other; colonoscopy (); colonoscopy (N/A, 2020); other (Right); other (Right); tonsillectomy; appendectomy (2023); cataract (); d & c (); hernia surgery ();  (1971); and other surgical history (1995).   Family History:   family history includes Breast Cancer (age of onset: 28) in an other family member; Cancer in her father and mother; Heart Disease in her father and mother;  Heart Disorder in her father and mother; Hypertension in her father; Lipids in her father; Musculo-skelatal Disorder in her mother.  Social History:    reports that she has never smoked. She has never used smokeless tobacco. She reports that she does not currently use alcohol. She reports that she does not use drugs.        Fall Risk Assessment:   She has been screened for Falls and is low risk.      Cognitive Assessment:   She had a completely normal cognitive assessment - see flowsheet entries     Functional Ability/Status:   Heidy Epstein has a completely normal functional assessment. See flowsheet for details.        Depression Screening (PHQ-2/PHQ-9): PHQ-2 SCORE: 0  , done 1/8/2025          Advanced Directives:   discussed end of life issues   quality of life is most important   no heroic measures  family aware of wishes and would act on behalf     Tobacco:  She has never smoked tobacco.    CAGE Alcohol Screen:   CAGE screening score of 0 on 1/8/2025, showing low risk of alcohol abuse.          Patient Care Team:  Eleazar Vaughan MD as PCP - Tita Denson PT as Physical Therapist (Physical Therapy)  Dorothea Santiago PTA as Physical Therapist (Physical Therapy)  Remy Lorenzo MD (Cardiac Electrophysiology)  Cari Gale MD (SURGERY, ORTHOPEDIC)          Objective:   /74   Pulse 105   Resp 18   Ht 4' 11\" (1.499 m)   Wt 143 lb (64.9 kg)   SpO2 97%   BMI 28.88 kg/m²    Estimated body mass index is 28.88 kg/m² as calculated from the following:    Height as of this encounter: 4' 11\" (1.499 m).    Weight as of this encounter: 143 lb (64.9 kg).  Looks healthy   neuro   and  cognition are nl   neck exam nl   Cor reg   lungs clear   abd nl   ext nl     Medicare Hearing Assessment:  Hearing Screening    Time taken: 1/9/2025  4:11 PM  Entry User: Eleazar Vaughan MD  Screening Method: Finger Rub  Finger Rub Result: Pass       Visual Acuity:         Current Outpatient Medications:     atorvastatin  20 MG Oral Tab, Take 0.5 tablets (10 mg total) by mouth nightly., Disp: , Rfl:     LISINOPRIL-HYDROCHLOROTHIAZIDE 10-12.5 MG Oral Tab, TAKE 1 TABLET BY MOUTH EVERY DAY, Disp: 90 tablet, Rfl: 0    SUMATRIPTAN 50 MG Oral Tab, TAKE 1 TABLET BY MOUTH EVERY 2 HOURS AS NEEDED FOR MIGRAINE., Disp: 9 tablet, Rfl: 0    estradiol (ESTRACE) 0.1 MG/GM Vaginal Cream, Apply fingertip amount of cream to the vagina (0.5-1g) every night for 1 week and then every other night indefinitely, Disp: 42.5 g, Rfl: 11    magnesium 250 MG Oral Tab, Take 1 tablet (250 mg total) by mouth in the morning and 1 tablet (250 mg total) before bedtime., Disp: , Rfl:     Vitamin C 500 MG Oral Tab, Take 2 tablets (1,000 mg total) by mouth in the morning and 2 tablets (1,000 mg total) before bedtime., Disp: , Rfl:     cholecalciferol 125 MCG (5000 UT) Oral Tab, Take 1 tablet (5,000 Units total) by mouth daily., Disp: , Rfl:     Garlic 400 MG Oral Tab EC, Take 1 tablet by mouth daily., Disp: , Rfl:     Cinnamon 500 MG Oral Cap, Take 800 mg by mouth daily., Disp: , Rfl:     Probiotic Product (FORTIFY PROBIOTIC WOMENS OR), Take 1 tablet by mouth daily., Disp: , Rfl:     Calcium Polycarbophil (FIBER) 625 MG Oral Tab, Take 1 tablet (625 mg total) by mouth at bedtime., Disp: , Rfl:     Cranberry 360 MG Oral Cap, Take 1 capsule by mouth daily., Disp: , Rfl:     LEVOTHYROXINE 75 MCG Oral Tab, TAKE 1 TABLET BY MOUTH EVERY DAY BEFORE BREAKFAST, Disp: 90 tablet, Rfl: 3    Rivaroxaban 20 MG Oral Tab, Take 1 tablet (20 mg total) by mouth every evening., Disp: 30 tablet, Rfl: 0    Echinacea-Goldenseal (ECHINACEA COMB/SAINI SEAL OR), Take by mouth daily.  , Disp: , Rfl:     Multiple Vitamins-Minerals (MULTIVITAMIN OR), Take 1 tablet by mouth nightly.  , Disp: , Rfl:      ASSESSMENT  and   PLAN    Medicare wellness  Dicussed the screening assessments, disease prevention and screening  and immunization for age-   Recommend  healthy diet, lifestyle, and exercise.  Discussed current state of health.    Diagnoses and all orders for this visit:    Dyslipidemia-we discussed her current medication.  She has had some hair loss so we will cut her dose from 20-10.  I am not sure if it is related but we will see what happens recently we are going to stop it but her LDL came back quite high.    Acquired hypothyroidism-clinically and chemically euthyroid continue present medication    Aortic calcification (HCC)-has no symptoms to suggest dissection.  No claudication to suggest PAD    Permanent atrial fibrillation (HCC)-currentl on Xarelto for anticoagulation.  Is in sinus.  Has pacemaker.  Follows with cardiology    Essential hypertension-blood pressure well-controlled continue lisinopril hydrochlorothiazide.    Sick sinus syndrome (HCC)-no significant symptoms.  Follows with cardiology    Status post placement of cardiac pacemaker    Bladder outlet obstruction-self-catheterization.    Migraine with aura and without status migrainosus, not intractable-uses sumatriptan as needed.    Spinal stenosis of lumbar region with neurogenic claudication-has pain but lives with it.  Last imaging showed significant arthritis    Kidney function is normal no signs of CKD.  Previous notes suggested aortic aneurysm I could not find any proof.  Recent echo did not suggest any abnormalities has not had any recent chest imaging.               No follow-ups on file.     Eleazar Vaughan MD      General Health:  In the past six months, have you lost more than 10 pounds without trying?: (Patient-Rptd) 2 - No  Has your appetite been poor?: (Patient-Rptd) Yes  Type of Diet: (Patient-Rptd) Balanced  How does the patient maintain a good energy level?: (Patient-Rptd) Other  How would you describe your daily physical activity?: (Patient-Rptd) Moderate  How would you describe your current health state?: (Patient-Rptd) Good  How do you maintain positive mental well-being?: (Patient-Rptd) Social  Interaction;Games;Visiting Friends;Visiting Family  On a scale of 0 to 10, with 0 being no pain and 10 being severe pain, what is your pain level?: (Patient-Rptd) 2 - (Mild)  In the past six months, have you experienced urine leakage?: (Patient-Rptd) 0-No  At any time do you feel concerned for the safety/well-being of yourself and/or your children, in your home or elsewhere?: (Patient-Rptd) No  Have you had any immunizations at another office such as Influenza, Hepatitis B, Tetanus, or Pneumococcal?: (Patient-Rptd) Yes       Heidy Epstein's SCREENING SCHEDULE   Tests on this list are recommended by your physician but may not be covered, or covered at this frequency, by your insurer.   Please check with your insurance carrier before scheduling to verify coverage.   PREVENTATIVE SERVICES FREQUENCY &  COVERAGE DETAILS LAST COMPLETION DATE   Diabetes Screening    Fasting Blood Sugar /  Glucose    One screening every 12 months if never tested or if previously tested but not diagnosed with pre-diabetes   One screening every 6 months if diagnosed with pre-diabetes Lab Results   Component Value Date    GLU 94 08/22/2024        Cardiovascular Disease Screening    Lipid Panel  Cholesterol  Lipoprotein (HDL)  Triglycerides Covered every 5 years for all Medicare beneficiaries without apparent signs or symptoms of cardiovascular disease Lab Results   Component Value Date    CHOLEST 266 (H) 11/12/2024    HDL 52 11/12/2024     (H) 11/12/2024    TRIG 116 11/12/2024         Electrocardiogram (EKG)   Covered if needed at Welcome to Medicare, and non-screening if indicated for medical reasons 08/07/2024      Ultrasound Screening for Abdominal Aortic Aneurysm (AAA) Covered once in a lifetime for one of the following risk factors    Men who are 65-75 years old and have ever smoked    Anyone with a family history -     Colorectal Cancer Screening  Covered for ages 50-85; only need ONE of the following:    Colonoscopy   Covered  every 10 years    Covered every 2 years if patient is at high risk or previous colonoscopy was abnormal 08/13/2020    Health Maintenance   Topic Date Due    Colorectal Cancer Screening  08/13/2025       Flexible Sigmoidoscopy   Covered every 4 years -    Fecal Occult Blood Test Covered annually -   Bone Density Screening    Bone density screening    Covered every 2 years after age 65 if diagnosed with risk of osteoporosis or estrogen deficiency.    Covered yearly for long-term glucocorticoid medication use (Steroids) Last Dexa Scan:    XR DEXA BONE DENSITOMETRY (CPT=77080) 11/26/2018      No recommendations at this time   Pap and Pelvic    Pap   Covered every 2 years for women at normal risk; Annually if at high risk -  No recommendations at this time    Chlamydia Annually if high risk -  No recommendations at this time   Screening Mammogram    Mammogram     Recommend annually for all female patients aged 40 and older    One baseline mammogram covered for patients aged 35-39 08/07/2023    Health Maintenance   Topic Date Due    Mammogram  Discontinued       Immunizations    Influenza Covered once per flu season  Please get every year 10/16/2024  No recommendations at this time    Pneumococcal Each vaccine ([id number] & [id number]) covered once after 65 Prevnar 13: 10/01/2019    [id number]: -     No recommendations at this time    Hepatitis B One screening covered for patients with certain risk factors   -  No recommendations at this time    Tetanus Toxoid Not covered by Medicare Part B unless medically necessary (cut with metal); may be covered with your pharmacy prescription benefits -    Tetanus, Diptheria and Pertusis TD and TDaP Not covered by Medicare Part B -  No recommendations at this time    Zoster Not covered by Medicare Part B; may be covered with your pharmacy  prescription benefits 11/09/2015  Zoster Vaccines(2 of 3) due on 01/04/2016     Annual Monitoring of Persistent Medications (ACE/ARB, digoxin  diuretics, anticonvulsants)    Potassium Annually Lab Results   Component Value Date    K 4.1 08/22/2024         Creatinine   Annually Lab Results   Component Value Date    CREATSERUM 0.92 08/22/2024         BUN Annually Lab Results   Component Value Date    BUN 11 08/22/2024       Drug Serum Conc Annually No results found for: \"DIGOXIN\", \"DIG\", \"VALP\"

## 2025-02-10 NOTE — TELEPHONE ENCOUNTER
Rapid Response Note  Nate Prado 82 y.o. male MRN: [id number]  Unit/Bed#: -01 Encounter: [id number]    Medsur  Rapid response location:  [facility] Scheduled for:  Colonoscopy 78990  Provider Name:  Belkis Nelson  Date:  8/13/2020  Location:  [facility]  Sedation:  Mac  Time:  0830 Am (pt is aware to arrive at 0730 am)  Prep:  Prep instructions were given to pt in the office, pt verbalized understanding.   Meds/Al GABRIEL X2

## 2025-02-27 RX ORDER — LISINOPRIL AND HYDROCHLOROTHIAZIDE 10; 12.5 MG/1; MG/1
1 TABLET ORAL DAILY
Qty: 90 TABLET | Refills: 1 | Status: SHIPPED | OUTPATIENT
Start: 2025-02-27

## 2025-04-01 RX ORDER — SUMATRIPTAN 50 MG/1
50 TABLET, FILM COATED ORAL EVERY 2 HOUR PRN
Qty: 9 TABLET | Refills: 0 | Status: SHIPPED | OUTPATIENT
Start: 2025-04-01

## 2025-04-30 ENCOUNTER — LAB ENCOUNTER (OUTPATIENT)
Dept: LAB | Age: 75
End: 2025-04-30
Attending: INTERNAL MEDICINE
Payer: MEDICARE

## 2025-04-30 ENCOUNTER — OFFICE VISIT (OUTPATIENT)
Age: 75
End: 2025-04-30
Payer: MEDICARE

## 2025-04-30 VITALS
SYSTOLIC BLOOD PRESSURE: 128 MMHG | BODY MASS INDEX: 29.43 KG/M2 | WEIGHT: 146 LBS | HEART RATE: 86 BPM | HEIGHT: 59 IN | DIASTOLIC BLOOD PRESSURE: 78 MMHG | OXYGEN SATURATION: 97 %

## 2025-04-30 DIAGNOSIS — N32.0 BLADDER OUTLET OBSTRUCTION: ICD-10-CM

## 2025-04-30 DIAGNOSIS — R10.13 DYSPEPSIA: ICD-10-CM

## 2025-04-30 DIAGNOSIS — E78.5 DYSLIPIDEMIA: ICD-10-CM

## 2025-04-30 DIAGNOSIS — R53.83 OTHER FATIGUE: ICD-10-CM

## 2025-04-30 DIAGNOSIS — Z95.0 PACEMAKER: ICD-10-CM

## 2025-04-30 DIAGNOSIS — R10.31 RIGHT LOWER QUADRANT ABDOMINAL PAIN: ICD-10-CM

## 2025-04-30 DIAGNOSIS — R06.09 DYSPNEA ON EXERTION: Primary | ICD-10-CM

## 2025-04-30 DIAGNOSIS — R06.09 DYSPNEA ON EXERTION: ICD-10-CM

## 2025-04-30 DIAGNOSIS — D18.01 CHERRY ANGIOMA: ICD-10-CM

## 2025-04-30 DIAGNOSIS — I10 ESSENTIAL HYPERTENSION: ICD-10-CM

## 2025-04-30 DIAGNOSIS — I48.21 PERMANENT ATRIAL FIBRILLATION (HCC): ICD-10-CM

## 2025-04-30 DIAGNOSIS — E03.9 ACQUIRED HYPOTHYROIDISM: ICD-10-CM

## 2025-04-30 LAB
ALBUMIN SERPL-MCNC: 4.9 G/DL (ref 3.2–4.8)
ALBUMIN/GLOB SERPL: 1.8 {RATIO} (ref 1–2)
ALP LIVER SERPL-CCNC: 81 U/L (ref 55–142)
ALT SERPL-CCNC: 14 U/L (ref 10–49)
ANION GAP SERPL CALC-SCNC: 10 MMOL/L (ref 0–18)
AST SERPL-CCNC: 21 U/L (ref ?–34)
BASOPHILS # BLD AUTO: 0.03 X10(3) UL (ref 0–0.2)
BASOPHILS NFR BLD AUTO: 0.6 %
BILIRUB SERPL-MCNC: 0.6 MG/DL (ref 0.2–1.1)
BUN BLD-MCNC: 15 MG/DL (ref 9–23)
BUN/CREAT SERPL: 15.8 (ref 10–20)
CALCIUM BLD-MCNC: 9.6 MG/DL (ref 8.7–10.4)
CHLORIDE SERPL-SCNC: 104 MMOL/L (ref 98–112)
CHOLEST SERPL-MCNC: 205 MG/DL (ref ?–200)
CO2 SERPL-SCNC: 28 MMOL/L (ref 21–32)
CREAT BLD-MCNC: 0.95 MG/DL (ref 0.55–1.02)
DEPRECATED RDW RBC AUTO: 42.9 FL (ref 35.1–46.3)
EGFRCR SERPLBLD CKD-EPI 2021: 62 ML/MIN/1.73M2 (ref 60–?)
EOSINOPHIL # BLD AUTO: 0.07 X10(3) UL (ref 0–0.7)
EOSINOPHIL NFR BLD AUTO: 1.4 %
ERYTHROCYTE [DISTWIDTH] IN BLOOD BY AUTOMATED COUNT: 13.6 % (ref 11–15)
FASTING PATIENT LIPID ANSWER: YES
FASTING STATUS PATIENT QL REPORTED: YES
GLOBULIN PLAS-MCNC: 2.7 G/DL (ref 2–3.5)
GLUCOSE BLD-MCNC: 91 MG/DL (ref 70–99)
HCT VFR BLD AUTO: 43.8 % (ref 35–48)
HDLC SERPL-MCNC: 63 MG/DL (ref 40–59)
HGB BLD-MCNC: 14.1 G/DL (ref 12–16)
IMM GRANULOCYTES # BLD AUTO: 0.01 X10(3) UL (ref 0–1)
IMM GRANULOCYTES NFR BLD: 0.2 %
LDLC SERPL CALC-MCNC: 125 MG/DL (ref ?–100)
LYMPHOCYTES # BLD AUTO: 1.12 X10(3) UL (ref 1–4)
LYMPHOCYTES NFR BLD AUTO: 22.1 %
MCH RBC QN AUTO: 27.9 PG (ref 26–34)
MCHC RBC AUTO-ENTMCNC: 32.2 G/DL (ref 31–37)
MCV RBC AUTO: 86.7 FL (ref 80–100)
MONOCYTES # BLD AUTO: 0.41 X10(3) UL (ref 0.1–1)
MONOCYTES NFR BLD AUTO: 8.1 %
NEUTROPHILS # BLD AUTO: 3.43 X10 (3) UL (ref 1.5–7.7)
NEUTROPHILS # BLD AUTO: 3.43 X10(3) UL (ref 1.5–7.7)
NEUTROPHILS NFR BLD AUTO: 67.6 %
NONHDLC SERPL-MCNC: 142 MG/DL (ref ?–130)
OSMOLALITY SERPL CALC.SUM OF ELEC: 294 MOSM/KG (ref 275–295)
PLATELET # BLD AUTO: 218 10(3)UL (ref 150–450)
POTASSIUM SERPL-SCNC: 3.8 MMOL/L (ref 3.5–5.1)
PROT SERPL-MCNC: 7.6 G/DL (ref 5.7–8.2)
RBC # BLD AUTO: 5.05 X10(6)UL (ref 3.8–5.3)
SODIUM SERPL-SCNC: 142 MMOL/L (ref 136–145)
TRIGL SERPL-MCNC: 97 MG/DL (ref 30–149)
TSI SER-ACNC: 2.74 UIU/ML (ref 0.55–4.78)
VLDLC SERPL CALC-MCNC: 17 MG/DL (ref 0–30)
WBC # BLD AUTO: 5.1 X10(3) UL (ref 4–11)

## 2025-04-30 PROCEDURE — G2211 COMPLEX E/M VISIT ADD ON: HCPCS | Performed by: INTERNAL MEDICINE

## 2025-04-30 PROCEDURE — 99214 OFFICE O/P EST MOD 30 MIN: CPT | Performed by: INTERNAL MEDICINE

## 2025-04-30 PROCEDURE — 80061 LIPID PANEL: CPT

## 2025-04-30 PROCEDURE — 84443 ASSAY THYROID STIM HORMONE: CPT

## 2025-04-30 PROCEDURE — 36415 COLL VENOUS BLD VENIPUNCTURE: CPT

## 2025-04-30 PROCEDURE — 85025 COMPLETE CBC W/AUTO DIFF WBC: CPT

## 2025-04-30 PROCEDURE — 80053 COMPREHEN METABOLIC PANEL: CPT

## 2025-04-30 NOTE — PROGRESS NOTES
History of Present Illness  Heidy Epstein is a 74 year old female who presents with persistent right lower quadrant pain.    She experiences persistent right lower quadrant pain that began after her hernia repair. The pain is constant but not severe, exacerbated by walking, and not tender to touch. It is not reproducible by twisting, turning, or pushing down. The pain sometimes occurs before bowel movements and lasts about 15 to 20 minutes, occurring a couple of times a week.    She continues to catheterize for bladder management and reports frequent bowel movements, occurring almost every time she uses the bathroom. She has experienced a recurrence of hemorrhoids, which sometimes bleed significantly. The bleeding is intermittent, with periods of no bleeding followed by sudden episodes.    She has noticed a new cherry angioma, which she describes as similar to a blood blister. Her daughter has also developed similar lesions, which bleed when punctured.    She experiences significant fatigue after meals, regardless of the type of food consumed. This fatigue began while she was in [place] and occurs after breakfast, lunch, or dinner. She sometimes naps due to the fatigue, which she describes as 'instant fatigue'.    She reports experiencing shortness of breath, particularly when climbing stairs or hills. She has a history of atrial fibrillation and has a pacemaker in place. No significant concern with atrial fibrillation.             Patient Active Problem List    Diagnosis Date Noted    Aneurysm of the ascending aorta, without rupture 08/12/2024    Post-op pain 07/23/2024    Migraine with aura and without status migrainosus, not intractable 11/01/2023    Status post placement of cardiac pacemaker 01/04/2023    History of urinary self-catheterization 01/04/2023    Hand cramps 10/04/2022    Bladder outlet obstruction 10/03/2022    Spondylolisthesis of lumbosacral region 10/03/2022    Ureteral stricture, right      Dyslipidemia 12/19/2019    Sick sinus syndrome (HCC) 12/19/2019    Essential hypertension 01/10/2014    Permanent atrial fibrillation (HCC) 08/10/2011    Hypothyroidism 01/05/2011    Spinal stenosis of lumbar region 11/30/2009    Atrioventricular block, complete (HCC) 06/28/2007        Medications - Current[1]     Vitals:    04/30/25 0935   BP: 128/78   Pulse: 86   VITALSBody mass index is 29.49 kg/m².      General: Healthy looking    Cognition oriented and alert    Neck: No JVD noted    Lungs: Clear to ascultation , no rales    Heart: Regular , S1, S2 nl , no murmur    Abdomen: Nondistended  non tender      Extremities: No  edema noted bilat    Skin: cherry angiomas     Neurological: No focal deficit , no tremor               Heidy was seen today for follow - up.    Diagnoses and all orders for this visit:    Dyspnea on exertion  -     CARD NUC EXERCISE STRESS (REST/EXER) (CPT 76028); Future  -     CBC With Differential With Platelet; Future    Cherry angioma    Right lower quadrant abdominal pain  -     CBC With Differential With Platelet; Future    Permanent atrial fibrillation (HCC)    Essential hypertension  -     Comp Metabolic Panel (14); Future    Bladder outlet obstruction    Other fatigue  -     CARD NUC EXERCISE STRESS (REST/EXER) (CPT 25563); Future  -     Comp Metabolic Panel (14); Future    Pacemaker  -     CARD NUC EXERCISE STRESS (REST/EXER) (CPT 79641); Future    Dyspepsia    Dyslipidemia  -     Lipid Panel; Future    Acquired hypothyroidism  -     TSH W Reflex To Free T4; Future       Assessment & Plan  Exertional shortness of breath  Intermittent exertional dyspnea, particularly when climbing stairs or hills. Differential includes cardiac issues, considering pacemaker and atrial fibrillation. Cardiac ischemia or deconditioning are also considered.  - Order nuclear stress test to evaluate for cardiac ischemia due to pacemaker interference with regular EKG stress test.    Postprandial  fatigue  Significant fatigue following meals, regardless of meal content. Differential includes postprandial hypotension, hypoglycemia, or cardiac issues. Discussed potential for hypoglycemia and cardiac causes.  - Instruct her to consume sugar (e.g., candy bar or jelly beans) when fatigue occurs post-meal to assess for hypoglycemia.  - Order nuclear stress test to evaluate for cardiac causes.    Right lower quadrant pain post-hernia repair  Intermittent right lower quadrant pain post-hernia repair, not severe but bothersome. Pain occurs with walking and sometimes before bowel movements. Differential includes muscular pain from hernia repair, unlikely to be diverticulitis or other acute abdominal pathology given previous CT scan results.    Bladder outlet obstruction  Continues to self-catheterize for bladder outlet obstruction. No new symptoms reported.    Recurrent hemorrhoids  Recurrent hemorrhoids with intermittent bleeding, likely exacerbated by frequent bowel movements. No current severe symptoms reported.    Cherry angioma  Presence of cherry angioma, not bothersome. These are benign and common with aging, not contagious. New lesions are likely to develop and can be lasered if bothersome.             This note was prepared using Dragon Medical voice recognition dictation software and as a result, errors may occur. When identified, these errors have been corrected. While every attempt is made to correct errors during dictation, discrepancies may still exist            [1]   Current Outpatient Medications:     SUMATRIPTAN 50 MG Oral Tab, TAKE 1 TABLET BY MOUTH EVERY 2 HOURS AS NEEDED FOR MIGRAINE., Disp: 9 tablet, Rfl: 0    lisinopril-hydroCHLOROthiazide 10-12.5 MG Oral Tab, Take 1 tablet by mouth daily., Disp: 90 tablet, Rfl: 1    atorvastatin 20 MG Oral Tab, Take 0.5 tablets (10 mg total) by mouth nightly., Disp: , Rfl:     estradiol (ESTRACE) 0.1 MG/GM Vaginal Cream, Apply fingertip amount of cream to the  vagina (0.5-1g) every night for 1 week and then every other night indefinitely, Disp: 42.5 g, Rfl: 11    magnesium 250 MG Oral Tab, Take 1 tablet (250 mg total) by mouth in the morning and 1 tablet (250 mg total) before bedtime., Disp: , Rfl:     Vitamin C 500 MG Oral Tab, Take 2 tablets (1,000 mg total) by mouth in the morning and 2 tablets (1,000 mg total) before bedtime., Disp: , Rfl:     cholecalciferol 125 MCG (5000 UT) Oral Tab, Take 1 tablet (5,000 Units total) by mouth daily., Disp: , Rfl:     Garlic 400 MG Oral Tab EC, Take 1 tablet by mouth daily., Disp: , Rfl:     Cinnamon 500 MG Oral Cap, Take 800 mg by mouth daily., Disp: , Rfl:     Probiotic Product (FORTIFY PROBIOTIC WOMENS OR), Take 1 tablet by mouth daily., Disp: , Rfl:     Calcium Polycarbophil (FIBER) 625 MG Oral Tab, Take 1 tablet (625 mg total) by mouth at bedtime., Disp: , Rfl:     Cranberry 360 MG Oral Cap, Take 1 capsule by mouth daily., Disp: , Rfl:     LEVOTHYROXINE 75 MCG Oral Tab, TAKE 1 TABLET BY MOUTH EVERY DAY BEFORE BREAKFAST, Disp: 90 tablet, Rfl: 3    Rivaroxaban 20 MG Oral Tab, Take 1 tablet (20 mg total) by mouth every evening., Disp: 30 tablet, Rfl: 0    Echinacea-Goldenseal (ECHINACEA COMB/SAINI SEAL OR), Take by mouth daily.  , Disp: , Rfl:     Multiple Vitamins-Minerals (MULTIVITAMIN OR), Take 1 tablet by mouth nightly.  , Disp: , Rfl:

## 2025-04-30 NOTE — PROGRESS NOTES
The following individual(s) verbally consented to be recorded using ambient AI listening technology and understand that they can each withdraw their consent to this listening technology at any point by asking the clinician to turn off or pause the recording: YES    Patient name: Heidy CARDOSO Tete  Additional names:

## 2025-05-02 ENCOUNTER — TELEPHONE (OUTPATIENT)
Age: 75
End: 2025-05-02

## 2025-05-02 NOTE — TELEPHONE ENCOUNTER
----- Message from Eleazar Vaughan sent at 5/1/2025  8:23 AM CDT -----  Please call and let know that labs look good including cholesterol which is much better  ----- Message -----  From: Lab, Background User  Sent: 4/30/2025   3:43 PM CDT  To: Eleazar Vaughan MD

## 2025-05-07 ENCOUNTER — TELEPHONE (OUTPATIENT)
Dept: SURGERY | Facility: CLINIC | Age: 75
End: 2025-05-07

## 2025-05-07 NOTE — TELEPHONE ENCOUNTER
Received a fax from [address] [place] [address], need signature from Dr. Deleon for orders.  Form placed on Sr. Zhou desk awaiting signature.

## 2025-05-12 ENCOUNTER — HOSPITAL ENCOUNTER (OUTPATIENT)
Dept: NUCLEAR MEDICINE | Facility: HOSPITAL | Age: 75
Discharge: HOME | End: 2025-05-12
Attending: INTERNAL MEDICINE
Payer: MEDICARE

## 2025-05-12 ENCOUNTER — HOSPITAL ENCOUNTER (OUTPATIENT)
Dept: CV DIAGNOSTICS | Facility: HOSPITAL | Age: 75
Discharge: HOME | End: 2025-05-12
Attending: INTERNAL MEDICINE
Payer: MEDICARE

## 2025-05-12 DIAGNOSIS — R53.83 OTHER FATIGUE: ICD-10-CM

## 2025-05-12 DIAGNOSIS — R06.09 DYSPNEA ON EXERTION: ICD-10-CM

## 2025-05-12 DIAGNOSIS — Z95.0 PACEMAKER: ICD-10-CM

## 2025-05-12 PROCEDURE — 93018 CV STRESS TEST I&R ONLY: CPT | Performed by: INTERNAL MEDICINE

## 2025-05-12 PROCEDURE — 78452 HT MUSCLE IMAGE SPECT MULT: CPT | Performed by: INTERNAL MEDICINE

## 2025-05-12 PROCEDURE — 93017 CV STRESS TEST TRACING ONLY: CPT | Performed by: INTERNAL MEDICINE

## 2025-05-12 PROCEDURE — 93016 CV STRESS TEST SUPVJ ONLY: CPT | Performed by: INTERNAL MEDICINE

## 2025-05-12 RX ORDER — REGADENOSON 0.08 MG/ML
INJECTION, SOLUTION INTRAVENOUS
Status: COMPLETED
Start: 2025-05-12 | End: 2025-05-12

## 2025-05-12 RX ADMIN — REGADENOSON 0.4 MG: 0.08 INJECTION, SOLUTION INTRAVENOUS at 09:26:00

## 2025-05-13 LAB
% OF MAX PREDICTED HR: 100 %
MAX DIASTOLIC BP: 67 MMHG
MAX HEART RATE: 63 BPM
MAX PREDICTED HEART RATE: 144 BPM
MAX SYSTOLIC BP: 146 MMHG
MAX WORK LOAD: 10

## 2025-05-14 ENCOUNTER — TELEPHONE (OUTPATIENT)
Age: 75
End: 2025-05-14

## 2025-05-14 NOTE — TELEPHONE ENCOUNTER
----- Message from Eleazar Vaughan sent at 5/13/2025 10:23 AM CDT -----  Great news  stress test  normal   ----- Message -----  From: Sol[facility] Harkins [facility]  Sent: 5/13/2025   9:49 AM CDT  To: Eleazar Vaughan MD

## 2025-07-02 RX ORDER — LEVOTHYROXINE SODIUM 75 UG/1
TABLET ORAL
Qty: 90 TABLET | Refills: 3 | Status: SHIPPED | OUTPATIENT
Start: 2025-07-02

## 2025-07-03 NOTE — TELEPHONE ENCOUNTER
Refill Per Protocol     Requested Prescriptions   Pending Prescriptions Disp Refills    levothyroxine 75 MCG Oral Tab 90 tablet 3     Sig: TAKE 1 TABLET BY MOUTH EVERY DAY BEFORE BREAKFAST       Thyroid Medication Protocol Passed - 7/2/2025  8:28 PM        Passed - TSH in past 12 months        Passed - Last TSH value is normal     Lab Results   Component Value Date    TSH 2.743 04/30/2025                 Passed - In person appointment or virtual visit in the past 12 mos or appointment in next 3 mos     Recent Outpatient Visits              2 months ago Dyspnea on exertion    [facility], Eleazar Wagner MD    Office Visit    5 months ago Dyslipidemia    [facility], Eleazar Wagner MD    Office Visit    7 months ago Right lower quadrant abdominal pain    [facility], Eleazar Wagner MD    Office Visit    9 months ago Permanent atrial fibrillation (HCC)    [facility], Eleazar Wagner MD    Office Visit    9 months ago Recurrent UTI    [facility] Nayeli Deleon MD    Office Visit          Future Appointments         Provider Department Appt Notes    In 1 month Gino Lawson MD [facility] Ruptured colon and have had surgery    In 2 months Nayeli Deleon MD [facility] 1 year                    Passed - Medication is active on med list

## 2025-07-25 RX ORDER — LISINOPRIL AND HYDROCHLOROTHIAZIDE 10; 12.5 MG/1; MG/1
1 TABLET ORAL DAILY
Qty: 90 TABLET | Refills: 3 | Status: SHIPPED | OUTPATIENT
Start: 2025-07-25

## 2025-07-25 NOTE — TELEPHONE ENCOUNTER
Refill passed per [facility] protocols.    Requested Prescriptions   Pending Prescriptions Disp Refills    LISINOPRIL-HYDROCHLOROTHIAZIDE 10-12.5 MG Oral Tab [Pharmacy Med Name: LISINOPRIL-HCTZ 10-12.5 MG TAB] 90 tablet 3     Sig: TAKE 1 TABLET BY MOUTH EVERY DAY       Hypertension Medications Protocol Passed - 7/25/2025  1:34 PM

## 2025-08-11 RX ORDER — SUMATRIPTAN 50 MG/1
50 TABLET, FILM COATED ORAL EVERY 2 HOUR PRN
Qty: 9 TABLET | Refills: 5 | Status: SHIPPED | OUTPATIENT
Start: 2025-08-11

## (undated) DIAGNOSIS — M25.562 LEFT KNEE PAIN, UNSPECIFIED CHRONICITY: Primary | ICD-10-CM

## (undated) DEVICE — E-Z CLEAN, NON-STICK, PTFE COATED, ELECTROSURGICAL BLADE ELECTRODE, 2.75 INCH (7 CM): Brand: MEGADYNE

## (undated) DEVICE — BALLOON DILATATION CATHETER KIT: Brand: UROMAX ULTRA KIT

## (undated) DEVICE — LINE MNTR ADLT SET O2 INTMD

## (undated) DEVICE — MEDI-VAC NON-CONDUCTIVE SUCTION TUBING 6MM X 1.8M (6FT.) L: Brand: CARDINAL HEALTH

## (undated) DEVICE — SPONGE LAP 18X18IN WHT COT 4 PLY FLD STRUNG

## (undated) DEVICE — BINDER ABD L/XL H12XL62IN 4 PNL UNIV PREM

## (undated) DEVICE — TIGERTAIL 5F FLXTIP 70CM

## (undated) DEVICE — 6 ML SYRINGE LUER-LOCK TIP: Brand: MONOJECT

## (undated) DEVICE — UROLOGY DRAIN BAG

## (undated) DEVICE — SOLUTION IRRIG 1000ML ST H2O AQUALITE PLAS

## (undated) DEVICE — 1010 S-DRAPE TOWEL DRAPE 10/BX: Brand: STERI-DRAPE™

## (undated) DEVICE — MINOR GENERAL: Brand: MEDLINE INDUSTRIES, INC.

## (undated) DEVICE — CONTAINER SPEC STR 4OZ GRY LID

## (undated) DEVICE — SUT COAT VCRL 0 27IN CT-1 ABSRB VLT 36MM 1/2

## (undated) DEVICE — DEVICE FIX OPN ABSRB STRP SECURESTRAP

## (undated) DEVICE — UNDYED BRAIDED (POLYGLACTIN 910), SYNTHETIC ABSORBABLE SUTURE: Brand: COATED VICRYL

## (undated) DEVICE — CATH URET CONE TIP 8FR 138008

## (undated) DEVICE — PUMP SAPS 1  ACT 1 WY VLV

## (undated) DEVICE — SOL H2O 1000ML BTL

## (undated) DEVICE — GLOVE SUR 6.5 SENSICARE PI PIP GRN PWD F

## (undated) DEVICE — 3 ML SYRINGE LUER-LOCK TIP: Brand: MONOJECT

## (undated) DEVICE — SOL  .9 3000ML

## (undated) DEVICE — GAMMEX® NON-LATEX SIZE 7.5, STERILE NEOPRENE POWDER-FREE SURGICAL GLOVE: Brand: GAMMEX

## (undated) DEVICE — PAD,ABDOMINAL,8"X7.5",STERILE,LF,1/PK: Brand: MEDLINE

## (undated) DEVICE — SPONGE GZ 4X4IN COT 12 PLY TYP VII WVN C

## (undated) DEVICE — MARYLAND JAW OPEN SEALER/DIVIDER COATED 23CM: Brand: LIGASURE

## (undated) DEVICE — DUAL LUMEN URETERAL CATHETER

## (undated) DEVICE — SOL  .9 1000ML BTL

## (undated) DEVICE — CONMED DISPOSABLE BRONCHIAL CYTOLOGY BRUSH, STRAIGHT HANDLE, 3 MM X 120 CM: Brand: CONMED

## (undated) DEVICE — ANTIBACTERIAL UNDYED BRAIDED (POLYGLACTIN 910), SYNTHETIC ABSORBABLE SUTURE: Brand: COATED VICRYL

## (undated) DEVICE — SOLUTION IRRIG 1000ML 0.9% NACL USP BTL

## (undated) DEVICE — FORCEP RADIAL JAW 4

## (undated) DEVICE — ADHESIVE SKIN TOP FOR WND CLSR DERMBND ADV

## (undated) DEVICE — ENDOSCOPIC VALVE WITH ADAPTER.: Brand: SURSEAL® II

## (undated) DEVICE — Device: Brand: CUSTOM PROCEDURE KIT

## (undated) DEVICE — Device: Brand: DEFENDO AIR/WATER/SUCTION AND BIOPSY VALVE

## (undated) DEVICE — CYSTO PACK: Brand: MEDLINE INDUSTRIES, INC.

## (undated) DEVICE — ISOVUE 300 10X100ML VIAL

## (undated) DEVICE — 35 ML SYRINGE REGULAR TIP: Brand: MONOJECT

## (undated) DEVICE — MEDI-VAC NON-CONDUCTIVE SUCTION TUBING: Brand: CARDINAL HEALTH

## (undated) DEVICE — STERILE LATEX POWDER-FREE SURGICAL GLOVESWITH NITRILE COATING: Brand: PROTEXIS

## (undated) DEVICE — GLOVE SUR 6.5 SENSICARE PI MIC PIP CRM PWD F

## (undated) DEVICE — SLIM BODY SKIN STAPLER: Brand: APPOSE ULC

## (undated) DEVICE — SUT ETHBND XL 0 30IN CT-1 NABSRB GRN 36MM 1/2

## (undated) DEVICE — ZIPWIRE GUIDEWIRE .035X150 STR

## (undated) DEVICE — SOL H2O 3000ML IRRIG

## (undated) DEVICE — 3M™ IOBAN™ 2 ANTIMICROBIAL INCISE DRAPE 6650EZ: Brand: IOBAN™ 2

## (undated) DEVICE — PAD,NON-ADHERENT,3X8,STERILE,LF,1/PK: Brand: MEDLINE

## (undated) NOTE — LETTER
[employer] Cardiac Device Communication Tool    Preop to complete    [facility] ([facility]) Phone: [phone] Fax: [phone]   Patient Name Heidy Epstein   Patient  - AGE - SEX 1950 - A: 74 y - female   Surgical Date 2024   Surgical Procedure HERNIA INCISIONAL REPAIR with mesh   Surgical Location [facility]   Type of cautery anticipated: Bipolar   Surgical procedure > 2 hours      Device Clinic to Complete the Information Below, Sign and Fax to [phone]    Pacemaker or ICD    Atrial or atrial-ventricular lead?        Indication for device    Is patient pacemaker dependent?    Has pt had routine f/u and is battery life > 3 months    Is ICD programmed to inhibit therapy w/magnet?    Does device have rate response or other sensor?      Surgical Procedure above [facility] Surgical Procedure @ [facility] [place] and below   < 6 in. from ICD: Reprogram therapies OFF w/  asynchronous pacing if PM dependent  < 6 in. from PM: Reprogram asynchronous if PM   dependent ICD: No Change  PM: No Change   > 6 in. from ICD: Magnet*  > 6 in. from PM:  No Change*  * If PM dependent observe for pacing inhibition and minimize cautery if inhibition is seen                                              Bipolar cautery: No Change     Cardiac Device Management Plan (check one)     ___  Reprogram (PAT Dept to provide Rep w/ arrival date/time)   ___ Magnet    ___ No Change    Comments: ___________________________________________________________________        Signature: ________________________________ Date: ____________ Time: ______________     Print Name: ___________________________________

## (undated) NOTE — LETTER
12/10/2018              [phone] [id number]         Dear Stepan Ram records indicate that the lab tests ordered for you by Thersia Gaucher, MD  have not been done.   If you have, in fact, already completed t

## (undated) NOTE — MR AVS SNAPSHOT
[address]  [phone]               Thank you for choosing us for your health care visit with Richelle Spence MD.  We are glad to serve you and happy to provide you with this summary of your visit. Instructions:   To schedule a test at any [facility], call Central Scheduling at [phone], Monday through Friday between 7:30am to 6pm and on Saturday between 8am and 1pm. Evening and weekend appointments for your exam are a insurance company's guidelines for prior authorization for this test.  You may be held responsible for payment in full if proper authorization is not acquired.   Please contact the Patient Business Office at [phone] if you have any questions related to Take  by mouth. simvastatin 40 MG Tabs   TAKE 1 AND 1/2 TABLETS BY MOUTH EVERY EVENING   Commonly known as:  ZOCOR           SUMAtriptan Succinate 50 MG Tabs   Take 1 tablet (50 mg total) by mouth every 2 (two) hours as needed for Migraine.    Com ? Make sure carpets and area rugs have skid proof backing. ? Do not use slippery wax on bare floors. ? Keep furniture in its accustomed place. ? If you have pets, be careful that you don’t trip over them. OUTSIDE SAFETY TIPS  ?  Always wear good shoe Don’t forget strength training with weights and resistance Set goals and track your progress   You don’t need to join a gym. Home exercises work great.  Put more priority on exercise in your life                    Visit [facility] online at

## (undated) NOTE — Clinical Note
MEHREENI, TCM call made, see notes. HENRIETTA confirmed with patient that she has a HFU scheduled on 2/10/2021 at 3:40 pm, HENRIETTA sent message to MD's office to request a change in visit type to a TCM HFU.

## (undated) NOTE — LETTER
[address] [place] [address]  Authorization for Invasive Procedures  1.  I hereby authorize Dr. Khadar Walsh MD, my physician and whomever may be designated as the doctor's assistant, to perform the following operation and/or procedure 4. Should the need arise during my operation or immediate post-operative period; I also consent to the administration of blood and/or blood products.  Further, I understand that despite careful testing and screening of blood and blood products, I may still 9. Patients having a sterilization procedure: I understand that if the procedure is successful the results will be permanent and it will therefore be impossible for me to inseminate, conceive or bear children.  I also understand that the procedure is intend